# Patient Record
Sex: FEMALE | Race: OTHER | HISPANIC OR LATINO | Employment: FULL TIME | URBAN - METROPOLITAN AREA
[De-identification: names, ages, dates, MRNs, and addresses within clinical notes are randomized per-mention and may not be internally consistent; named-entity substitution may affect disease eponyms.]

---
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
## 2021-01-25 ENCOUNTER — OFFICE VISIT (OUTPATIENT)
Dept: FAMILY MEDICINE CLINIC | Facility: CLINIC | Age: 21
End: 2021-01-25

## 2021-01-25 VITALS
HEIGHT: 61 IN | BODY MASS INDEX: 27.47 KG/M2 | HEART RATE: 101 BPM | DIASTOLIC BLOOD PRESSURE: 70 MMHG | SYSTOLIC BLOOD PRESSURE: 118 MMHG | TEMPERATURE: 97 F | OXYGEN SATURATION: 97 % | RESPIRATION RATE: 18 BRPM | WEIGHT: 145.5 LBS

## 2021-01-25 DIAGNOSIS — Z72.0 TOBACCO USE: ICD-10-CM

## 2021-01-25 DIAGNOSIS — J45.20 MILD INTERMITTENT ASTHMA WITHOUT COMPLICATION: ICD-10-CM

## 2021-01-25 DIAGNOSIS — H52.10 MYOPIA, UNSPECIFIED LATERALITY: ICD-10-CM

## 2021-01-25 DIAGNOSIS — F32.A ANXIETY AND DEPRESSION: ICD-10-CM

## 2021-01-25 DIAGNOSIS — F41.9 ANXIETY AND DEPRESSION: ICD-10-CM

## 2021-01-25 DIAGNOSIS — L30.9 ECZEMA, UNSPECIFIED TYPE: ICD-10-CM

## 2021-01-25 DIAGNOSIS — E66.3 OVERWEIGHT: ICD-10-CM

## 2021-01-25 DIAGNOSIS — Z00.00 ANNUAL PHYSICAL EXAM: Primary | ICD-10-CM

## 2021-01-25 DIAGNOSIS — H52.00 HYPERMETROPIA, UNSPECIFIED LATERALITY: ICD-10-CM

## 2021-01-25 DIAGNOSIS — Z13.31 DEPRESSION SCREEN: ICD-10-CM

## 2021-01-25 PROBLEM — R06.02 SHORTNESS OF BREATH: Status: ACTIVE | Noted: 2021-01-25

## 2021-01-25 PROBLEM — R06.02 SHORTNESS OF BREATH: Status: RESOLVED | Noted: 2021-01-25 | Resolved: 2021-01-25

## 2021-01-25 PROBLEM — R63.5 WEIGHT GAIN: Status: RESOLVED | Noted: 2018-10-31 | Resolved: 2021-01-25

## 2021-01-25 PROCEDURE — 3725F SCREEN DEPRESSION PERFORMED: CPT | Performed by: PHYSICIAN ASSISTANT

## 2021-01-25 PROCEDURE — 3008F BODY MASS INDEX DOCD: CPT | Performed by: PHYSICIAN ASSISTANT

## 2021-01-25 PROCEDURE — 99395 PREV VISIT EST AGE 18-39: CPT | Performed by: PHYSICIAN ASSISTANT

## 2021-01-25 PROCEDURE — 4004F PT TOBACCO SCREEN RCVD TLK: CPT | Performed by: PHYSICIAN ASSISTANT

## 2021-01-25 RX ORDER — ALBUTEROL SULFATE 90 UG/1
1-2 AEROSOL, METERED RESPIRATORY (INHALATION) EVERY 6 HOURS PRN
Qty: 1 INHALER | Refills: 2 | Status: SHIPPED | OUTPATIENT
Start: 2021-01-25 | End: 2022-01-25 | Stop reason: SDUPTHER

## 2021-01-25 NOTE — PROGRESS NOTES
ADULT ANNUAL [address] HECTOR    NAME: Verna Esteban  AGE: 22 y o  SEX: female  : 2000     DATE: 2021     Assessment and Plan:     Problem List Items Addressed This Visit        Respiratory    Mild intermittent asthma without complication     - Stable  Continue albuterol inhaler as needed  Will refill  Relevant Medications    albuterol (PROVENTIL HFA,VENTOLIN HFA) 90 mcg/act inhaler       Musculoskeletal and Integument    Eczema     - Will trial hydrocortisone 2 5% cream, to be applied twice daily as needed to the affected areas  - Advised patient to continue applying OTC lotion such as aveeno or eucerin twice daily and also immediately after showers  - Advised to take fast showers with warm water (not hot)  Advised to pat skin dry rather than rub  Advised to use [place], [employer], or BASIS soap products or anything without scents  Relevant Medications    hydrocortisone 2 5 % cream       Other    Anxiety and depression     - Offered patient referral to psychiatry but patient declines at this time  Advised to call the office if she changes her mind  Hypermetropia    Relevant Orders    Ambulatory referral to Optometry      Other Visit Diagnoses     Annual physical exam    -  Primary    Depression screen        Myopia, unspecified laterality        Relevant Orders    Ambulatory referral to Optometry    Overweight        Tobacco use            Depression Screening Follow-up Plan: Patient's depression screening was positive with a PHQ-2 score of 2  Their PHQ-9 score was 10  Patient assessed for underlying major depression  They have no active suicidal ideations  Brief counseling provided and recommend additional follow-up/re-evaluation next office visit  Immunizations and preventive care screenings were discussed with patient today   Appropriate education was printed on patient's after visit summary  Counseling:  Alcohol/drug use: discussed moderation in alcohol intake, the recommendations for healthy alcohol use, and avoidance of illicit drug use  Dental Health: discussed importance of regular tooth brushing, flossing, and dental visits  Injury prevention: discussed safety/seat belts, safety helmets, smoke detectors, carbon dioxide detectors, and smoking near bedding or upholstery  Sexual health: discussed sexually transmitted diseases, partner selection, use of condoms, avoidance of unintended pregnancy, and contraceptive alternatives  · Exercise: the importance of regular exercise/physical activity was discussed  Recommend exercise 3-5 times per week for at least 30 minutes  BMI Counseling: Body mass index is 27 49 kg/m²  The BMI is above normal  Nutrition recommendations include decreasing portion sizes, encouraging healthy choices of fruits and vegetables, consuming healthier snacks and moderation in carbohydrate intake  Exercise recommendations include moderate physical activity 150 minutes/week  No pharmacotherapy was ordered  Tobacco Cessation Counseling: Tobacco cessation counseling was provided  The patient is sincerely urged to quit consumption of tobacco  She is not ready to quit tobacco  Medication options and side effects of medication discussed  Patient refused medication  Return in about 1 year (around 1/25/2022), or if symptoms worsen or fail to improve, for Annual physical      Chief Complaint:     Chief Complaint   Patient presents with    Well Check     well ck 23 y/o, as per pt she has dry skin and would like to be referred to a eye speacilistl    Medication Refill     as per pt needs inhaler refilled      History of Present Illness:     Adult Annual Physical   Patient here for a comprehensive physical exam  Patient notes she has always suffered from dry skin which is worse in the winter and in the summer    Patient notes it is located throughout her body, but is worse on her arms and legs  Patient notes it is often itchy  Patient denies any pain or burning sensation  Patient notes she has tried some type of cream, but did not help  Patient does not know the name of the cream   Patient notes she has been using baby lotion for moisturizer daily  Diet and Physical Activity  · Diet/Nutrition: Doesn't eat much because she notes she doesnt have much of an appetite  · Exercise: no formal exercise  Depression Screening  PHQ-9 Depression Screening    PHQ-9:   Frequency of the following problems over the past two weeks:      Little interest or pleasure in doing things: 0 - not at all  Feeling down, depressed, or hopeless: 2 - more than half the days  Trouble falling or staying asleep, or sleeping too much: 3 - nearly every day  Feeling tired or having little energy: 0 - not at all  Poor appetite or overeating: 3 - nearly every day  Feeling bad about yourself - or that you are a failure or have let yourself or your family down: 2 - more than half the days  Trouble concentrating on things, such as reading the newspaper or watching television: 0 - not at all  Moving or speaking so slowly that other people could have noticed  Or the opposite - being so fidgety or restless that you have been moving around a lot more than usual: 0 - not at all  Thoughts that you would be better off dead, or of hurting yourself in some way: 0 - not at all  PHQ-2 Score: 2  PHQ-9 Score: 10       General Health  · Sleep: sleeps poorly  · Hearing: normal - bilateral   · Vision: vision problems: has issues with both near and far vision  Patient was previously wearing glasses that she received in [place] but they broke when she came to the [facility]    Patient would like to establish care with an eye doctor  · Dental: no dental visits for >1 year  /GYN Health  · Last menstrual period: 1/18/2021     Review of Systems:     Review of Systems   Constitutional: Positive for fatigue  Negative for chills and fever  HENT: Negative for congestion, ear pain, rhinorrhea and sore throat  Eyes: Positive for visual disturbance  Negative for pain  Respiratory: Negative for cough, chest tightness and shortness of breath  Cardiovascular: Negative for chest pain and palpitations  Gastrointestinal: Negative for abdominal pain, constipation, diarrhea, nausea and vomiting  Genitourinary: Negative for difficulty urinating and dysuria  Musculoskeletal: Negative for arthralgias  Skin: Positive for rash  Neurological: Negative for dizziness and headaches  Psychiatric/Behavioral: Positive for sleep disturbance  Negative for suicidal ideas  The patient is not nervous/anxious         Past Medical History:     Past Medical History:   Diagnosis Date    Asthma     Endometriosis       Past Surgical History:     Past Surgical History:   Procedure Laterality Date    NO PAST SURGERIES        Social History:     Social History     Socioeconomic History    Marital status: Single     Spouse name: None    Number of children: 0    Years of education: None    Highest education level: None   Occupational History    None   Social Needs    Financial resource strain: None    Food insecurity     Worry: None     Inability: None    Transportation needs     Medical: None     Non-medical: None   Tobacco Use    Smoking status: Current Some Day Smoker    Smokeless tobacco: Never Used    Tobacco comment: hookah soically    Substance and Sexual Activity    Alcohol use: No    Drug use: No    Sexual activity: Never   Lifestyle    Physical activity     Days per week: None     Minutes per session: None    Stress: None   Relationships    Social connections     Talks on phone: None     Gets together: None     Attends Restoration service: None     Active member of club or organization: None     Attends meetings of clubs or organizations: None     Relationship status: None    Intimate partner violence     Fear of current or ex partner: None     Emotionally abused: None     Physically abused: None     Forced sexual activity: None   Other Topics Concern    None   Social History Narrative    None      Family History:     Family History   Problem Relation Age of Onset    Anxiety disorder Mother     Depression Mother     Asthma Mother     Asthma Brother       Current Medications:     Current Outpatient Medications   Medication Sig Dispense Refill    albuterol (PROVENTIL HFA,VENTOLIN HFA) 90 mcg/act inhaler Inhale 1-2 puffs every 6 (six) hours as needed for wheezing or shortness of breath 1 Inhaler 2    hydrocortisone 2 5 % cream Apply topically 2 (two) times a day as needed for rash 30 g 1     No current facility-administered medications for this visit  Allergies:     No Known Allergies   Physical Exam:     /70 (BP Location: Right arm, Patient Position: Sitting, Cuff Size: Standard)   Pulse 101   Temp (!) 97 °F (36 1 °C) (Temporal)   Resp 18   Ht 5' 1" (1 549 m)   Wt 66 kg (145 lb 8 oz)   LMP 01/18/2021   SpO2 97%   BMI 27 49 kg/m²     Physical Exam  Vitals signs and nursing note reviewed  Constitutional:       Appearance: She is well-developed  HENT:      Head: Normocephalic and atraumatic  Right Ear: Tympanic membrane and external ear normal       Left Ear: Tympanic membrane and external ear normal       Nose: Nose normal       Mouth/Throat:      Pharynx: Uvula midline  Eyes:      Extraocular Movements: Extraocular movements intact  Conjunctiva/sclera: Conjunctivae normal       Pupils: Pupils are equal, round, and reactive to light  Neck:      Musculoskeletal: Normal range of motion and neck supple  Cardiovascular:      Rate and Rhythm: Normal rate and regular rhythm  Heart sounds: Normal heart sounds  No murmur  Pulmonary:      Effort: Pulmonary effort is normal       Breath sounds: Normal breath sounds  No wheezing     Abdominal:      General: Bowel sounds are normal  Palpations: Abdomen is soft  Tenderness: There is no abdominal tenderness  Musculoskeletal: Normal range of motion  General: No swelling  Skin:     General: Skin is warm and dry  Neurological:      Mental Status: She is alert and oriented to person, place, and time  Psychiatric:         Mood and Affect: Mood normal          Speech: Speech normal          Behavior: Behavior normal           - Utilized [employer] phones for translation        MARSHAL Bullard Sa

## 2021-01-25 NOTE — ASSESSMENT & PLAN NOTE
- Will trial hydrocortisone 2 5% cream, to be applied twice daily as needed to the affected areas  - Advised patient to continue applying OTC lotion such as aveeno or eucerin twice daily and also immediately after showers  - Advised to take fast showers with warm water (not hot)  Advised to pat skin dry rather than rub  Advised to use [facility], [facility], or BASIS soap products or anything without scents

## 2021-01-25 NOTE — PATIENT INSTRUCTIONS
Eczema   LO QUE NECESITA SABER:   El eczema o dermatitis atópica, es un sarpullido locke en la piel que causa comezón  Esta es mckayla condición de larga duración que podría provocar brotes por el ronit de strauss pavan  INSTRUCCIONES SOBRE EL DAWIT HOSPITALARIA:   Regrese a la michael de emergencias si:  · Usted tiene fiebre o nota cely rojizas subiendo por strauss Maki Rockers o pierna  · El sarpullido se ve más inflamado, locke o caliente  Comuníquese con strauss médico si:  · La mayor parte de strauss piel está [place], inflamada, adolorida y [place] con escamas  · Usted desarrolla costras husain, sangrientas y dolorosas  · Strauss piel se ampolla y expulsa pus becker o amarillo  · Tiene alguna pregunta acerca de strauss condición o cuidado  Medicamentos:  · Los medicamentos , marium los inmunosupresores, ayudan a aliviar la comezón, el enrojecimiento, el dolor y la inflamación  Se pueden administrar en forma de cremas o pastillas  Es probable que usted también reciba antihistamínicos para reducir la comezón o antibióticos si tiene mckayla infección en la piel  · [place] lisa medicamentos marium se le haya indicado  Consulte con strauss médico si usted aleja que strauss medicamento no le está ayudando o si presenta efectos secundarios  Infórmele si es alérgico a algún medicamento  Mantenga mckayla lista actualizada de los [place], las vitaminas y los productos herbales que meir  Incluya los siguientes datos de los medicamentos: cantidad, frecuencia y motivo de administración  Traiga con usted la lista o los envases de las píldoras a lisa citas de seguimiento  Lleve la lista de los medicamentos con usted en anurag de mckayla emergencia  Controle el eczema:  · No se rasque  Dése palmaditas o presione la piel para aliviar la comezón  Los síntomas empeorarán si usted se rasca  Mantenga lisa uñas cortas para que no se desgarre la piel si se rasca  · Mantenga strauss piel húmeda   Aplique mckayla loción, crema o pomada en strauss piel después de un baño o mckayla ducha cuando la piel todavía está húmeda  Pregunte a romano médico que usar y con qué frecuencia  · [place] mckayla ducha o báñese con agua tibia jeffrey 10 minutos o menos  Use jabón suave  Pregúntele a romano médico cuál jabón es adecuado para usted  · Use ropa de algodón  Use ropa holgada de algodón o mezclas de algodón  Evite la ropa de samy  · Use un humidificador para añadir humedad en el aire de romano hogar  · Evite cambios de temperatura , especialmente actividades que le producen sudoración excesiva porque esto le puede ocasionar comezón  Retire las [employer] de romano cama si usted se acalora mientras duerme  · Evite los alérgenos, polvos e irritantes de la piel  Las mascotas no deben ser permitidas dentro de romano casa  No use perfume, suavizante de ropa o maquillaje que le produzca mckayla sensación de ardor o comezón  Acuda a lisa consultas de control con romano médico según le indicaron  Anote lisa preguntas para que se acuerde de hacerlas jeffrey lisa visitas  © Copyright [employer] Information is for End User's use only and may not be sold, redistributed or otherwise used for commercial purposes  All illustrations and images included in CareNotes® are the copyrighted property of A D A M , [employer]  or [address] [place] [address] es sólo para uso en educación  Romano intención no es darle un consejo médico sobre enfermedades o tratamientos  Colsulte con romano Marco Antonio Hu farmacéutico antes de seguir cualquier régimen médico para saber si es seguro y efectivo para usted

## 2021-01-25 NOTE — ASSESSMENT & PLAN NOTE
- Offered patient referral to psychiatry but patient declines at this time  Advised to call the office if she changes her mind

## 2021-12-05 ENCOUNTER — HOSPITAL ENCOUNTER (EMERGENCY)
Facility: HOSPITAL | Age: 21
Discharge: HOME | End: 2021-12-05
Attending: EMERGENCY MEDICINE | Admitting: EMERGENCY MEDICINE
Payer: COMMERCIAL

## 2021-12-05 VITALS
DIASTOLIC BLOOD PRESSURE: 68 MMHG | WEIGHT: 154.32 LBS | SYSTOLIC BLOOD PRESSURE: 150 MMHG | BODY MASS INDEX: 29.16 KG/M2 | TEMPERATURE: 100 F | HEART RATE: 108 BPM | OXYGEN SATURATION: 98 % | RESPIRATION RATE: 16 BRPM

## 2021-12-05 DIAGNOSIS — J11.1 INFLUENZA: Primary | ICD-10-CM

## 2021-12-05 LAB
ALBUMIN SERPL BCP-MCNC: 4.2 G/DL (ref 3–5.2)
ALP SERPL-CCNC: 76 U/L (ref 43–122)
ALT SERPL W P-5'-P-CCNC: 17 U/L
ANION GAP SERPL CALCULATED.3IONS-SCNC: 5 MMOL/L (ref 5–14)
AST SERPL W P-5'-P-CCNC: 28 U/L (ref 14–36)
BILIRUB SERPL-MCNC: 0.72 MG/DL
BUN SERPL-MCNC: 7 MG/DL (ref 5–25)
CALCIUM SERPL-MCNC: 8.9 MG/DL (ref 8.4–10.2)
CHLORIDE SERPL-SCNC: 101 MMOL/L (ref 97–108)
CO2 SERPL-SCNC: 30 MMOL/L (ref 22–30)
CREAT SERPL-MCNC: 0.7 MG/DL (ref 0.6–1.2)
ERYTHROCYTE [DISTWIDTH] IN BLOOD BY AUTOMATED COUNT: 13.1 %
FLUAV RNA RESP QL NAA+PROBE: POSITIVE
FLUBV RNA RESP QL NAA+PROBE: NEGATIVE
GFR SERPL CREATININE-BSD FRML MDRD: 125 ML/MIN/1.73SQ M
GLUCOSE SERPL-MCNC: 88 MG/DL (ref 70–99)
HCT VFR BLD AUTO: 42.2 % (ref 36–46)
HGB BLD-MCNC: 14.5 G/DL (ref 12–16)
LIPASE SERPL-CCNC: 74 U/L (ref 23–300)
LYMPHOCYTES # BLD AUTO: 0.57 THOUSAND/UL (ref 0.5–4)
LYMPHOCYTES # BLD AUTO: 11 % (ref 25–45)
MCH RBC QN AUTO: 31.7 PG (ref 26–34)
MCHC RBC AUTO-ENTMCNC: 34.2 G/DL (ref 31–36)
MCV RBC AUTO: 93 FL (ref 80–100)
MONOCYTES # BLD AUTO: 0.47 THOUSAND/UL (ref 0.2–0.9)
MONOCYTES NFR BLD AUTO: 9 % (ref 1–10)
NEUTS BAND NFR BLD MANUAL: 5 % (ref 0–8)
NEUTS SEG # BLD: 4.16 THOUSAND/UL (ref 1.8–7.8)
NEUTS SEG NFR BLD AUTO: 75 %
PLATELET # BLD AUTO: 169 THOUSANDS/UL (ref 150–450)
PLATELET BLD QL SMEAR: ADEQUATE
PMV BLD AUTO: 9.5 FL (ref 8.9–12.7)
POTASSIUM SERPL-SCNC: 4 MMOL/L (ref 3.6–5)
PROT SERPL-MCNC: 8 G/DL (ref 5.9–8.4)
RBC # BLD AUTO: 4.56 MILLION/UL (ref 4–5.2)
RBC MORPH BLD: NORMAL
RSV RNA RESP QL NAA+PROBE: NEGATIVE
S PYO DNA THROAT QL NAA+PROBE: NORMAL
SARS-COV-2 RNA RESP QL NAA+PROBE: NEGATIVE
SODIUM SERPL-SCNC: 136 MMOL/L (ref 137–147)
TOTAL CELLS COUNTED SPEC: 100
WBC # BLD AUTO: 5.2 THOUSAND/UL (ref 4.5–11)

## 2021-12-05 PROCEDURE — 85027 COMPLETE CBC AUTOMATED: CPT

## 2021-12-05 PROCEDURE — 80053 COMPREHEN METABOLIC PANEL: CPT

## 2021-12-05 PROCEDURE — 36415 COLL VENOUS BLD VENIPUNCTURE: CPT

## 2021-12-05 PROCEDURE — 99284 EMERGENCY DEPT VISIT MOD MDM: CPT

## 2021-12-05 PROCEDURE — 96361 HYDRATE IV INFUSION ADD-ON: CPT

## 2021-12-05 PROCEDURE — 83690 ASSAY OF LIPASE: CPT

## 2021-12-05 PROCEDURE — 85007 BL SMEAR W/DIFF WBC COUNT: CPT

## 2021-12-05 PROCEDURE — 87651 STREP A DNA AMP PROBE: CPT

## 2021-12-05 PROCEDURE — 0241U HB NFCT DS VIR RESP RNA 4 TRGT: CPT

## 2021-12-05 PROCEDURE — 96374 THER/PROPH/DIAG INJ IV PUSH: CPT

## 2021-12-05 RX ORDER — ONDANSETRON 2 MG/ML
4 INJECTION INTRAMUSCULAR; INTRAVENOUS ONCE
Status: COMPLETED | OUTPATIENT
Start: 2021-12-05 | End: 2021-12-05

## 2021-12-05 RX ORDER — IBUPROFEN 400 MG/1
400 TABLET ORAL ONCE
Status: COMPLETED | OUTPATIENT
Start: 2021-12-05 | End: 2021-12-05

## 2021-12-05 RX ADMIN — ONDANSETRON 4 MG: 2 INJECTION INTRAMUSCULAR; INTRAVENOUS at 13:11

## 2021-12-05 RX ADMIN — IBUPROFEN 400 MG: 400 TABLET ORAL at 13:11

## 2021-12-05 RX ADMIN — SODIUM CHLORIDE 1000 ML: 0.9 INJECTION, SOLUTION INTRAVENOUS at 13:11

## 2022-01-06 ENCOUNTER — OFFICE VISIT (OUTPATIENT)
Dept: FAMILY MEDICINE CLINIC | Facility: CLINIC | Age: 22
End: 2022-01-06

## 2022-01-06 VITALS
RESPIRATION RATE: 16 BRPM | HEART RATE: 93 BPM | TEMPERATURE: 98 F | DIASTOLIC BLOOD PRESSURE: 80 MMHG | HEIGHT: 61 IN | OXYGEN SATURATION: 99 % | WEIGHT: 163.9 LBS | SYSTOLIC BLOOD PRESSURE: 110 MMHG | BODY MASS INDEX: 30.94 KG/M2

## 2022-01-06 DIAGNOSIS — H52.10 MYOPIA, UNSPECIFIED LATERALITY: ICD-10-CM

## 2022-01-06 DIAGNOSIS — H52.00 HYPERMETROPIA, UNSPECIFIED LATERALITY: ICD-10-CM

## 2022-01-06 DIAGNOSIS — R22.0 SWELLING, MASS, OR LUMP ON FACE: Primary | ICD-10-CM

## 2022-01-06 DIAGNOSIS — E66.09 CLASS 1 OBESITY DUE TO EXCESS CALORIES WITHOUT SERIOUS COMORBIDITY WITH BODY MASS INDEX (BMI) OF 30.0 TO 30.9 IN ADULT: ICD-10-CM

## 2022-01-06 PROCEDURE — 99213 OFFICE O/P EST LOW 20 MIN: CPT | Performed by: PHYSICIAN ASSISTANT

## 2022-01-06 NOTE — PROGRESS NOTES
Assessment/Plan:    Swelling, mass, or lump on face  - Most likely epidermoid cyst   Since lump is tender to palpation and bothersome for patient, she would like for it to be removed  Will refer to Plastic surgery for further evaluation and management  Diagnoses and all orders for this visit:    Swelling, mass, or lump on face  -     Ambulatory referral to Plastic Surgery; Future    Myopia, unspecified laterality  -     Ambulatory referral to Ophthalmology; Future    Hypermetropia, unspecified laterality  -     Ambulatory referral to Ophthalmology; Future    Class 1 obesity due to excess calories without serious comorbidity with body mass index (BMI) of 30 0 to 30 9 in adult          All of patients questions were answered  Patient understands and agrees with the above plan  Return if symptoms worsen or fail to improve  Nika Abraham PA-C  01/06/22  [facility] CHARLES Sia          Subjective:     Patient ID: Osmar Chahal  is a 25 y o  female with known PHM of anxiety who presents today in office for lump on head  - Patient is a 25 y o  female who presents today for lump on head  Patient notes she first noticed a lump located on the right mid forehead about 3 months ago  Patient notes it has been growing larger and is now bothersome  Patient denies any injury or trauma to the area  Patient denies any discharge  Also, patient notes she would like to establish with an eye doctor because she has trouble with her near and far vision  The following portions of the patient's history were reviewed and updated as appropriate: allergies, current medications, past family history, past medical history, past social history, past surgical history and problem list         Review of Systems   Constitutional: Negative for appetite change, chills and fever  HENT: Negative for congestion and sore throat  Eyes: Negative for pain  Respiratory: Negative for cough, chest tightness and shortness of breath  Cardiovascular: Negative for chest pain and palpitations  Gastrointestinal: Negative for abdominal pain, constipation, diarrhea, nausea and vomiting  Genitourinary: Negative for difficulty urinating and dysuria  Musculoskeletal: Negative for myalgias  Skin: Negative for rash  Lump of forehead   Neurological: Negative for dizziness and headaches  BMI Counseling: Body mass index is 30 97 kg/m²  The BMI is above normal  Nutrition recommendations include encouraging healthy choices of fruits and vegetables  Exercise recommendations include moderate physical activity 150 minutes/week  No pharmacotherapy was ordered  Rationale for BMI follow-up plan is due to patient being overweight or obese  Objective:   Vitals:    01/06/22 0903   BP: 110/80   BP Location: Right arm   Patient Position: Sitting   Cuff Size: Standard   Pulse: 93   Resp: 16   Temp: 98 °F (36 7 °C)   TempSrc: Temporal   SpO2: 99%   Weight: 74 3 kg (163 lb 14 4 oz)   Height: 5' 1" (1 549 m)         Physical Exam  Vitals and nursing note reviewed  Constitutional:       General: She is not in acute distress  Appearance: She is well-developed  HENT:      Head: Normocephalic and atraumatic  Right Ear: External ear normal       Left Ear: External ear normal       Nose: Nose normal    Eyes:      Conjunctiva/sclera: Conjunctivae normal    Cardiovascular:      Rate and Rhythm: Normal rate and regular rhythm  Pulses: Normal pulses  Heart sounds: Normal heart sounds  Pulmonary:      Effort: Pulmonary effort is normal  No respiratory distress  Breath sounds: Normal breath sounds  No wheezing  Musculoskeletal:      Cervical back: Normal range of motion and neck supple  Skin:     General: Skin is warm and dry  Neurological:      Mental Status: She is alert and oriented to person, place, and time     Psychiatric:         Behavior: Behavior normal            - Utilized [facility] services for translation

## 2022-01-06 NOTE — ASSESSMENT & PLAN NOTE
- Most likely epidermoid cyst   Since lump is tender to palpation and bothersome for patient, she would like for it to be removed  Will refer to Plastic surgery for further evaluation and management

## 2022-01-25 ENCOUNTER — OFFICE VISIT (OUTPATIENT)
Dept: FAMILY MEDICINE CLINIC | Facility: CLINIC | Age: 22
End: 2022-01-25

## 2022-01-25 VITALS
HEART RATE: 112 BPM | BODY MASS INDEX: 30.58 KG/M2 | RESPIRATION RATE: 18 BRPM | DIASTOLIC BLOOD PRESSURE: 74 MMHG | HEIGHT: 61 IN | TEMPERATURE: 98.2 F | OXYGEN SATURATION: 98 % | SYSTOLIC BLOOD PRESSURE: 110 MMHG | WEIGHT: 162 LBS

## 2022-01-25 DIAGNOSIS — J45.20 MILD INTERMITTENT ASTHMA WITHOUT COMPLICATION: ICD-10-CM

## 2022-01-25 DIAGNOSIS — Z00.00 ANNUAL PHYSICAL EXAM: Primary | ICD-10-CM

## 2022-01-25 DIAGNOSIS — L30.9 ECZEMA, UNSPECIFIED TYPE: ICD-10-CM

## 2022-01-25 PROCEDURE — 99395 PREV VISIT EST AGE 18-39: CPT | Performed by: PHYSICIAN ASSISTANT

## 2022-01-25 RX ORDER — ALBUTEROL SULFATE 2.5 MG/3ML
2.5 SOLUTION RESPIRATORY (INHALATION) EVERY 6 HOURS PRN
Qty: 60 ML | Refills: 1 | Status: SHIPPED | OUTPATIENT
Start: 2022-01-25

## 2022-01-25 RX ORDER — ALBUTEROL SULFATE 90 UG/1
1-2 AEROSOL, METERED RESPIRATORY (INHALATION) EVERY 6 HOURS PRN
Qty: 8 G | Refills: 2 | Status: SHIPPED | OUTPATIENT
Start: 2022-01-25

## 2022-01-25 RX ORDER — TRIAMCINOLONE ACETONIDE 1 MG/G
CREAM TOPICAL 2 TIMES DAILY
Qty: 80 G | Refills: 1 | Status: SHIPPED | OUTPATIENT
Start: 2022-01-25

## 2022-01-25 NOTE — PROGRESS NOTES
[address] [facility] PRACTICE HECTOR    NAME: Lonnie Esteban  AGE: 25 y o  SEX: female  : 2000     DATE: 2022     Assessment and Plan:     Problem List Items Addressed This Visit        Respiratory    Mild intermittent asthma without complication     - Stable  Continue albuterol inhaler as needed  Will refill    - Will refill nebulizer solution  Relevant Medications    albuterol (2 5 mg/3 mL) 0 083 % nebulizer solution    albuterol (PROVENTIL HFA,VENTOLIN HFA) 90 mcg/act inhaler       Musculoskeletal and Integument    Eczema     - Hydrocortisone 2 5% cream was not effective  Will prescribe triamcinolone 0 1% cream, apply twice daily to affected area  - Advised patient to continue applying OTC lotion such as aveeno or eucerin twice daily and also immediately after showers  - Advised to take fast showers with warm water (not hot)  Advised to pat skin dry rather than rub  Advised to use [place], [facility], or [facility] soap products or anything without scents  Relevant Medications    triamcinolone (KENALOG) 0 1 % cream      Other Visit Diagnoses     Annual physical exam    -  Primary          Immunizations and preventive care screenings were discussed with patient today  Appropriate education was printed on patient's after visit summary  Counseling:  Alcohol/drug use: discussed moderation in alcohol intake, the recommendations for healthy alcohol use, and avoidance of illicit drug use  Dental Health: discussed importance of regular tooth brushing, flossing, and dental visits  Injury prevention: discussed safety/seat belts, safety helmets, smoke detectors, carbon dioxide detectors, and smoking near bedding or upholstery  Sexual health: discussed sexually transmitted diseases, partner selection, use of condoms, avoidance of unintended pregnancy, and contraceptive alternatives    · Exercise: the importance of regular exercise/physical activity was discussed  Recommend exercise 3-5 times per week for at least 30 minutes  Return in about 1 year (around 1/25/2023) for Annual physical      Chief Complaint:     Chief Complaint   Patient presents with    Physical Exam      History of Present Illness:     Adult Annual Physical   Patient here for a comprehensive physical exam   Patient notes she has eczema on her arms and legs  Patient notes it is usually worse during the cold weather months  Patient did try hydrocortisone cream but has not help very much  Diet and Physical Activity  · Diet/Nutrition: average, picky eater  · Exercise: no formal exercise  General Health  · Sleep: sleeps poorly  · Hearing: normal - bilateral   · Vision: goes for regular eye exams and wears glasses  · Dental: regular dental visits  /GYN Health  · Last menstrual period: 1/1/2022; menses are irregular  · Last PAP: Per patient, earlier this month through Dr Erica Jones  Review of Systems:     Review of Systems   Constitutional: Negative for appetite change, chills and fever  HENT: Negative for congestion and sore throat  Eyes: Negative for pain  Respiratory: Negative for cough, chest tightness and shortness of breath  Cardiovascular: Negative for chest pain and palpitations  Gastrointestinal: Negative for abdominal pain, constipation, diarrhea, nausea and vomiting  Genitourinary: Negative for difficulty urinating and dysuria  Musculoskeletal: Negative for myalgias  Skin: Positive for rash  Neurological: Negative for dizziness and headaches        Past Medical History:     Past Medical History:   Diagnosis Date    Asthma     Endometriosis       Past Surgical History:     Past Surgical History:   Procedure Laterality Date    NO PAST SURGERIES        Social History:     Social History     Socioeconomic History    Marital status: Single     Spouse name: None    Number of children: 0    Years of education: None    Highest education level: None   Occupational History    None   Tobacco Use    Smoking status: Current Some Day Smoker    Smokeless tobacco: Never Used    Tobacco comment: hookah soically    Substance and Sexual Activity    Alcohol use: No    Drug use: No    Sexual activity: Never   Other Topics Concern    None   Social History Narrative    None     Social Determinants of Health     Financial Resource Strain: Low Risk     Difficulty of Paying Living Expenses: Not very hard   Food Insecurity: No Food Insecurity    Worried About Running Out of Food in the Last Year: Never true    Mariano of Food in the Last Year: Never true   Transportation Needs: No Transportation Needs    Lack of Transportation (Medical): No    Lack of Transportation (Non-Medical): No   Physical Activity: Not on file   Stress: Not on file   Social Connections: Not on file   Intimate Partner Violence: Not on file   Housing Stability: Not on file      Family History:     Family History   Problem Relation Age of Onset    Anxiety disorder Mother     Depression Mother     Asthma Mother     Asthma Brother       Current Medications:     Current Outpatient Medications   Medication Sig Dispense Refill    albuterol (2 5 mg/3 mL) 0 083 % nebulizer solution Take 3 mL (2 5 mg total) by nebulization every 6 (six) hours as needed for wheezing or shortness of breath 60 mL 1    albuterol (PROVENTIL HFA,VENTOLIN HFA) 90 mcg/act inhaler Inhale 1-2 puffs every 6 (six) hours as needed for wheezing or shortness of breath 8 g 2    triamcinolone (KENALOG) 0 1 % cream Apply topically 2 (two) times a day 80 g 1     No current facility-administered medications for this visit        Allergies:     No Known Allergies   Physical Exam:     /74 (BP Location: Left arm, Patient Position: Sitting, Cuff Size: Standard)   Pulse (!) 112   Temp 98 2 °F (36 8 °C) (Temporal)   Resp 18   Ht 5' 1" (1 549 m)   Wt 73 5 kg (162 lb)   LMP 01/01/2022   SpO2 98%   BMI 30 61 kg/m²     Physical Exam  Vitals and nursing note reviewed  Constitutional:       General: She is not in acute distress  Appearance: She is well-developed  HENT:      Head: Normocephalic and atraumatic  Right Ear: Tympanic membrane and external ear normal       Left Ear: Tympanic membrane and external ear normal       Nose: Nose normal       Mouth/Throat:      Pharynx: Uvula midline  Eyes:      Extraocular Movements: Extraocular movements intact  Conjunctiva/sclera: Conjunctivae normal       Pupils: Pupils are equal, round, and reactive to light  Cardiovascular:      Rate and Rhythm: Normal rate and regular rhythm  Pulses: Normal pulses  Heart sounds: Normal heart sounds  Pulmonary:      Effort: Pulmonary effort is normal  No respiratory distress  Breath sounds: Normal breath sounds  No wheezing  Abdominal:      General: Bowel sounds are normal       Palpations: Abdomen is soft  Tenderness: There is no abdominal tenderness  Musculoskeletal:         General: No swelling  Normal range of motion  Cervical back: Normal range of motion and neck supple  Skin:     General: Skin is warm and dry  Neurological:      Mental Status: She is alert and oriented to person, place, and time  Psychiatric:         Mood and Affect: Mood normal          Speech: Speech normal          Behavior: Behavior normal           - Utilized [employer] for translation      MARSHAL Wallace 71

## 2022-01-25 NOTE — PATIENT INSTRUCTIONS
[facility] Reconstructive Surgery · Phone  [address] [facility], [facility], [address] [facility]  [phone]    Visita de bienestar para los adultos   CUIDADO [place]:   [facility] visita de bienestar es cuando usted acude con un médico para que le kelsie exámenes de detección de problemas de [place]  Romano médico también le dará consejos sobre cómo mantenerse saludable  Anote lisa preguntas para que se acuerde de hacerlas  Pregunte a romano médico con qué frecuencia debería realizarse mckayla visita de bienestar  Lo que sucede en mckayla visita de bienestar: Romano médico le preguntará sobre romano juliano y romano historia familiar [address] [facility]  [place] incluye presión arterial octavio, enfermedades del corazón y cáncer  El médico le preguntará si tiene síntomas que le preocupen, si [facility] y [place] de ánimo  También se le preguntará acerca del uso de medicamentos, suplementos, alimentos y alcohol  Es posible que le kelsie cualquiera de lo siguiente:  · Romano peso será revisado  Es posible que [facility] midan romano altura para calcular romano índice de masa corporal LTAC, located within [facility])  El [facility] indica si tiene un peso saludable  · Se verificarán romano presión arterial y frecuencia cardíaca  También pueden revisar romano temperatura  · Exámenes de [facility] y [facility] podría realizarse  Se podrían realizar exámenes de lukasz para revisar los niveles de [facility]  Los niveles anormales de colesterol aumentan el riesgo de enfermedad del corazón y accidente cerebrovascular  Puede que también necesite mckayla prueba de lukasz u orina para revisar si tiene diabetes si usted está en mayor riesgo  Las pruebas de orina pueden hacerse para buscar signos de mckayla infección o mckayla enfermedad renal     · Un examen físico incluye la comprobación de lisa latidos del corazón y los pulmones con un estetoscopio  Romano médico también podría revisarle la piel para buscar daños causados por el sol  · Pruebas de detección podría recomendarse   Se realiza un examen de detección para detectar enfermedades que pueden no causar síntomas  Los exámenes de detección que necesite dependen de romano edad, género, antecedentes familiares y hábitos de pavan  Por ejemplo, podrían recomendarle la exploración selectiva colorrectal si tiene 48 años o más  Si es [place], necesita los siguientes exámenes de detección:  · El examen de Papanicolaou se utiliza para detectar cáncer de chris uterino  El examen del Papanicolaou por lo general se realiza entre 3 a 5 años dependiendo de romano edad  Puede necesitarlo más a menudo si usted ha tenido [employer] de la prueba de Papanicolaou en el pasado  Pregunte a romano médico con qué frecuencia debería realizarse un examen de Papanicolaou  · Jodi Manish es mckayla radiografía de lisa mamas para detectar cáncer de mama  Los expertos recomiendan [address] cada 2 años empezando a los 48 años de Anton  Es probable que usted necesite Jodi [place] a los 52 años o antes si tiene riesgo alto de cáncer de seno  Hable con romano médico sobre cuándo debe empezar con lisa mamografías y con cuánta frecuencia las necesita  Vacunas que podría necesitar:  · Debe recibir mckayla vacuna contra la influenza todos los Los arnoldo  La vacuna contra la influenza protege de la gripe  Varios tipos de virus causan la influenza  Debido a que los virus [place] con el [place], es necesaria la producción de nuevas vacunas cada año  · Debe recibir Myesha [place] vacuna de refuerzo contra el tétanos-difteria (Td) cada 10 años  Esta vacuna protege contra el tétanos y la difteria  El tétanos es mckayla infección severa que puede causar trismo y espasmos musculares dolorosos  La difteria es mckayla infección bacteriana grave que produce mckayla cubierta gruesa en la parte de atrás de la boca y garganta  · Debe recibir la vacuna contra el virus del papiloma humano (VPH) si usted es keyonna y [place] 19 y 32 años o es hombre y [place] 23 y 24 años y nunca la recibió  Esta vacuna protege contra la infección por VPH   El virus del papiloma Pepin o VPH es la infección más común que se transmite por contacto sexual  El virus del papiloma humano también podría provocar cáncer vaginal, del pene y del ano  · Debe recibir la vacuna antineumocócica si tiene más de 72 años  La vacuna antineumocócica es mckayla inyección que se administra para protegerlo contra mckayla enfermedad neumocócica  La enfermedad neumocócica es mckayla infección causada por la bacteria neumocócica  La infección puede causar neumonía, meningitis o mckayla infección del oído  · Debe recibir la vacuna contra la culebrilla Si tiene 61 años o más, incluso si ha tenido culebrilla antes  La vacuna contra la culebrilla (herpes zóster) es mckayla inyección usada para proteger contra el virus zóster que causa la varicela  [place] es el mismo virus que causa la varicela  La culebrilla es un sarpullido doloroso que se desarrolla en personas que tuvieron varicela o schulz estado expuestas al virus  Cómo comer saludable: Mi [place] es un modelo para planear comidas sanas  Muestra los tipos y cantidades de alimentos que deberían ir en un plato  Monica Tristin y verduras representan alrededor de la mitad de romano plato y los granos y proteínas representan la otra mitad  Se incluye mckayla porción de productos lácteos al lado del plato  La cantidad de calorías y [address] de las porciones que usted necesita dependen de romano edad, [place], peso y altura  Los ejemplos de alimentos saludables son:  · Consuma mckayla variedad de verduras marium las de color staci oscuro, locke y [place]  Usted también puede incluir verduras enlatadas bajas en sodio (sal) y verduras congeladas sin mantequilla ni salsas JPCSASYG  · Consuma mckayla variedad de fruta frescas , las frutas enlatadas en 100% de jugo, fruta [place] y dina secos  · Incluya granos enteros   Por lo menos la mitad de los granos que usted consume deben ser granos de viviane integral  Por ejemplo, panes de grano entero, pasta integral, arroz integral y cereales de grano entero marium la derrick     · Consuma mckayla variedad de alimentos con proteínas marium mariscos (pescado y crustáceos), carne magra y carne de ave sin piel (pavo y gisela)  Ejemplos de brenda magras incluyen pierna, paleta o lomo de puerco y josefa, solomillo o, lomo de res y carne [place] de res extra [place]  Otros alimentos ricos en proteínas son los huevos y sustitutos de [place], frijoles, chícharos, productos de soya, nueces y semillas  · Elija productos lácteos bajos en grasa [employer] o del 1% o yogur, queso y requesón bajos en grasa  · Limite las grasas poco saludables, marium la [place], la margarina dura y la [place]  Ejercicio: Realice mckayla actividad física por lo menos 30 minutos al día, la mayoría de los días de la [place]  Algunos de los ejercicios incluyen caminar, montar en bicicleta, bailar y nadar  Usted también puede realizar más actividad física usando las escaleras en vez de los ascensores o estacionarse más lejos cuando [employer] a las tiendas  Incluya ejercicios para fortalecer los músculos 2 días a la semana  El ejercicio regular proporciona muchos beneficios para la juliano  Elinda Dexter a controlar romano peso y [employer] riesgo de diabetes tipo 2, presión arterial octavio, enfermedad del corazón y accidente cerebrovascular  El ejercicio [employer] puede ayudar a mejorar romano estado de ánimo  Pregunte a romano médico acerca del mejor plan de ejercicio para usted  Pautas generales de juliano y seguridad:  · No fume  La nicotina y otras sustancias químicas que contienen los cigarrillos y cigarros pueden dañar los pulmones  Pida información a romano médico si usted actualmente fuma y necesita ayuda para dejar de fumar  Los cigarrillos electrónicos o el tabaco sin humo igualmente contienen nicotina  Consulte con romano médico antes de [employer]  · Limite el consumo de alcohol  Un trago equivale a 12 onzas de cerveza, 5 onzas de vino o 1 onza y ½ de licor  · Baje de peso, si es necesario   El sobrepeso aumenta el riesgo de ciertas condiciones de [place]  Estos incluyen enfermedad del corazón, presión arterial octavio, diabetes tipo 2 y ciertos tipos de cáncer  · Proteja romano piel  No tome el sol ni use amarilis de bronceado  Use un protector solar con un FPS mayor a 13  Aplíquese el bloqueador por lo menos 15 minutos antes de que vaya a estar al [employer]  Vuelva a aplicarse la crema solar cada 2 horas  Use ropa protectora, sombrero y lentes para el sol cuando se encuentre afuera  · Conduzca con seguridad  Use siempre romano cinturón de seguridad  Asegúrese que todos en el brittany usan el cinturón de seguridad  Un cinturón de seguridad puede salvar romano pavan en anurag de un accidente automovilístico  No use el celular cuando esté manejando  [place] puede hacer que se distraiga y causar un accidente  Es mejor que pare y se orille si necesita hacer mckayla Reesa Deeds un texto  · Practique el sexo seguro  Use condones de látex si es sexualmente [place] y tiene más de [place] and [place]  Romano médico puede recomendar exámenes de detección de infecciones de transmisión sexual (ITS)  · Use un yunier, un chaleco salvavidas y unos implementos de protección  Siempre use un yunier al [employer] en bicicleta o motocicleta, esquiar o jugar deportes que podrían causar mckayla lesión en la berny  Use implementos de protección cuando practique deportes  Use un chaleco salvavidas cuando esté en un bote o practicando actividades acuáticas  © Copyright [employer] 2021 Information is for End User's use only and may not be sold, redistributed or otherwise used for commercial purposes  All illustrations and images included in CareNotes® are the copyrighted property of A D A Cognea [address] es sólo para uso en educación  Romano intención no es darle un consejo médico sobre enfermedades o tratamientos   Colsulte con romano Beatriz Mall farmacéutico antes de seguir cualquier régimen médico para saber si es seguro y [place] para usted  El eczema   LO QUE NECESITA SABER:   El eczema es un sarpullido locke en la piel que produce Eastman Flakes  Es más probable que usted lo desarrolle si strauss padre o madre o algún miembro de strauss rylan sufren de eczema, asma o fiebre del heno  El eczema es mckayla afección a Dollene Sanborn  Puede tener brotes de vez en cuando jeffrey el ronit de strauss pavan  INSTRUCCIONES SOBRE EL DAWIT HOSPITALARIA:   Regrese a la michael de emergencias si:  · Usted tiene fiebre o nota cely rojizas subiendo por strauss Bishop Sickle o pierna  · Strauss sarpullido se ve más inflamado, locke o caliente  Llame a strauss médico si:  · La mayor parte de strauss piel está Saranya Shakila, inflamada, adolorida y [place] con escamas  · Usted desarrolla costras husain, sangrientas y dolorosas  · Strauss piel se ampolla y expulsa pus becker o amarillo  · Usted tiene preguntas o inquietudes acerca de strauss condición o cuidado  Medicamentos:  · Los medicamentos pueden aliviar la picazón, el enrojecimiento, el dolor y la hinchazón  Se pueden administrar en forma de cremas o pastillas  Puede que también deba dagoberto antibióticos si contrae mckayla infección en la piel  · [place] lisa medicamentos marium se le haya indicado  Consulte con strauss médico si usted aleja que strauss medicamento no le está ayudando o si presenta efectos secundarios  Infórmele si es alérgico a cualquier medicamento  Mantenga mckayla lista actualizada de los [employer], las vitaminas y los productos herbales que meir  Incluya los siguientes datos de los medicamentos: cantidad, frecuencia y motivo de administración  Traiga con usted la lista o los envases de las píldoras a lisa citas de seguimiento  Lleve la lista de los medicamentos con usted en anurag de mckayla emergencia  Cuidado de strauss piel:  · No se rasque  Lily palmaditas o presione la piel para aliviar la picazón  Los síntomas empeorarán si usted se rasca  Mantenga lisa uñas cortas para que no se desgarre la piel si se rasca  · Mantenga strauss piel húmeda   Frote la loción, la crema o el ungüento en la piel al menos 2 veces al día  Pregunte a romano médico que usar y con qué frecuencia  · [place] mckayla ducha o báñese con agua tibia jeffrey 10 minutos o menos  Use jabón suave  Pregúntele a romano médico cuál jabón es adecuado para usted  · Use ropa de algodón  Use ropa holgada de algodón o mezclas de algodón  Evite la ropa de samy  · Use un humidificador para añadir humedad en el aire de romano hogar  · Evite cambios de temperatura , especialmente las actividades que lo hacen sudar mucho  El sudor puede provocar Verlie Chasity  Retire las [employer] de romano cama si usted se acalora mientras duerme  · Evite los alérgenos, polvos e irritantes de la piel  No use perfume, suavizante de ropa o maquillaje que le produzca mckayla sensación de ardor o comezón  Acuda a la consulta de control con romano médico según las indicaciones: Anote carey preguntas para que se acuerde de hacerlas jeffrey carey visitas  © Copyright [employer] 2021 Information is for End User's use only and may not be sold, redistributed or otherwise used for commercial purposes  All illustrations and images included in CareNotes® are the copyrighted property of A D A fastDove , [facility]  or [address] [place] [address] es sólo para uso en educación  Romano intención no es darle un consejo médico sobre enfermedades o tratamientos  Colsulte con romano Gaylyn Harsh farmacéutico antes de seguir cualquier régimen médico para saber si es seguro y efectivo para usted  Insomnio   LO QUE NECESITA SABER:   El insomnio es mckayla afección que hace difícil conciliar el sueño o mantenerse dormido  La falta de sueño puede conllevar a problemas de atención o de la memoria jeffrey el día  Usted también podría estar de malhumor, deprimido, torpe o tener eulalia de [place]  Reeda Livings EL [facility]PITAL[facility]IA:   Comuníquese con roamno médico si:  · Carey síntomas no mejoran o Janeane Ast  · Dairl Vargas a usar drogas o alcohol para conciliar el sueño      · Usted tiene preguntas o inquietudes acerca de romano condición o cuidado  Medicamentos:  · Los medicamentos podrían ayudarle a dormir con más regularidad o ayudarlo a sentirse menos ansioso  · [place] lisa medicamentos marium se le haya indicado  Consulte con romano médico si usted aleja que romano medicamento no le está ayudando o si presenta efectos secundarios  Infórmele si es alérgico a cualquier medicamento  Mantenga mckayla lista actualizada de los [place], las vitaminas y los productos herbales que meir  Incluya los siguientes datos de los medicamentos: cantidad, frecuencia y motivo de administración  Traiga con usted la lista o los envases de las píldoras a lisa citas de seguimiento  Lleve la lista de los medicamentos con usted en anurag de mckayla emergencia  Qué puede hacer para mejorar el sueño:  · Establezca un horario para dormir  Trate de acostarse y levantarse a la misma hora todos los días  Mantenga un registro de los patrones de sueño y cualquier problema para dormir que tenga  Lleve el registro a las citas de seguimiento con lisa médicos  · No tome siestas  Las siestas podrían dificultarle que se quede dormido a la hora de acostarse por la noche  · Mantenga romano habitación fresca, sin ruidos y [place]  [facility] un ruido o lazarus becker marium el del ventilador, para que lo relaje  No utilice romano cama para ninguna actividad que lo mantenga despierto  No demarco ni william ejercicio, ni coma ni iris televisión en romano habitación  · Levántese de la cama si no se queda dormido dentro de 20 minutos  Sammuel Richar a otra habitación y william algo que lo relaje hasta que le de sueño  · Limite el consumo de cafeína, alcohol y de alimentos muy temprano en el día  Solo tome café en la mañana  No tome alcohol dentro de las 6 horas antes de [place]  No coma alimentos pesados shine antes de acostarse  · Realice actividad física con regularidad  El ejercicio diario podría ayudarlo a dormir mejor   No se ejercite dentro de las 4 horas antes de acostarse  Acuda a lisa consultas de control con romano médico según le indicaron  Romano médico podría referirlo a terapia cognitiva conductual  Un terapeuta de la conducta podría ayudarlo a encontrar maneras de [place], de disminuir el estrés y de mejorar el sueño  Anote lisa preguntas para que se acuerde de hacerlas jeffrey lisa visitas  © Copyright [employer] 2021 Information is for End User's use only and may not be sold, redistributed or otherwise used for commercial purposes  All illustrations and images included in CareNotes® are the copyrighted property of [employer]  or [address] [place] [address] es sólo para uso en educación  Romano intención no es darle un consejo médico sobre enfermedades o tratamientos  Colsulte con romano Janine Poncho farmacéutico antes de seguir cualquier régimen médico para saber si es seguro y efectivo para usted

## 2022-01-29 NOTE — ASSESSMENT & PLAN NOTE
- Hydrocortisone 2 5% cream was not effective  Will prescribe triamcinolone 0 1% cream, apply twice daily to affected area  - Advised patient to continue applying OTC lotion such as aveeno or eucerin twice daily and also immediately after showers  - Advised to take fast showers with warm water (not hot)  Advised to pat skin dry rather than rub  Advised to use [place], [place], or BASIS soap products or anything without scents

## 2022-02-17 LAB — EXTERNAL HIV SCREEN: NORMAL

## 2022-02-21 ENCOUNTER — CONSULT (OUTPATIENT)
Dept: PLASTIC SURGERY | Facility: CLINIC | Age: 22
End: 2022-02-21
Payer: COMMERCIAL

## 2022-02-21 VITALS — BODY MASS INDEX: 30.36 KG/M2 | HEIGHT: 61 IN | WEIGHT: 160.8 LBS | TEMPERATURE: 98.2 F

## 2022-02-21 DIAGNOSIS — L72.9 SUBCUTANEOUS CYST: Primary | ICD-10-CM

## 2022-02-21 PROCEDURE — 99203 OFFICE O/P NEW LOW 30 MIN: CPT | Performed by: STUDENT IN AN ORGANIZED HEALTH CARE EDUCATION/TRAINING PROGRAM

## 2022-02-21 NOTE — PROGRESS NOTES
Plastic Surgery Consult    Reason for visit: forehead cyst      HPI:  Patient is a pleasant 24 y/o Colombian-speaking only female who presents with forehead subcutaneous cyst of 6-7 month duration  History was obtained through  phone  Patient states that the lesion occurred spontaneously without history of trauma or insect bite and has slowly increased in size and is causing her discomfort  She has no history of prior lesion  The lesion has never ruptured or expressed purulence  ROS: 12 pt ROS negative, except otherwise noted in HPI      PMH: asthma  FamHx: none  SurgHx: endometriosis surgery  SocHx: hookah, social ETOH  Meds: none  Allergies: NKDA    PE:  Vitals:    02/21/22 0957   Temp: 98 2 °F (36 8 °C)       General: NC/AT, breathing comfortably on RA  Neuro: CN II-XII grossly intact, symmetric reflexes  HEENT: PERRLA, EOMI, external ears normal, no lesions or deformities, neck supple, trachea midline  Respiratory: CTAB, normal respiratory effort  Cardio: RRR, normal S1, S2, no murmur, rubs, gallops  GI: soft, non-tender, non-distended  Extremities/MSK: normal alignment, mobility, gait, no edema    Forehead:  Right forehead with 1x1 cm subcutaneous cystic lesion that is mobile and soft  Tender with palpation  No drainage or signs of infection    A/P: 24 y/o Colombian-speaking only female who presents with right forehead subcutaneous cyst   -Discussed risks, benefits, procedure, and complications which include infection, bleeding, scarring, recurrence, contour deformity and asymmetry, particularly if the lesion is located intramuscularly or submuscular  -All questions answered, concerns addressed  -Consented, will perform excision under local    Edin Harris MD   [address] [place] [address] and Reconstructive Surgery   [address]   [facility], [address] [place] [address]   Office: [phone]

## 2022-02-24 ENCOUNTER — ANESTHESIA (OUTPATIENT)
Dept: ANESTHESIOLOGY | Facility: HOSPITAL | Age: 22
End: 2022-02-24

## 2022-02-24 ENCOUNTER — ANESTHESIA EVENT (OUTPATIENT)
Dept: ANESTHESIOLOGY | Facility: HOSPITAL | Age: 22
End: 2022-02-24

## 2022-02-24 RX ORDER — MULTIVITAMIN
1 TABLET ORAL DAILY
COMMUNITY

## 2022-02-24 NOTE — PRE-PROCEDURE INSTRUCTIONS
Pre-Surgery Instructions:   Medication Instructions    albuterol (2 5 mg/3 mL) 0 083 % nebulizer solution Instructed patient per Anesthesia Guidelines   albuterol (PROVENTIL HFA,VENTOLIN HFA) 90 mcg/act inhaler Instructed patient per Anesthesia Guidelines   Multiple Vitamin (multivitamin) tablet Instructed patient per Anesthesia Guidelines   triamcinolone (KENALOG) 0 1 % cream Instructed patient per Anesthesia Guidelines  Pt instructed to use inhaler if needed the morning of surgery  [facility] preop instructions reviewed with pt  Pt will get antibacterial soap

## 2022-02-25 ENCOUNTER — HOSPITAL ENCOUNTER (OUTPATIENT)
Facility: HOSPITAL | Age: 22
Setting detail: OUTPATIENT SURGERY
Discharge: HOME | End: 2022-02-25
Attending: STUDENT IN AN ORGANIZED HEALTH CARE EDUCATION/TRAINING PROGRAM | Admitting: STUDENT IN AN ORGANIZED HEALTH CARE EDUCATION/TRAINING PROGRAM
Payer: COMMERCIAL

## 2022-02-25 VITALS
RESPIRATION RATE: 18 BRPM | OXYGEN SATURATION: 99 % | WEIGHT: 160 LBS | DIASTOLIC BLOOD PRESSURE: 77 MMHG | TEMPERATURE: 98.1 F | HEIGHT: 62 IN | BODY MASS INDEX: 29.44 KG/M2 | SYSTOLIC BLOOD PRESSURE: 120 MMHG | HEART RATE: 61 BPM

## 2022-02-25 DIAGNOSIS — L72.9 SUBCUTANEOUS CYST: ICD-10-CM

## 2022-02-25 PROCEDURE — 88305 TISSUE EXAM BY PATHOLOGIST: CPT | Performed by: PATHOLOGY

## 2022-02-25 PROCEDURE — 11441 EXC FACE-MM B9+MARG 0.6-1 CM: CPT | Performed by: STUDENT IN AN ORGANIZED HEALTH CARE EDUCATION/TRAINING PROGRAM

## 2022-02-25 PROCEDURE — 11441 EXC FACE-MM B9+MARG 0.6-1 CM: CPT | Performed by: PHYSICIAN ASSISTANT

## 2022-02-25 PROCEDURE — 99024 POSTOP FOLLOW-UP VISIT: CPT | Performed by: STUDENT IN AN ORGANIZED HEALTH CARE EDUCATION/TRAINING PROGRAM

## 2022-02-25 RX ORDER — GINSENG 100 MG
CAPSULE ORAL AS NEEDED
Status: DISCONTINUED | OUTPATIENT
Start: 2022-02-25 | End: 2022-02-25 | Stop reason: HOSPADM

## 2022-02-25 RX ORDER — GABAPENTIN 300 MG/1
300 CAPSULE ORAL ONCE
Status: DISCONTINUED | OUTPATIENT
Start: 2022-02-25 | End: 2022-02-25 | Stop reason: HOSPADM

## 2022-02-25 RX ORDER — ACETAMINOPHEN 325 MG/1
975 TABLET ORAL ONCE
Status: COMPLETED | OUTPATIENT
Start: 2022-02-25 | End: 2022-02-25

## 2022-02-25 RX ORDER — LIDOCAINE HYDROCHLORIDE AND EPINEPHRINE 10; 10 MG/ML; UG/ML
INJECTION, SOLUTION INFILTRATION; PERINEURAL AS NEEDED
Status: DISCONTINUED | OUTPATIENT
Start: 2022-02-25 | End: 2022-02-25 | Stop reason: HOSPADM

## 2022-02-25 RX ADMIN — ACETAMINOPHEN 975 MG: 325 TABLET ORAL at 11:08

## 2022-02-25 NOTE — H&P
Plastic Surgery Attending    H&P reviewed, no new changes  Pau Jones MD   [facility]astic and Reconstructive Surgery   [address], [address] [facility], [address] [place] [address]   Office: [phone]      Plastic Surgery Consult     Reason for visit: forehead cyst        HPI:  Patient is a pleasant 24 y/o Northern Irish-speaking only female who presents with forehead subcutaneous cyst of 6-7 month duration  History was obtained through  phone  Patient states that the lesion occurred spontaneously without history of trauma or insect bite and has slowly increased in size and is causing her discomfort  She has no history of prior lesion   The lesion has never ruptured or expressed purulence      ROS: 12 pt ROS negative, except otherwise noted in HPI      PMH: asthma  FamHx: none  SurgHx: endometriosis surgery  SocHx: hookah, social ETOH  Meds: none  Allergies: NKDA         PE:  Vitals:     02/21/22 0957   Temp: 98 2 °F (36 8 °C)         General: NC/AT, breathing comfortably on RA  Neuro: CN II-XII grossly intact, symmetric reflexes  HEENT: PERRLA, EOMI, external ears normal, no lesions or deformities, neck supple, trachea midline  Respiratory: CTAB, normal respiratory effort  Cardio: RRR, normal S1, S2, no murmur, rubs, gallops  GI: soft, non-tender, non-distended  Extremities/MSK: normal alignment, mobility, gait, no edema     Forehead:  Right forehead with 1x1 cm subcutaneous cystic lesion that is mobile and soft  Tender with palpation  No drainage or signs of infection     A/P: 24 y/o Northern Irish-speaking only female who presents with right forehead subcutaneous cyst   -Discussed risks, benefits, procedure, and complications which include infection, bleeding, scarring, recurrence, contour deformity and asymmetry, particularly if the lesion is located intramuscularly or submuscular  -All questions answered, concerns addressed  -Consented, will perform excision under local     Pau Jones MD   [facility] Plastic and Reconstructive Surgery   [address], [address]   Office: [phone]

## 2022-02-25 NOTE — DISCHARGE SUMMARY
Discharge Summary - Plastic Surgery   Henri Villarreal 25 y o  female MRN: [id number]  Unit/Bed#: OR POOL Encounter: [id number]    Admission Date:  2/25/22    Discharge Date: 2/25/22    Admitting Diagnosis: Subcutaneous cyst [L72 9]    Discharge Diagnosis: same    Medical Problems             Resolved Problems  Date Reviewed: 1/28/2022    None                Attending: Seble [place]more Physician(s): same    Procedures Performed: Excision of forehead subcutaneous cyst with closure    Pathology: sent    Hospital Course: Patient underwent above noted procedure without complication and was discharged with RTC in 5 days for suture removal     Condition at Discharge: good     Discharge instructions/Information to patient and family:   See after visit summary for information provided to patient and family  Provisions for Follow-Up Care:  See after visit summary for information related to follow-up care and any pertinent home health orders  Disposition: Home          Planned Readmission: No    Discharge Statement   I spent 10 minutes discharging the patient  This time was spent on the day of discharge  I had direct contact with the patient on the day of discharge  Additional documentation is required if more than 30 minutes were spent on discharge  Discharge Medications:  See after visit summary for reconciled discharge medications provided to patient and family

## 2022-02-25 NOTE — OP NOTE
OPERATIVE REPORT  PATIENT NAME: Henri Villarreal    :  2000  MRN: [id number]  Pt Location: [facility] OR ROOM 15    SURGERY DATE: 2022    Surgeon(s) and Role:     * Kyung Loco MD - Primary     * Ruma Anaya PA-C - Assisting    Preop Diagnosis:  Subcutaneous cyst [L72 9]    Post-Op Diagnosis Codes:     * Subcutaneous cyst [L72 9]    Procedure(s) (LRB):  1  Excision of forehead subcutaneous cyst (0 6x0 7cm) with closure (1 5 cm)    Specimen(s):  ID Type Source Tests Collected by Time Destination   1 : forehead Tissue Cyst TISSUE EXAM Kyung Loco MD 2022 12:52 PM        Estimated Blood Loss:   Minimal    Drains:  * No LDAs found *    Anesthesia Type:   Local    Operative Indications:  Subcutaneous cyst [L72 9]    Operative Findings:  0 7x0 6 cm sebaceous cyst of forehead    Complications:   None    Procedure and Technique:  Patient was brought to the operating room, transferred the operating table in supine fashion  After injecting 5 cc of 1% lidocaine with epi to locally anesthetize the area  A 1 5 cm transverse incision was made overlying the lesion  The lesion was encountered and was consistent with sebaceous cyst, measuring 0 7x0 6 cm  This was excised enbloc and sent for path  The wound was irrigated and hemostasis achieved with electrocautery  Due to the lesion being so superficial under the skin, closure was performed with 5-0 nylon in interrupted fashion 9total 1 5 cm) followed by bacitracin overlying the incision  This concluded the procedure  Patient tolerated the procedure well without complications  At the end of the case, all sponge, needle, and instrument counts were correct  Patient was awakened from anesthesia and taken to the PACU in stable condition      I was present for the entire procedure, A qualified resident physician was not available and A physician assistant was required during the procedure for retraction, tissue handling, dissection and suturing      Patient Disposition:  PACU       SIGNATURE: Fred Robles MD  DATE: February 25, 2022  TIME: 1:41 PM

## 2022-02-25 NOTE — DISCHARGE INSTRUCTIONS
[facility]  Dr Janusz Guerra [address], [address]  Phone: [phone]     Postoperative Instructions for Outpatient Surgery     These instructions are being provided by your doctor to give you basic guidelines during your post-op recovery  Please let our office know if your contact information has changed  Please call the office today for an appointment in 5 days for your first postoperative care visit  Please call my office at any time if you have drainage from incisions, increased redness or swelling, or a fever greater than 100 5  Dressings: Bacitracin to incision twice daily  Activity Restrictions: No strenuous activities  Bathing: You may shower in 24 hours  Mild soap and water  Pat incision dry  Please do not rub incision  Medications:    Resume pre-op medications     You may take tylenol, aleve, or ibuprofen for pain control

## 2022-03-07 ENCOUNTER — OFFICE VISIT (OUTPATIENT)
Dept: PLASTIC SURGERY | Facility: CLINIC | Age: 22
End: 2022-03-07

## 2022-03-07 DIAGNOSIS — L72.9 SUBCUTANEOUS CYST: Primary | ICD-10-CM

## 2022-03-07 NOTE — PROGRESS NOTES
Patient was seen today for suture removal, excision of forehead subcutaneous cyst on 2/25/22  Sutures were identified and removed without difficulty  Incision appeared dry and intact  We will follow up with patient as needed  She was encouraged to give us a call with any questions or concerns

## 2022-03-29 ENCOUNTER — APPOINTMENT (EMERGENCY)
Dept: CT IMAGING | Facility: HOSPITAL | Age: 22
End: 2022-03-29
Payer: COMMERCIAL

## 2022-03-29 ENCOUNTER — HOSPITAL ENCOUNTER (EMERGENCY)
Facility: HOSPITAL | Age: 22
Discharge: HOME | End: 2022-03-29
Attending: EMERGENCY MEDICINE
Payer: COMMERCIAL

## 2022-03-29 VITALS
TEMPERATURE: 97.6 F | WEIGHT: 163.1 LBS | RESPIRATION RATE: 16 BRPM | HEART RATE: 74 BPM | BODY MASS INDEX: 29.83 KG/M2 | SYSTOLIC BLOOD PRESSURE: 127 MMHG | OXYGEN SATURATION: 100 % | DIASTOLIC BLOOD PRESSURE: 84 MMHG

## 2022-03-29 DIAGNOSIS — R10.9 ABDOMINAL PAIN, UNSPECIFIED ABDOMINAL LOCATION: Primary | ICD-10-CM

## 2022-03-29 DIAGNOSIS — K62.89 RECTAL PAIN: ICD-10-CM

## 2022-03-29 LAB
ALBUMIN SERPL BCP-MCNC: 4.3 G/DL (ref 3–5.2)
ALP SERPL-CCNC: 68 U/L (ref 43–122)
ALT SERPL W P-5'-P-CCNC: 23 U/L
ANION GAP SERPL CALCULATED.3IONS-SCNC: 8 MMOL/L (ref 5–14)
AST SERPL W P-5'-P-CCNC: 27 U/L (ref 14–36)
BACTERIA UR QL AUTO: ABNORMAL /HPF
BASOPHILS # BLD AUTO: 0.1 THOUSANDS/ΜL (ref 0–0.1)
BASOPHILS NFR BLD AUTO: 1 % (ref 0–1)
BILIRUB SERPL-MCNC: 0.68 MG/DL
BILIRUB UR QL STRIP: NEGATIVE
BUN SERPL-MCNC: 10 MG/DL (ref 5–25)
CALCIUM SERPL-MCNC: 8.9 MG/DL (ref 8.4–10.2)
CAOX CRY URNS QL MICRO: ABNORMAL /HPF
CHLORIDE SERPL-SCNC: 103 MMOL/L (ref 97–108)
CLARITY UR: CLEAR
CO2 SERPL-SCNC: 27 MMOL/L (ref 22–30)
COLOR UR: ABNORMAL
CREAT SERPL-MCNC: 0.66 MG/DL (ref 0.6–1.2)
EOSINOPHIL # BLD AUTO: 0.3 THOUSAND/ΜL (ref 0–0.4)
EOSINOPHIL NFR BLD AUTO: 5 % (ref 0–6)
ERYTHROCYTE [DISTWIDTH] IN BLOOD BY AUTOMATED COUNT: 13.1 %
EXT PREG TEST URINE: NEGATIVE
EXT. CONTROL ED NAV: NORMAL
GFR SERPL CREATININE-BSD FRML MDRD: 126 ML/MIN/1.73SQ M
GLUCOSE SERPL-MCNC: 98 MG/DL (ref 70–99)
GLUCOSE UR STRIP-MCNC: NEGATIVE MG/DL
HCT VFR BLD AUTO: 41.1 % (ref 36–46)
HGB BLD-MCNC: 14.2 G/DL (ref 12–16)
HGB UR QL STRIP.AUTO: NEGATIVE
KETONES UR STRIP-MCNC: NEGATIVE MG/DL
LEUKOCYTE ESTERASE UR QL STRIP: NEGATIVE
LIPASE SERPL-CCNC: 134 U/L (ref 23–300)
LYMPHOCYTES # BLD AUTO: 1.7 THOUSANDS/ΜL (ref 0.5–4)
LYMPHOCYTES NFR BLD AUTO: 29 % (ref 25–45)
MCH RBC QN AUTO: 31.7 PG (ref 26–34)
MCHC RBC AUTO-ENTMCNC: 34.7 G/DL (ref 31–36)
MCV RBC AUTO: 92 FL (ref 80–100)
MONOCYTES # BLD AUTO: 0.5 THOUSAND/ΜL (ref 0.2–0.9)
MONOCYTES NFR BLD AUTO: 9 % (ref 1–10)
MUCOUS THREADS UR QL AUTO: ABNORMAL
NEUTROPHILS # BLD AUTO: 3.3 THOUSANDS/ΜL (ref 1.8–7.8)
NEUTS SEG NFR BLD AUTO: 57 % (ref 45–65)
NITRITE UR QL STRIP: NEGATIVE
NON-SQ EPI CELLS URNS QL MICRO: ABNORMAL /HPF
PH UR STRIP.AUTO: 6 [PH]
PLATELET # BLD AUTO: 207 THOUSANDS/UL (ref 150–450)
PMV BLD AUTO: 10 FL (ref 8.9–12.7)
POTASSIUM SERPL-SCNC: 3.7 MMOL/L (ref 3.6–5)
PROT SERPL-MCNC: 7.5 G/DL (ref 5.9–8.4)
PROT UR STRIP-MCNC: ABNORMAL MG/DL
RBC # BLD AUTO: 4.49 MILLION/UL (ref 4–5.2)
RBC #/AREA URNS AUTO: ABNORMAL /HPF
SODIUM SERPL-SCNC: 138 MMOL/L (ref 137–147)
SP GR UR STRIP.AUTO: 1.02 (ref 1–1.04)
UROBILINOGEN UA: NEGATIVE MG/DL
WBC # BLD AUTO: 5.8 THOUSAND/UL (ref 4.5–11)
WBC #/AREA URNS AUTO: ABNORMAL /HPF

## 2022-03-29 PROCEDURE — 81001 URINALYSIS AUTO W/SCOPE: CPT | Performed by: PHYSICIAN ASSISTANT

## 2022-03-29 PROCEDURE — 99284 EMERGENCY DEPT VISIT MOD MDM: CPT | Performed by: PHYSICIAN ASSISTANT

## 2022-03-29 PROCEDURE — 99285 EMERGENCY DEPT VISIT HI MDM: CPT

## 2022-03-29 PROCEDURE — 85025 COMPLETE CBC W/AUTO DIFF WBC: CPT | Performed by: PHYSICIAN ASSISTANT

## 2022-03-29 PROCEDURE — 96361 HYDRATE IV INFUSION ADD-ON: CPT

## 2022-03-29 PROCEDURE — 96374 THER/PROPH/DIAG INJ IV PUSH: CPT

## 2022-03-29 PROCEDURE — 74177 CT ABD & PELVIS W/CONTRAST: CPT

## 2022-03-29 PROCEDURE — 81025 URINE PREGNANCY TEST: CPT | Performed by: PHYSICIAN ASSISTANT

## 2022-03-29 PROCEDURE — 83690 ASSAY OF LIPASE: CPT | Performed by: PHYSICIAN ASSISTANT

## 2022-03-29 PROCEDURE — 36415 COLL VENOUS BLD VENIPUNCTURE: CPT | Performed by: PHYSICIAN ASSISTANT

## 2022-03-29 PROCEDURE — 81003 URINALYSIS AUTO W/O SCOPE: CPT | Performed by: PHYSICIAN ASSISTANT

## 2022-03-29 PROCEDURE — 80053 COMPREHEN METABOLIC PANEL: CPT | Performed by: PHYSICIAN ASSISTANT

## 2022-03-29 RX ORDER — ACETAMINOPHEN 325 MG/1
650 TABLET ORAL ONCE
Status: COMPLETED | OUTPATIENT
Start: 2022-03-29 | End: 2022-03-29

## 2022-03-29 RX ORDER — KETOROLAC TROMETHAMINE 30 MG/ML
30 INJECTION, SOLUTION INTRAMUSCULAR; INTRAVENOUS ONCE
Status: COMPLETED | OUTPATIENT
Start: 2022-03-29 | End: 2022-03-29

## 2022-03-29 RX ORDER — SODIUM CHLORIDE 9 MG/ML
250 INJECTION, SOLUTION INTRAVENOUS CONTINUOUS
Status: DISCONTINUED | OUTPATIENT
Start: 2022-03-29 | End: 2022-03-29 | Stop reason: HOSPADM

## 2022-03-29 RX ADMIN — KETOROLAC TROMETHAMINE 30 MG: 30 INJECTION, SOLUTION INTRAMUSCULAR; INTRAVENOUS at 12:03

## 2022-03-29 RX ADMIN — SODIUM CHLORIDE 250 ML/HR: 0.9 INJECTION, SOLUTION INTRAVENOUS at 11:08

## 2022-03-29 RX ADMIN — ACETAMINOPHEN 325MG 650 MG: 325 TABLET ORAL at 11:12

## 2022-03-29 RX ADMIN — IOHEXOL 100 ML: 350 INJECTION, SOLUTION INTRAVENOUS at 13:26

## 2022-03-29 NOTE — ED PROVIDER NOTES
History  Chief Complaint   Patient presents with    Rectal Bleeding     pt c/o rectal bleeding x4 days along with constipation     Pt with rectal pain and seeing blood when going to bathroom  ,pt with some abd pain , no nausea , eating normal, no vaginal problems       Abdominal Pain  Pain location:  Suprapubic  Pain quality: aching    Pain radiates to:  Does not radiate  Pain severity:  Mild  Onset quality:  Gradual  Duration:  3 days  Timing:  Constant  Progression:  Unchanged (+rectal pain and blood with wiping )  Relieved by:  Nothing  Worsened by:  Nothing  Ineffective treatments:  None tried  Associated symptoms: constipation    Risk factors: no alcohol abuse        Prior to Admission Medications   Prescriptions Last Dose Informant Patient Reported? Taking?    Multiple Vitamin (multivitamin) tablet  Self Yes No   Sig: Take 1 tablet by mouth daily   albuterol (2 5 mg/3 mL) 0 083 % nebulizer solution   No No   Sig: Take 3 mL (2 5 mg total) by nebulization every 6 (six) hours as needed for wheezing or shortness of breath   albuterol (PROVENTIL HFA,VENTOLIN HFA) 90 mcg/act inhaler   No No   Sig: Inhale 1-2 puffs every 6 (six) hours as needed for wheezing or shortness of breath   triamcinolone (KENALOG) 0 1 % cream  Self No No   Sig: Apply topically 2 (two) times a day   Patient taking differently: Apply topically as needed        Facility-Administered Medications: None       Past Medical History:   Diagnosis Date    Asthma     Endometriosis     GERD (gastroesophageal reflux disease)        Past Surgical History:   Procedure Laterality Date    LAPAROSCOPY      for endometriosis    [place] EXC SKIN BENIG >4 CM FACE,FACIAL N/A 2/25/2022    Procedure: EXCISION OF FOREHEAD SUBCUTANEOUS LESION WITH COMPLEX CLOSURE;  Surgeon: Lexie Taylor MD;  Location: [facility];  Service: Plastics       Family History   Problem Relation Age of Onset    Anxiety disorder Mother     Depression Mother     Asthma Mother     Asthma Brother      I have reviewed and agree with the history as documented  E-Cigarette/Vaping    E-Cigarette Use Never User      E-Cigarette/Vaping Substances     Social History     Tobacco Use    Smoking status: Current Some Day Smoker    Smokeless tobacco: Never Used    Tobacco comment: hookah soically    Vaping Use    Vaping Use: Never used   Substance Use Topics    Alcohol use: Yes    Drug use: Not Currently       Review of Systems   Constitutional: Negative  HENT: Negative  Eyes: Negative  Respiratory: Negative  Cardiovascular: Negative  Gastrointestinal: Positive for abdominal pain, anal bleeding, constipation and rectal pain  Endocrine: Negative  Genitourinary: Negative  Musculoskeletal: Negative  Skin: Negative  Allergic/Immunologic: Negative  Neurological: Negative  Hematological: Negative  Psychiatric/Behavioral: Negative  All other systems reviewed and are negative  Physical Exam  Physical Exam  Vitals and nursing note reviewed  Exam conducted with a chaperone present (nurse aid sole )  Constitutional:       Appearance: She is normal weight  HENT:      Head: Normocephalic and atraumatic  Right Ear: Tympanic membrane, ear canal and external ear normal       Left Ear: Tympanic membrane, ear canal and external ear normal       Nose: Nose normal       Mouth/Throat:      Mouth: Mucous membranes are moist       Pharynx: Oropharynx is clear  Eyes:      Extraocular Movements: Extraocular movements intact  Conjunctiva/sclera: Conjunctivae normal       Pupils: Pupils are equal, round, and reactive to light  Cardiovascular:      Rate and Rhythm: Normal rate and regular rhythm  Pulses: Normal pulses  Heart sounds: Normal heart sounds  Pulmonary:      Effort: Pulmonary effort is normal       Breath sounds: Normal breath sounds  Abdominal:      General: Abdomen is flat  Bowel sounds are normal       Palpations: Abdomen is soft  Tenderness: There is no guarding or rebound  Comments: Suprapubic and rlq pain    Genitourinary:     Comments: Pt declines vaginal and rectal examination declines any visualation of either area   Musculoskeletal:         General: Normal range of motion  Cervical back: Normal range of motion and neck supple  Skin:     General: Skin is warm  Capillary Refill: Capillary refill takes less than 2 seconds  Neurological:      General: No focal deficit present  Mental Status: She is alert and oriented to person, place, and time     Psychiatric:         Mood and Affect: Mood normal          Behavior: Behavior normal          Vital Signs  ED Triage Vitals   Temperature Pulse Respirations Blood Pressure SpO2   03/29/22 1019 03/29/22 1019 03/29/22 1019 03/29/22 1019 03/29/22 1019   97 6 °F (36 4 °C) 70 20 127/81 100 %      Temp Source Heart Rate Source Patient Position - Orthostatic VS BP Location FiO2 (%)   03/29/22 1019 03/29/22 1019 03/29/22 1019 03/29/22 1019 --   Oral Monitor Sitting Left arm       Pain Score       03/29/22 1112       8           Vitals:    03/29/22 1019 03/29/22 1200 03/29/22 1417   BP: 127/81 130/78 127/84   Pulse: 70 70 74   Patient Position - Orthostatic VS: Sitting  Standing         Visual Acuity      ED Medications  Medications   acetaminophen (TYLENOL) tablet 650 mg (650 mg Oral Given 3/29/22 1112)   ketorolac (TORADOL) injection 30 mg (30 mg Intravenous Given 3/29/22 1203)   iohexol (OMNIPAQUE) 350 MG/ML injection (SINGLE-DOSE) 100 mL (100 mL Intravenous Given 3/29/22 1326)       Diagnostic Studies  Results Reviewed     Procedure Component Value Units Date/Time    Lipase [[id number]]  (Normal) Collected: 03/29/22 1108    Lab Status: Final result Specimen: Blood from Arm, Right Updated: 03/29/22 1142     Lipase 134 u/L     Comprehensive metabolic panel [[id number]] Collected: 03/29/22 1108    Lab Status: Final result Specimen: Blood from Arm, Right Updated: 03/29/22 1142 Sodium 138 mmol/L      Potassium 3 7 mmol/L      Chloride 103 mmol/L      CO2 27 mmol/L      ANION GAP 8 mmol/L      BUN 10 mg/dL      Creatinine 0 66 mg/dL      Glucose 98 mg/dL      Calcium 8 9 mg/dL      AST 27 U/L      ALT 23 U/L      Alkaline Phosphatase 68 U/L      Total Protein 7 5 g/dL      Albumin 4 3 g/dL      Total Bilirubin 0 68 mg/dL      eGFR 126 ml/min/1 73sq m     Narrative:      National Kidney Disease Foundation guidelines for Chronic Kidney Disease (CKD):     Stage 1 with normal or high GFR (GFR > 90 mL/min/1 73 square meters)    Stage 2 Mild CKD (GFR = 60-89 mL/min/1 73 square meters)    Stage 3A Moderate CKD (GFR = 45-59 mL/min/1 73 square meters)    Stage 3B Moderate CKD (GFR = 30-44 mL/min/1 73 square meters)    Stage 4 Severe CKD (GFR = 15-29 mL/min/1 73 square meters)    Stage 5 End Stage CKD (GFR <15 mL/min/1 73 square meters)  Note: GFR calculation is accurate only with a steady state creatinine    Urine Microscopic [[id number]]  (Abnormal) Collected: 03/29/22 1056    Lab Status: Final result Specimen: Urine, Clean Catch Updated: 03/29/22 1142     RBC, UA 0-1 /hpf      WBC, UA 0-1 /hpf      Epithelial Cells Moderate /hpf      Bacteria, UA Occasional /hpf      Ca Oxalate Anayeli, UA Occasional /hpf      MUCUS THREADS Innumerable    UA w Reflex to Microscopic w Reflex to Culture [[id number]]  (Abnormal) Collected: 03/29/22 1056    Lab Status: Final result Specimen: Urine, Clean Catch Updated: 03/29/22 1133     Color, UA Irasema     Clarity, UA Clear     Specific Gravity, UA 1 025     pH, UA 6 0     Leukocytes, UA Negative     Nitrite, UA Negative     Protein, UA 15 (Trace) mg/dl      Glucose, UA Negative mg/dl      Ketones, UA Negative mg/dl      Bilirubin, UA Negative     Blood, UA Negative     UROBILINOGEN UA Negative mg/dL     CBC and differential [[id number]]  (Normal) Collected: 03/29/22 1108    Lab Status: Final result Specimen: Blood from Arm, Right Updated: 03/29/22 1124     WBC 5 80 Thousand/uL      RBC 4 49 Million/uL      Hemoglobin 14 2 g/dL      Hematocrit 41 1 %      MCV 92 fL      MCH 31 7 pg      MCHC 34 7 g/dL      RDW 13 1 %      MPV 10 0 fL      Platelets 871 Thousands/uL      Neutrophils Relative 57 %      Lymphocytes Relative 29 %      Monocytes Relative 9 %      Eosinophils Relative 5 %      Basophils Relative 1 %      Neutrophils Absolute 3 30 Thousands/µL      Lymphocytes Absolute 1 70 Thousands/µL      Monocytes Absolute 0 50 Thousand/µL      Eosinophils Absolute 0 30 Thousand/µL      Basophils Absolute 0 10 Thousands/µL     POCT pregnancy, urine [[id number]]  (Normal) Resulted: 03/29/22 1100    Lab Status: Final result Updated: 03/29/22 1100     EXT PREG TEST UR (Ref: Negative) Negative     Control Valid                 CT abdomen pelvis with contrast   Final Result by Yamil Whitaker MD (03/29 1350)      No acute CT finding in the abdomen or pelvis  Workstation performed: [id number]                    Procedures  Procedures         ED Course  ED Course as of 03/29/22 1708   Tue Mar 29, 2022   1240 CT abdomen pelvis with contrast                               SBIRT 20yo+      Most Recent Value   SBIRT (23 yo +)    In order to provide better care to our patients, we are screening all of our patients for alcohol and drug use  Would it be okay to ask you these screening questions? No Filed at: 03/29/2022 1109                    MDM    Disposition  Final diagnoses:   Abdominal pain, unspecified abdominal location   Rectal pain     Time reflects when diagnosis was documented in both MDM as applicable and the Disposition within this note     Time User Action Codes Description Comment    3/29/2022  2:13 PM Reather Ali  Add [R10 9] Abdominal pain, unspecified abdominal location     3/29/2022  2:13 PM Reather Ali   Add [K62 89] Rectal pain       ED Disposition     ED Disposition Condition Date/Time Comment    Discharge Stable Tue Mar 29, 2022  2:13 PM Murali Anderson Carlito discharge to home/self care  Follow-up Information     Follow up With Specialties Details Why Rianna Coreas MD Family Medicine  recheck with your family doctor [address]  [phone]            Discharge Medication List as of 3/29/2022  2:14 PM      CONTINUE these medications which have NOT CHANGED    Details   albuterol (2 5 mg/3 mL) 0 083 % nebulizer solution Take 3 mL (2 5 mg total) by nebulization every 6 (six) hours as needed for wheezing or shortness of breath, Starting Tue 1/25/2022, Normal      albuterol (PROVENTIL HFA,VENTOLIN HFA) 90 mcg/act inhaler Inhale 1-2 puffs every 6 (six) hours as needed for wheezing or shortness of breath, Starting Tue 1/25/2022, Normal      Multiple Vitamin (multivitamin) tablet Take 1 tablet by mouth daily, Historical Med      triamcinolone (KENALOG) 0 1 % cream Apply topically 2 (two) times a day, Starting Tue 1/25/2022, Normal             No discharge procedures on file      PDMP Review       Value Time User    PDMP Reviewed  Yes 2/25/2022 12:45 PM Hailey Silvestre PA-C          ED Provider  Electronically Signed by           Anny Nicole PA-C  03/29/22 2391

## 2022-05-16 ENCOUNTER — HOSPITAL ENCOUNTER (EMERGENCY)
Facility: HOSPITAL | Age: 22
Discharge: HOME | End: 2022-05-16
Attending: EMERGENCY MEDICINE
Payer: COMMERCIAL

## 2022-05-16 VITALS
SYSTOLIC BLOOD PRESSURE: 129 MMHG | RESPIRATION RATE: 16 BRPM | WEIGHT: 160.3 LBS | HEART RATE: 70 BPM | OXYGEN SATURATION: 100 % | TEMPERATURE: 98.5 F | DIASTOLIC BLOOD PRESSURE: 83 MMHG | BODY MASS INDEX: 29.32 KG/M2

## 2022-05-16 DIAGNOSIS — R10.9 ABDOMINAL PAIN: Primary | ICD-10-CM

## 2022-05-16 DIAGNOSIS — B34.9 VIRAL ILLNESS: ICD-10-CM

## 2022-05-16 LAB
BILIRUB UR QL STRIP: NEGATIVE
CLARITY UR: CLEAR
COLOR UR: NORMAL
EXT PREG TEST URINE: NEGATIVE
EXT. CONTROL ED NAV: NORMAL
GLUCOSE UR STRIP-MCNC: NEGATIVE MG/DL
HGB UR QL STRIP.AUTO: NEGATIVE
KETONES UR STRIP-MCNC: NEGATIVE MG/DL
LEUKOCYTE ESTERASE UR QL STRIP: NEGATIVE
NITRITE UR QL STRIP: NEGATIVE
PH UR STRIP.AUTO: 7 [PH]
PROT UR STRIP-MCNC: NEGATIVE MG/DL
SP GR UR STRIP.AUTO: 1.01 (ref 1–1.04)
UROBILINOGEN UA: NEGATIVE MG/DL

## 2022-05-16 PROCEDURE — 99284 EMERGENCY DEPT VISIT MOD MDM: CPT | Performed by: EMERGENCY MEDICINE

## 2022-05-16 PROCEDURE — 87636 SARSCOV2 & INF A&B AMP PRB: CPT | Performed by: EMERGENCY MEDICINE

## 2022-05-16 PROCEDURE — 99284 EMERGENCY DEPT VISIT MOD MDM: CPT

## 2022-05-16 PROCEDURE — 81025 URINE PREGNANCY TEST: CPT | Performed by: EMERGENCY MEDICINE

## 2022-05-16 RX ORDER — ONDANSETRON 4 MG/1
4 TABLET, ORALLY DISINTEGRATING ORAL ONCE
Status: COMPLETED | OUTPATIENT
Start: 2022-05-16 | End: 2022-05-16

## 2022-05-16 RX ORDER — ONDANSETRON 4 MG/1
4 TABLET, FILM COATED ORAL EVERY 6 HOURS
Qty: 12 TABLET | Refills: 0 | Status: SHIPPED | OUTPATIENT
Start: 2022-05-16

## 2022-05-16 RX ADMIN — ONDANSETRON 4 MG: 4 TABLET, ORALLY DISINTEGRATING ORAL at 11:01

## 2022-05-16 NOTE — Clinical Note
Bala Moffett was seen and treated in our emergency department on 5/16/2022  Diagnosis:     Kylah    She may return on this date: 05/19/2022         If you have any questions or concerns, please don't hesitate to call        Celena Singleton DO    ______________________________           _______________          _______________  Hospital Representative                              Date                                Time

## 2022-05-16 NOTE — ED PROVIDER NOTES
History  Chief Complaint   Patient presents with    Abdominal Pain     Since Friday, with nausea       Sore Throat     Patient is a 72-year-old female, history of asthma, endometriosis and GERD  She has been having mild nausea without vomiting or diarrhea as well as sore throat for the last 3 days  Denies diarrhea  Denies previous abdominal surgery  No fevers or chills  Patient [place]ian speaking,  service use  She denies any recent sick contacts or antibiotic use  Nothing is making her symptoms significantly better or worse  Prior to Admission Medications   Prescriptions Last Dose Informant Patient Reported? Taking? Multiple Vitamin (multivitamin) tablet  Self Yes No   Sig: Take 1 tablet by mouth daily   albuterol (2 5 mg/3 mL) 0 083 % nebulizer solution   No No   Sig: Take 3 mL (2 5 mg total) by nebulization every 6 (six) hours as needed for wheezing or shortness of breath   albuterol (PROVENTIL HFA,VENTOLIN HFA) 90 mcg/act inhaler   No No   Sig: Inhale 1-2 puffs every 6 (six) hours as needed for wheezing or shortness of breath   triamcinolone (KENALOG) 0 1 % cream  Self No No   Sig: Apply topically 2 (two) times a day   Patient taking differently: Apply topically as needed        Facility-Administered Medications: None       Past Medical History:   Diagnosis Date    Asthma     Endometriosis     GERD (gastroesophageal reflux disease)        Past Surgical History:   Procedure Laterality Date    LAPAROSCOPY      for endometriosis    [place] EXC SKIN BENIG >4 CM FACE,FACIAL N/A 2/25/2022    Procedure: EXCISION OF FOREHEAD SUBCUTANEOUS LESION WITH COMPLEX CLOSURE;  Surgeon: Benito Arndt MD;  Location: [facility];  Service: Plastics       Family History   Problem Relation Age of Onset    Anxiety disorder Mother     Depression Mother     Asthma Mother     Asthma Brother      I have reviewed and agree with the history as documented      E-Cigarette/Vaping    E-Cigarette Use Never User E-Cigarette/Vaping Substances     Social History     Tobacco Use    Smoking status: Current Some Day Smoker    Smokeless tobacco: Never Used    Tobacco comment: [employer]    Vaping Use    Vaping Use: Never used   Substance Use Topics    Alcohol use: Yes     Comment: socially    Drug use: Never       Review of Systems   Constitutional: Negative  Negative for chills and fever  HENT: Negative  Negative for rhinorrhea, sore throat, trouble swallowing and voice change  Eyes: Negative  Negative for pain and visual disturbance  Respiratory: Negative  Negative for cough, shortness of breath and wheezing  Cardiovascular: Negative  Negative for chest pain and palpitations  Gastrointestinal: Positive for nausea  Negative for abdominal pain, diarrhea and vomiting  Genitourinary: Negative  Negative for dysuria and frequency  Musculoskeletal: Negative  Negative for neck pain and neck stiffness  Skin: Negative  Negative for rash  Neurological: Negative  Negative for dizziness, speech difficulty, weakness, light-headedness and numbness  Physical Exam  Physical Exam  Vitals and nursing note reviewed  Constitutional:       General: She is not in acute distress  Appearance: She is well-developed  HENT:      Head: Normocephalic and atraumatic  Eyes:      Conjunctiva/sclera: Conjunctivae normal       Pupils: Pupils are equal, round, and reactive to light  Neck:      Trachea: No tracheal deviation  Cardiovascular:      Rate and Rhythm: Normal rate and regular rhythm  Pulmonary:      Effort: Pulmonary effort is normal  No respiratory distress  Breath sounds: Normal breath sounds  No wheezing or rales  Abdominal:      General: Bowel sounds are normal  There is no distension  Palpations: Abdomen is soft  Tenderness: There is no abdominal tenderness  There is no guarding or rebound  Musculoskeletal:         General: No tenderness or deformity   Normal range of motion  Cervical back: Normal range of motion and neck supple  Skin:     General: Skin is warm and dry  Capillary Refill: Capillary refill takes less than 2 seconds  Findings: No rash  Neurological:      Mental Status: She is alert and oriented to person, place, and time  Psychiatric:         Behavior: Behavior normal          Vital Signs  ED Triage Vitals [05/16/22 0941]   Temperature Pulse Respirations Blood Pressure SpO2   98 5 °F (36 9 °C) 65 16 113/59 99 %      Temp Source Heart Rate Source Patient Position - Orthostatic VS BP Location FiO2 (%)   Oral Monitor Sitting Left arm --      Pain Score       --           Vitals:    05/16/22 0941 05/16/22 1151   BP: 113/59 129/83   Pulse: 65 70   Patient Position - Orthostatic VS: Sitting Sitting         Visual Acuity      ED Medications  Medications   ondansetron (ZOFRAN-ODT) dispersible tablet 4 mg (4 mg Oral Given 5/16/22 1101)       Diagnostic Studies  Results Reviewed     Procedure Component Value Units Date/Time    UA (URINE) with reflex to Scope [[id number]]  (Normal) Collected: 05/16/22 1039    Lab Status: Final result Specimen: Urine, Clean Catch Updated: 05/16/22 1109     Color, UA Straw     Clarity, UA Clear     Specific Gravity, UA 1 015     pH, UA 7 0     Leukocytes, UA Negative     Nitrite, UA Negative     Protein, UA Negative mg/dl      Glucose, UA Negative mg/dl      Ketones, UA Negative mg/dl      Bilirubin, UA Negative     Blood, UA Negative     UROBILINOGEN UA Negative mg/dL     COVID/FLU - 24 hour TAT [[id number]] Collected: 05/16/22 1039    Lab Status:  In process Specimen: Nares from Nasopharyngeal Swab Updated: 05/16/22 1045    POCT pregnancy, urine [[id number]]  (Normal) Resulted: 05/16/22 1039    Lab Status: Final result Updated: 05/16/22 1039     EXT PREG TEST UR (Ref: Negative) negative     Control valid                 No orders to display              Procedures  Procedures         ED Course SBIRT 22yo+    Flowsheet Row Most Recent Value   SBIRT (25 yo +)    In order to provide better care to our patients, we are screening all of our patients for alcohol and drug use  Would it be okay to ask you these screening questions? No Filed at: 05/16/2022 1103                    [facility]  Number of Diagnoses or Management Options  Abdominal pain  Viral illness  Diagnosis management comments: I have reviewed the patient's visit and any testing done in the emergency department  They have verbalized their understanding of any testing done today and have no further questions or concerns regarding their care in the emergency room  They will follow up with their primary care physician as well as with any specialist in their discharge instructions  Strict return precautions were discussed  Amount and/or Complexity of Data Reviewed  Clinical lab tests: ordered and reviewed        Disposition  Final diagnoses:   Abdominal pain   Viral illness     Time reflects when diagnosis was documented in both MDM as applicable and the Disposition within this note     Time User Action Codes Description Comment    5/16/2022 10:51 AM Theresa Blowers Add [R10 9] Abdominal pain     5/16/2022 10:52 AM Theresa Blowers Add [B34 9] Viral illness       ED Disposition     ED Disposition   Discharge    Condition   Stable    Date/Time   Mon May 16, 2022 [address] discharge to home/self care                 Follow-up Information     Follow up With Specialties Details Why Contact Info Additional Information    [facility]   [address]  [address] [facility]  Tin Muñoz AZRA  49  [id number] [phone]       [facility]   [address]  [place] [address] [facility] [address]  Sonya Mehta [address] [facility] [place], [address], [address] [place] [address], [place], [place], [address] [phone] Discharge Medication List as of 5/16/2022 11:11 AM      START taking these medications    Details   ondansetron (ZOFRAN) 4 mg tablet Take 1 tablet (4 mg total) by mouth every 6 (six) hours, Starting Mon 5/16/2022, Normal         CONTINUE these medications which have NOT CHANGED    Details   albuterol (2 5 mg/3 mL) 0 083 % nebulizer solution Take 3 mL (2 5 mg total) by nebulization every 6 (six) hours as needed for wheezing or shortness of breath, Starting Tue 1/25/2022, Normal      albuterol (PROVENTIL HFA,VENTOLIN HFA) 90 mcg/act inhaler Inhale 1-2 puffs every 6 (six) hours as needed for wheezing or shortness of breath, Starting Tue 1/25/2022, Normal      Multiple Vitamin (multivitamin) tablet Take 1 tablet by mouth daily, Historical Med      triamcinolone (KENALOG) 0 1 % cream Apply topically 2 (two) times a day, Starting Tue 1/25/2022, Normal             No discharge procedures on file      PDMP Review       Value Time User    PDMP Reviewed  Yes 2/25/2022 12:45 PM Rocky Rader PA-C          ED Provider  Electronically Signed by           Connie Lockhart DO  05/16/22 9226

## 2022-05-17 LAB
FLUAV RNA RESP QL NAA+PROBE: NEGATIVE
FLUBV RNA RESP QL NAA+PROBE: NEGATIVE
SARS-COV-2 RNA RESP QL NAA+PROBE: NEGATIVE

## 2023-02-28 NOTE — ED NOTES
Patient transported to CT via radha Villaseñor RN  03/29/22 9670 Quality 110: Preventive Care And Screening: Influenza Immunization: Influenza immunization was not ordered or administered, reason not given Quality 130: Documentation Of Current Medications In The Medical Record: Current Medications Documented Detail Level: Detailed

## 2023-06-13 ENCOUNTER — OFFICE VISIT (OUTPATIENT)
Dept: FAMILY MEDICINE CLINIC | Facility: CLINIC | Age: 23
End: 2023-06-13

## 2023-06-13 VITALS
TEMPERATURE: 96.1 F | HEART RATE: 88 BPM | RESPIRATION RATE: 16 BRPM | SYSTOLIC BLOOD PRESSURE: 112 MMHG | OXYGEN SATURATION: 96 % | DIASTOLIC BLOOD PRESSURE: 62 MMHG | HEIGHT: 62 IN | BODY MASS INDEX: 27.42 KG/M2 | WEIGHT: 149 LBS

## 2023-06-13 DIAGNOSIS — L30.9 ECZEMA, UNSPECIFIED TYPE: Primary | ICD-10-CM

## 2023-06-13 DIAGNOSIS — E66.3 OVERWEIGHT: ICD-10-CM

## 2023-06-13 DIAGNOSIS — J45.20 MILD INTERMITTENT ASTHMA WITHOUT COMPLICATION: ICD-10-CM

## 2023-06-13 DIAGNOSIS — H52.10 MYOPIA, UNSPECIFIED LATERALITY: ICD-10-CM

## 2023-06-13 PROCEDURE — 99213 OFFICE O/P EST LOW 20 MIN: CPT | Performed by: PHYSICIAN ASSISTANT

## 2023-06-13 RX ORDER — ALBUTEROL SULFATE 90 UG/1
1-2 AEROSOL, METERED RESPIRATORY (INHALATION) EVERY 6 HOURS PRN
Qty: 8 G | Refills: 2 | Status: SHIPPED | OUTPATIENT
Start: 2023-06-13

## 2023-06-13 RX ORDER — ALBUTEROL SULFATE 2.5 MG/3ML
2.5 SOLUTION RESPIRATORY (INHALATION) EVERY 6 HOURS PRN
Qty: 60 ML | Refills: 1 | Status: SHIPPED | OUTPATIENT
Start: 2023-06-13

## 2023-06-13 NOTE — PROGRESS NOTES
Assessment/Plan:    Eczema  - Will prescribe triamcinolone 0 1% ointment, apply twice daily to affected area for flare ups  - Continue hydrocortisone 1% for maintenance therapy  - Advised patient to continue applying OTC lotion such as aveeno or eucerin twice daily and also immediately after showers  - Advised to take fast showers with warm water (not hot)  Advised to pat skin dry rather than rub  Advised to use [employer], [facility], or [facility] soap products or anything without scents  Mild intermittent asthma without complication  - Stable  Continue albuterol inhaler as needed  Will refill    - Will refill nebulizer solution  Diagnoses and all orders for this visit:    Eczema, unspecified type  -     triamcinolone (KENALOG) 0 1 % ointment; Apply topically 2 (two) times a day    Mild intermittent asthma without complication  -     albuterol (PROVENTIL HFA,VENTOLIN HFA) 90 mcg/act inhaler; Inhale 1-2 puffs every 6 (six) hours as needed for wheezing or shortness of breath  -     albuterol (2 5 mg/3 mL) 0 083 % nebulizer solution; Take 3 mL (2 5 mg total) by nebulization every 6 (six) hours as needed for wheezing or shortness of breath    Myopia, unspecified laterality  -     Ambulatory Referral to Ophthalmology; Future    Overweight          All of patients questions were answered  Patient understands and agrees with the above plan  Return if symptoms worsen or fail to improve  Dayron Reed PA-C  06/13/23  [address] [facility]          Subjective:     Patient ID: Jazz Calderón  is a 26 y o  female with known PMH of asthma, eczema who presents today in office for rash  - Patient is a 26 y o  female who presents today for rash    Patient presents for evaluation of a rash involving the bilateral hands and elbows  Rash started 3 weeks ago  Lesions are red, and raised in texture  Rash has not changed over time  Rash is pruritic  Associated symptoms: none   Patient denies: abdominal pain, arthralgia, congestion, cough, decrease in appetite, decrease in energy level, fever, headache, myalgia, nausea, sore throat and vomiting  Patient has not had contacts with similar rash  Patient has not had new exposures (soaps, lotions, laundry detergents, foods, medications, plants, insects or animals)  The following portions of the patient's history were reviewed and updated as appropriate: allergies, current medications, past family history, past medical history, past social history, past surgical history and problem list         Review of Systems   Constitutional: Negative for appetite change, chills and fever  HENT: Negative for congestion and sore throat  Eyes: Negative for pain  Respiratory: Negative for cough, chest tightness and shortness of breath  Cardiovascular: Negative for chest pain and palpitations  Gastrointestinal: Negative for abdominal pain, constipation, diarrhea, nausea and vomiting  Genitourinary: Negative for difficulty urinating and dysuria  Musculoskeletal: Negative for myalgias  Skin: Positive for rash  Neurological: Negative for dizziness and headaches  BMI Counseling: Body mass index is 27 25 kg/m²  The BMI is above normal  Nutrition recommendations include decreasing portion sizes, encouraging healthy choices of fruits and vegetables, consuming healthier snacks, limiting drinks that contain sugar, moderation in carbohydrate intake, increasing intake of lean protein and reducing intake of cholesterol  Exercise recommendations include moderate physical activity 150 minutes/week  No pharmacotherapy was ordered  Rationale for BMI follow-up plan is due to patient being overweight or obese  Tobacco Cessation Counseling: Tobacco cessation counseling was provided  The patient is sincerely urged to quit consumption of tobacco  She is not ready to quit tobacco  Medication options and side effects of medication discussed  Patient refused medication             Objective: "  Vitals:    06/13/23 1536   BP: 112/62   BP Location: Left arm   Patient Position: Sitting   Cuff Size: Standard   Pulse: 88   Resp: 16   Temp: (!) 96 1 °F (35 6 °C)   TempSrc: Temporal   SpO2: 96%   Weight: 67 6 kg (149 lb)   Height: 5' 2\" (1 575 m)         Physical Exam  Vitals and nursing note reviewed  Constitutional:       General: She is not in acute distress  Appearance: She is well-developed  HENT:      Head: Normocephalic and atraumatic  Right Ear: External ear normal       Left Ear: External ear normal       Nose: Nose normal    Eyes:      Conjunctiva/sclera: Conjunctivae normal    Cardiovascular:      Rate and Rhythm: Normal rate and regular rhythm  Pulses: Normal pulses  Heart sounds: Normal heart sounds  Pulmonary:      Effort: Pulmonary effort is normal  No respiratory distress  Breath sounds: Normal breath sounds  No wheezing  Musculoskeletal:      Cervical back: Normal range of motion and neck supple  Skin:     General: Skin is warm and dry  Findings: Rash (Ezematous rash located on bilateral hands and elbows ) present  Neurological:      Mental Status: She is alert and oriented to person, place, and time     Psychiatric:         Behavior: Behavior normal            "

## 2023-06-15 ENCOUNTER — TELEPHONE (OUTPATIENT)
Dept: ADMINISTRATIVE | Facility: OTHER | Age: 23
End: 2023-06-15

## 2023-06-15 NOTE — TELEPHONE ENCOUNTER
----- Message from Prince Islas PA-C sent at 6/15/2023  8:41 AM EDT -----  Regarding: HIV Screening  06/15/23 8:42 AM    Hello, our patient Rasta Cast has had HIV completed/performed  Please assist in updating the patient chart by pulling the Care Everywhere (CE) document  The date of service is 2/17/22       Thank you,  MARSHAL Morrow

## 2023-06-15 NOTE — ASSESSMENT & PLAN NOTE
- Will prescribe triamcinolone 0 1% ointment, apply twice daily to affected area for flare ups  - Continue hydrocortisone 1% for maintenance therapy  - Advised patient to continue applying OTC lotion such as aveeno or eucerin twice daily and also immediately after showers  - Advised to take fast showers with warm water (not hot)  Advised to pat skin dry rather than rub  Advised to use [place], [facility], or [employer] soap products or anything without scents

## 2023-06-15 NOTE — TELEPHONE ENCOUNTER
Upon review of the In Basket request we were able to locate, review, and update the patient chart as requested for HIV  Any additional questions or concerns should be emailed to the Practice Liaisons via the appropriate education email address, please do not reply via In Basket      Thank you  Jean Long

## 2023-08-07 DIAGNOSIS — J45.20 MILD INTERMITTENT ASTHMA WITHOUT COMPLICATION: ICD-10-CM

## 2023-08-11 RX ORDER — ALBUTEROL SULFATE 90 UG/1
AEROSOL, METERED RESPIRATORY (INHALATION)
Qty: 6.7 G | Refills: 1 | Status: SHIPPED | OUTPATIENT
Start: 2023-08-11

## 2023-09-26 DIAGNOSIS — J45.20 MILD INTERMITTENT ASTHMA WITHOUT COMPLICATION: ICD-10-CM

## 2023-09-28 RX ORDER — ALBUTEROL SULFATE 90 UG/1
AEROSOL, METERED RESPIRATORY (INHALATION)
Qty: 6.7 G | Refills: 1 | Status: SHIPPED | OUTPATIENT
Start: 2023-09-28

## 2023-10-02 ENCOUNTER — HOSPITAL ENCOUNTER (EMERGENCY)
Facility: HOSPITAL | Age: 23
Discharge: HOME | End: 2023-10-03
Attending: EMERGENCY MEDICINE
Payer: COMMERCIAL

## 2023-10-02 ENCOUNTER — APPOINTMENT (EMERGENCY)
Dept: RADIOLOGY | Facility: HOSPITAL | Age: 23
End: 2023-10-02
Payer: COMMERCIAL

## 2023-10-02 DIAGNOSIS — J45.909 ASTHMA: ICD-10-CM

## 2023-10-02 DIAGNOSIS — R07.9 CHEST PAIN: Primary | ICD-10-CM

## 2023-10-02 LAB
EXT PREGNANCY TEST URINE: NEGATIVE
EXT. CONTROL: NORMAL

## 2023-10-02 PROCEDURE — 94640 AIRWAY INHALATION TREATMENT: CPT

## 2023-10-02 PROCEDURE — 99285 EMERGENCY DEPT VISIT HI MDM: CPT | Performed by: EMERGENCY MEDICINE

## 2023-10-02 PROCEDURE — 99285 EMERGENCY DEPT VISIT HI MDM: CPT

## 2023-10-02 PROCEDURE — 93005 ELECTROCARDIOGRAM TRACING: CPT

## 2023-10-02 PROCEDURE — 81025 URINE PREGNANCY TEST: CPT | Performed by: EMERGENCY MEDICINE

## 2023-10-02 PROCEDURE — 71046 X-RAY EXAM CHEST 2 VIEWS: CPT

## 2023-10-02 RX ORDER — IBUPROFEN 600 MG/1
600 TABLET ORAL ONCE
Status: COMPLETED | OUTPATIENT
Start: 2023-10-02 | End: 2023-10-02

## 2023-10-02 RX ORDER — IPRATROPIUM BROMIDE AND ALBUTEROL SULFATE 2.5; .5 MG/3ML; MG/3ML
3 SOLUTION RESPIRATORY (INHALATION) ONCE
Status: COMPLETED | OUTPATIENT
Start: 2023-10-02 | End: 2023-10-02

## 2023-10-02 RX ADMIN — IPRATROPIUM BROMIDE AND ALBUTEROL SULFATE 3 ML: 2.5; .5 SOLUTION RESPIRATORY (INHALATION) at 22:54

## 2023-10-02 RX ADMIN — IBUPROFEN 600 MG: 600 TABLET ORAL at 23:47

## 2023-10-02 RX ADMIN — IPRATROPIUM BROMIDE AND ALBUTEROL SULFATE 3 ML: 2.5; .5 SOLUTION RESPIRATORY (INHALATION) at 23:47

## 2023-10-03 VITALS
RESPIRATION RATE: 14 BRPM | BODY MASS INDEX: 24.87 KG/M2 | WEIGHT: 136 LBS | OXYGEN SATURATION: 96 % | HEART RATE: 82 BPM | DIASTOLIC BLOOD PRESSURE: 78 MMHG | SYSTOLIC BLOOD PRESSURE: 124 MMHG

## 2023-10-03 LAB
ATRIAL RATE: 88 BPM
P AXIS: 62 DEGREES
PR INTERVAL: 132 MS
QRS AXIS: 46 DEGREES
QRSD INTERVAL: 80 MS
QT INTERVAL: 340 MS
QTC INTERVAL: 411 MS
T WAVE AXIS: 40 DEGREES
VENTRICULAR RATE: 88 BPM

## 2023-10-03 PROCEDURE — 93010 ELECTROCARDIOGRAM REPORT: CPT | Performed by: INTERNAL MEDICINE

## 2023-10-03 NOTE — ED PROVIDER NOTES
History  Chief Complaint   Patient presents with   • Chest Pain     Pt having pressure chest pain for 1 hour. No medications pta. No sob. 64-year-old female presents with complaint of chest discomfort and shortness of breath. She has a history of asthma and states that she tried her inhaler with minimal improvement of symptoms. She has had some cold-like symptoms but denies any fever, nausea/vomiting. Chest Pain  Chest pain location: Generalized. Pain quality: tightness    Pain radiates to:  Does not radiate  Pain severity:  Mild  Onset quality:  Gradual  Duration:  1 hour  Timing:  Constant  Progression:  Unchanged  Relieved by:  Nothing  Worsened by:  Nothing tried  Ineffective treatments: Inhaler. Associated symptoms: cough and shortness of breath    Associated symptoms: no altered mental status, no fever, no nausea, no palpitations and not vomiting        Prior to Admission Medications   Prescriptions Last Dose Informant Patient Reported? Taking?    Multiple Vitamin (multivitamin) tablet  Self Yes No   Sig: Take 1 tablet by mouth daily   albuterol (2.5 mg/3 mL) 0.083 % nebulizer solution   No No   Sig: Take 3 mL (2.5 mg total) by nebulization every 6 (six) hours as needed for wheezing or shortness of breath   albuterol (PROVENTIL HFA,VENTOLIN HFA) 90 mcg/act inhaler   No No   Sig: INH 1 TO 2 PUFFS EVERY SIX HOURS AS NEEDED FOR WHEEZING OR SHORTNESS OF BREATH   ondansetron (ZOFRAN) 4 mg tablet   No No   Sig: Take 1 tablet (4 mg total) by mouth every 6 (six) hours   triamcinolone (KENALOG) 0.1 % ointment   No No   Sig: Apply topically 2 (two) times a day      Facility-Administered Medications: None       Past Medical History:   Diagnosis Date   • Asthma    • Endometriosis    • GERD (gastroesophageal reflux disease)        Past Surgical History:   Procedure Laterality Date   • LAPAROSCOPY      for endometriosis   • LA EXC B9 LESION MRGN XCP SK TG F/E/E/N/L/M > 4.0CM N/A 2/25/2022    Procedure: EXCISION OF FOREHEAD SUBCUTANEOUS LESION WITH COMPLEX CLOSURE;  Surgeon: Amanuel Taylor MD;  Location: [facility];  Service: Plastics       Family History   Problem Relation Age of Onset   • Anxiety disorder Mother    • Depression Mother    • Asthma Mother    • Asthma Brother      I have reviewed and agree with the history as documented. E-Cigarette/Vaping   • E-Cigarette Use Never User      E-Cigarette/Vaping Substances     Social History     Tobacco Use   • Smoking status: Some Days   • Smokeless tobacco: Never   • Tobacco comments:     hookah soically    Vaping Use   • Vaping Use: Never used   Substance Use Topics   • Alcohol use: Yes     Comment: socially   • Drug use: Never       Review of Systems   Constitutional: Negative for fever. HENT: Positive for congestion and postnasal drip. Respiratory: Positive for cough and shortness of breath. Cardiovascular: Positive for chest pain. Negative for palpitations. Gastrointestinal: Negative for nausea and vomiting. All other systems reviewed and are negative. Physical Exam  Physical Exam  Vitals and nursing note reviewed. Constitutional:       General: She is not in acute distress. Appearance: Normal appearance. She is well-developed. She is not ill-appearing or toxic-appearing. HENT:      Head: Normocephalic and atraumatic. Right Ear: External ear normal.      Left Ear: External ear normal.      Nose: Nose normal. No congestion. Mouth/Throat:      Mouth: Mucous membranes are moist.      Pharynx: Oropharynx is clear. Eyes:      Conjunctiva/sclera: Conjunctivae normal.      Pupils: Pupils are equal, round, and reactive to light. Cardiovascular:      Rate and Rhythm: Normal rate and regular rhythm. Heart sounds: Normal heart sounds. Pulmonary:      Effort: Pulmonary effort is normal. No respiratory distress. Breath sounds: Normal breath sounds. No wheezing.    Abdominal:      General: Bowel sounds are normal.      Palpations: Abdomen is soft. Tenderness: There is no abdominal tenderness. There is no guarding. Musculoskeletal:         General: No tenderness or deformity. Cervical back: Normal range of motion and neck supple. No rigidity. Skin:     General: Skin is warm and dry. Capillary Refill: Capillary refill takes less than 2 seconds. Findings: No rash. Neurological:      General: No focal deficit present. Mental Status: She is alert and oriented to person, place, and time. Psychiatric:         Mood and Affect: Mood normal.         Behavior: Behavior normal.         Vital Signs  ED Triage Vitals   Temp Pulse Respirations Blood Pressure SpO2   -- 10/02/23 2244 10/02/23 2244 10/02/23 2244 10/02/23 2244    97 18 142/79 100 %      Temp src Heart Rate Source Patient Position - Orthostatic VS BP Location FiO2 (%)   -- 10/02/23 2244 10/02/23 2244 10/02/23 2244 --    Monitor Sitting Left arm       Pain Score       10/02/23 2347       8           Vitals:    10/02/23 2244   BP: 142/79   Pulse: 97   Patient Position - Orthostatic VS: Sitting         Visual Acuity      ED Medications  Medications   ipratropium-albuterol (DUO-NEB) 0.5-2.5 mg/3 mL inhalation solution 3 mL (3 mL Nebulization Given 10/2/23 2254)   ipratropium-albuterol (DUO-NEB) 0.5-2.5 mg/3 mL inhalation solution 3 mL (3 mL Nebulization Given 10/2/23 2347)   ibuprofen (MOTRIN) tablet 600 mg (600 mg Oral Given 10/2/23 2347)       Diagnostic Studies  Results Reviewed     Procedure Component Value Units Date/Time    POCT pregnancy, urine [[id number]]  (Normal) Resulted: 10/02/23 2324    Lab Status: Final result Updated: 10/02/23 2325     EXT Preg Test, Ur Negative     Control Valid                 XR chest 2 views   ED Interpretation by Zoë Mendez DO (10/02 2336)   No acute findings                 Procedures  ECG 12 Lead Documentation Only    Date/Time: 10/2/2023 10:49 PM    Performed by: Zoë Mendez DO  Authorized by:  Zoë Mendez DO    ECG reviewed by me, the ED Provider: yes    Patient location:  ED  Rate:     ECG rate:  88    ECG rate assessment: normal    Rhythm:     Rhythm: sinus rhythm    Ectopy:     Ectopy: none    QRS:     QRS axis:  Normal  Conduction:     Conduction: normal    ST segments:     ST segments:  Normal  T waves:     T waves: non-specific               ED Course  ED Course as of 10/03/23 0005   Mon Oct 02, 2023   2337 Chest x-ray is unremarkable. Patient reports some improvement after the initial DuoNeb. We will give her a repeat treatment and reassess. SBIRT 20yo+    Flowsheet Row Most Recent Value   Initial Alcohol Screen: US AUDIT-C     1. How often do you have a drink containing alcohol? 0 Filed at: 10/02/2023 2244   2. How many drinks containing alcohol do you have on a typical day you are drinking? 0 Filed at: 10/02/2023 2244   3b. FEMALE Any Age, or MALE 65+: How often do you have 4 or more drinks on one occassion? 0 Filed at: 10/02/2023 2244   Audit-C Score 0 Filed at: 10/02/2023 2244   KARYNA: How many times in the past year have you. .. Used an illegal drug or used a prescription medication for non-medical reasons? Never Filed at: 10/02/2023 2244                    Medical Decision Making  60-year-old female presents with complaint of chest discomfort and shortness of breath. She has a history of asthma with somewhat similar symptoms. After receiving 2 neb treatments and ibuprofen, her symptoms resolved. EKG and chest x-ray unremarkable. Patient was provided with a spacer for use with her inhaler. She will be following up with her primary care provider and is aware of reasons to return to the ER. Doubt pneumonia, ACS, or other significant issues based on history, exam findings, and work-up results. Amount and/or Complexity of Data Reviewed  Labs: ordered. Radiology: ordered and independent interpretation performed.       Risk  Prescription drug management. Disposition  Final diagnoses:   Chest pain   Asthma     Time reflects when diagnosis was documented in both MDM as applicable and the Disposition within this note     Time User Action Codes Description Comment    10/3/2023 12:04 AM Daisy Holloway Add [R07.9] Chest pain     10/3/2023 12:04 AM Daisy Holloway Add [S47.595] Asthma       ED Disposition     ED Disposition   Discharge    Condition   Stable    Date/Time   Tue Oct 3, 2023 12:04 AM    Comment   Kylah Goff discharge to home/self care. Follow-up Information     Follow up With Specialties Details Why [address] , [address]  [phone]            Patient's Medications   Discharge Prescriptions    No medications on file       No discharge procedures on file.     PDMP Review       Value Time User    PDMP Reviewed  Yes 2/25/2022 12:45 PM Amandeep Aguayo PA-C          ED Provider  Electronically Signed by           Kate Suggs DO  10/03/23 0005

## 2023-11-02 ENCOUNTER — HOSPITAL ENCOUNTER (EMERGENCY)
Facility: HOSPITAL | Age: 23
Discharge: HOME | End: 2023-11-02
Attending: EMERGENCY MEDICINE
Payer: COMMERCIAL

## 2023-11-02 ENCOUNTER — APPOINTMENT (EMERGENCY)
Dept: RADIOLOGY | Facility: HOSPITAL | Age: 23
End: 2023-11-02
Payer: COMMERCIAL

## 2023-11-02 VITALS
DIASTOLIC BLOOD PRESSURE: 87 MMHG | SYSTOLIC BLOOD PRESSURE: 162 MMHG | HEART RATE: 86 BPM | OXYGEN SATURATION: 98 % | WEIGHT: 140.8 LBS | RESPIRATION RATE: 18 BRPM | TEMPERATURE: 98.6 F | BODY MASS INDEX: 25.75 KG/M2

## 2023-11-02 DIAGNOSIS — K59.00 CONSTIPATION: Primary | ICD-10-CM

## 2023-11-02 PROCEDURE — 99283 EMERGENCY DEPT VISIT LOW MDM: CPT

## 2023-11-02 PROCEDURE — 74022 RADEX COMPL AQT ABD SERIES: CPT

## 2023-11-02 PROCEDURE — 99284 EMERGENCY DEPT VISIT MOD MDM: CPT

## 2023-11-02 RX ORDER — POLYETHYLENE GLYCOL 3350 17 G/17G
17 POWDER, FOR SOLUTION ORAL DAILY
Qty: 170 G | Refills: 0 | Status: SHIPPED | OUTPATIENT
Start: 2023-11-02 | End: 2023-11-12

## 2023-11-02 RX ORDER — POLYETHYLENE GLYCOL 3350 17 G/17G
17 POWDER, FOR SOLUTION ORAL ONCE
Status: COMPLETED | OUTPATIENT
Start: 2023-11-02 | End: 2023-11-02

## 2023-11-02 RX ADMIN — POLYETHYLENE GLYCOL 3350 17 G: 17 POWDER, FOR SOLUTION ORAL at 19:54

## 2023-11-02 NOTE — Clinical Note
Isrrael Mcwilliams was seen and treated in our emergency department on 11/2/2023. No restrictions            Diagnosis:     Antonietta Gutierrez  may return to work on return date, is off the rest of the shift today. She may return on this date: 11/03/2023         If you have any questions or concerns, please don't hesitate to call.       Nely Agee PA-C    ______________________________           _______________          _______________  Hospital Representative                              Date                                Time

## 2023-11-03 ENCOUNTER — HOSPITAL ENCOUNTER (EMERGENCY)
Facility: HOSPITAL | Age: 23
Discharge: HOME | End: 2023-11-03
Attending: EMERGENCY MEDICINE
Payer: COMMERCIAL

## 2023-11-03 ENCOUNTER — APPOINTMENT (EMERGENCY)
Dept: CT IMAGING | Facility: HOSPITAL | Age: 23
End: 2023-11-03
Payer: COMMERCIAL

## 2023-11-03 VITALS
WEIGHT: 140.1 LBS | HEART RATE: 75 BPM | TEMPERATURE: 97.8 F | DIASTOLIC BLOOD PRESSURE: 80 MMHG | SYSTOLIC BLOOD PRESSURE: 126 MMHG | RESPIRATION RATE: 18 BRPM | OXYGEN SATURATION: 100 % | BODY MASS INDEX: 25.62 KG/M2

## 2023-11-03 DIAGNOSIS — R10.9 ABDOMINAL PAIN, UNSPECIFIED ABDOMINAL LOCATION: ICD-10-CM

## 2023-11-03 DIAGNOSIS — K59.00 CONSTIPATION, UNSPECIFIED CONSTIPATION TYPE: Primary | ICD-10-CM

## 2023-11-03 LAB
ALBUMIN SERPL BCP-MCNC: 4.6 G/DL (ref 3.5–5)
ALP SERPL-CCNC: 65 U/L (ref 34–104)
ALT SERPL W P-5'-P-CCNC: 14 U/L (ref 7–52)
ANION GAP SERPL CALCULATED.3IONS-SCNC: 7 MMOL/L
AST SERPL W P-5'-P-CCNC: 19 U/L (ref 13–39)
BASOPHILS # BLD AUTO: 0.05 THOUSANDS/ÂΜL (ref 0–0.1)
BASOPHILS NFR BLD AUTO: 1 % (ref 0–1)
BILIRUB SERPL-MCNC: 0.89 MG/DL (ref 0.2–1)
BUN SERPL-MCNC: 8 MG/DL (ref 5–25)
CALCIUM SERPL-MCNC: 9.5 MG/DL (ref 8.4–10.2)
CHLORIDE SERPL-SCNC: 105 MMOL/L (ref 96–108)
CO2 SERPL-SCNC: 25 MMOL/L (ref 21–32)
CREAT SERPL-MCNC: 0.64 MG/DL (ref 0.6–1.3)
EOSINOPHIL # BLD AUTO: 0.28 THOUSAND/ÂΜL (ref 0–0.61)
EOSINOPHIL NFR BLD AUTO: 4 % (ref 0–6)
ERYTHROCYTE [DISTWIDTH] IN BLOOD BY AUTOMATED COUNT: 12.3 % (ref 11.6–15.1)
EXT PREGNANCY TEST URINE: NEGATIVE
EXT. CONTROL: NORMAL
GFR SERPL CREATININE-BSD FRML MDRD: 127 ML/MIN/1.73SQ M
GLUCOSE SERPL-MCNC: 85 MG/DL (ref 65–140)
HCT VFR BLD AUTO: 44.3 % (ref 34.8–46.1)
HGB BLD-MCNC: 14.6 G/DL (ref 11.5–15.4)
IMM GRANULOCYTES # BLD AUTO: 0.01 THOUSAND/UL (ref 0–0.2)
IMM GRANULOCYTES NFR BLD AUTO: 0 % (ref 0–2)
LIPASE SERPL-CCNC: 35 U/L (ref 11–82)
LYMPHOCYTES # BLD AUTO: 1.94 THOUSANDS/ÂΜL (ref 0.6–4.47)
LYMPHOCYTES NFR BLD AUTO: 27 % (ref 14–44)
MCH RBC QN AUTO: 30.6 PG (ref 26.8–34.3)
MCHC RBC AUTO-ENTMCNC: 33 G/DL (ref 31.4–37.4)
MCV RBC AUTO: 93 FL (ref 82–98)
MONOCYTES # BLD AUTO: 0.62 THOUSAND/ÂΜL (ref 0.17–1.22)
MONOCYTES NFR BLD AUTO: 9 % (ref 4–12)
NEUTROPHILS # BLD AUTO: 4.19 THOUSANDS/ÂΜL (ref 1.85–7.62)
NEUTS SEG NFR BLD AUTO: 59 % (ref 43–75)
NRBC BLD AUTO-RTO: 0 /100 WBCS
PLATELET # BLD AUTO: 246 THOUSANDS/UL (ref 149–390)
PMV BLD AUTO: 11.5 FL (ref 8.9–12.7)
POTASSIUM SERPL-SCNC: 4 MMOL/L (ref 3.5–5.3)
PROT SERPL-MCNC: 7.3 G/DL (ref 6.4–8.4)
RBC # BLD AUTO: 4.77 MILLION/UL (ref 3.81–5.12)
SODIUM SERPL-SCNC: 137 MMOL/L (ref 135–147)
WBC # BLD AUTO: 7.09 THOUSAND/UL (ref 4.31–10.16)

## 2023-11-03 PROCEDURE — 81025 URINE PREGNANCY TEST: CPT | Performed by: EMERGENCY MEDICINE

## 2023-11-03 PROCEDURE — 83690 ASSAY OF LIPASE: CPT | Performed by: EMERGENCY MEDICINE

## 2023-11-03 PROCEDURE — 96360 HYDRATION IV INFUSION INIT: CPT

## 2023-11-03 PROCEDURE — 85025 COMPLETE CBC W/AUTO DIFF WBC: CPT | Performed by: EMERGENCY MEDICINE

## 2023-11-03 PROCEDURE — 80053 COMPREHEN METABOLIC PANEL: CPT | Performed by: EMERGENCY MEDICINE

## 2023-11-03 PROCEDURE — G1004 CDSM NDSC: HCPCS

## 2023-11-03 PROCEDURE — 36415 COLL VENOUS BLD VENIPUNCTURE: CPT | Performed by: EMERGENCY MEDICINE

## 2023-11-03 PROCEDURE — 74177 CT ABD & PELVIS W/CONTRAST: CPT

## 2023-11-03 PROCEDURE — 99285 EMERGENCY DEPT VISIT HI MDM: CPT | Performed by: EMERGENCY MEDICINE

## 2023-11-03 RX ORDER — POLYETHYLENE GLYCOL 3350 17 G/17G
17 POWDER, FOR SOLUTION ORAL 2 TIMES DAILY PRN
Qty: 14 EACH | Refills: 0 | Status: SHIPPED | OUTPATIENT
Start: 2023-11-03

## 2023-11-03 RX ADMIN — SODIUM CHLORIDE 1000 ML: 0.9 INJECTION, SOLUTION INTRAVENOUS at 16:04

## 2023-11-03 RX ADMIN — IOHEXOL 80 ML: 350 INJECTION, SOLUTION INTRAVENOUS at 18:01

## 2023-11-03 NOTE — ED PROVIDER NOTES
History  Chief Complaint   Patient presents with    Constipation     Patient seen yesterday for same; taking miralax with no relief     The patient reports no bowel movements for the past 2 weeks. She has  been getting crampy abdominal pain with this, and rectal pain particularly when she sits on the toilet. She was seen for the same symptoms yesterday and treated with miralax, but she has not had any response to the medication. She notes 2 prior surgeries for endometriosis. She denies urinary difficulty including pain, increased frequency, and urgency. No fevers/chills. Constipation      Prior to Admission Medications   Prescriptions Last Dose Informant Patient Reported? Taking?    Multiple Vitamin (multivitamin) tablet  Self Yes No   Sig: Take 1 tablet by mouth daily   albuterol (2.5 mg/3 mL) 0.083 % nebulizer solution   No No   Sig: Take 3 mL (2.5 mg total) by nebulization every 6 (six) hours as needed for wheezing or shortness of breath   albuterol (PROVENTIL HFA,VENTOLIN HFA) 90 mcg/act inhaler   No No   Sig: INH 1 TO 2 PUFFS EVERY SIX HOURS AS NEEDED FOR WHEEZING OR SHORTNESS OF BREATH   ondansetron (ZOFRAN) 4 mg tablet   No No   Sig: Take 1 tablet (4 mg total) by mouth every 6 (six) hours   polyethylene glycol (MIRALAX) 17 g packet   No No   Sig: Take 17 g by mouth daily for 10 days   triamcinolone (KENALOG) 0.1 % ointment   No No   Sig: Apply topically 2 (two) times a day      Facility-Administered Medications: None       Past Medical History:   Diagnosis Date    Asthma     Endometriosis     GERD (gastroesophageal reflux disease)        Past Surgical History:   Procedure Laterality Date    LAPAROSCOPY      for endometriosis    TN EXC B9 LESION MRGN XCP SK TG F/E/E/N/L/M > 4.0CM N/A 2/25/2022    Procedure: EXCISION OF FOREHEAD SUBCUTANEOUS LESION WITH COMPLEX CLOSURE;  Surgeon: Adonis Singleton MD;  Location: [facility];  Service: Plastics       Family History   Problem Relation Age of Onset Anxiety disorder Mother     Depression Mother     Asthma Mother     Asthma Brother      I have reviewed and agree with the history as documented. E-Cigarette/Vaping    E-Cigarette Use Never User      E-Cigarette/Vaping Substances     Social History     Tobacco Use    Smoking status: Some Days    Smokeless tobacco: Never    Tobacco comments:     hookah soically    Vaping Use    Vaping Use: Never used   Substance Use Topics    Alcohol use: Yes     Comment: socially    Drug use: Never       Review of Systems   Gastrointestinal:  Positive for constipation. All other systems reviewed and are negative. Physical Exam  Physical Exam  Vitals and nursing note reviewed. Constitutional:       General: She is not in acute distress. Appearance: She is well-developed. HENT:      Head: Normocephalic and atraumatic. Eyes:      Conjunctiva/sclera: Conjunctivae normal.   Cardiovascular:      Rate and Rhythm: Normal rate and regular rhythm. Heart sounds: No murmur heard. Pulmonary:      Effort: Pulmonary effort is normal. No respiratory distress. Breath sounds: Normal breath sounds. Abdominal:      Palpations: Abdomen is soft. Tenderness: There is abdominal tenderness (Mild generalized without focal area of tenderness. ). There is no guarding or rebound. Hernia: No hernia is present. Musculoskeletal:         General: No swelling. Cervical back: Neck supple. Skin:     General: Skin is warm and dry. Capillary Refill: Capillary refill takes less than 2 seconds. Neurological:      Mental Status: She is alert.    Psychiatric:         Mood and Affect: Mood normal.         Vital Signs  ED Triage Vitals [11/03/23 1548]   Temperature Pulse Respirations Blood Pressure SpO2   97.8 °F (36.6 °C) 75 18 126/80 100 %      Temp src Heart Rate Source Patient Position - Orthostatic VS BP Location FiO2 (%)   -- -- -- -- --      Pain Score       --           Vitals:    11/03/23 1548   BP: 126/80 Pulse: 75         Visual Acuity      ED Medications  Medications   sodium chloride 0.9 % bolus 1,000 mL (0 mL Intravenous Stopped 11/3/23 1734)   iohexol (OMNIPAQUE) 350 MG/ML injection (MULTI-DOSE) 80 mL (80 mL Intravenous Given 11/3/23 1801)       Diagnostic Studies  Results Reviewed       Procedure Component Value Units Date/Time    POCT pregnancy, urine [[id number]]  (Normal) Resulted: 11/03/23 1737    Lab Status: Final result Updated: 11/03/23 1737     EXT Preg Test, Ur Negative     Control Valid    Comprehensive metabolic panel [[id number]] Collected: 11/03/23 1602    Lab Status: Final result Specimen: Blood from Arm, Left Updated: 11/03/23 1643     Sodium 137 mmol/L      Potassium 4.0 mmol/L      Chloride 105 mmol/L      CO2 25 mmol/L      ANION GAP 7 mmol/L      BUN 8 mg/dL      Creatinine 0.64 mg/dL      Glucose 85 mg/dL      Calcium 9.5 mg/dL      AST 19 U/L      ALT 14 U/L      Alkaline Phosphatase 65 U/L      Total Protein 7.3 g/dL      Albumin 4.6 g/dL      Total Bilirubin 0.89 mg/dL      eGFR 127 ml/min/1.73sq m     Narrative:      [place] guidelines for Chronic Kidney Disease (CKD):     Stage 1 with normal or high GFR (GFR > 90 mL/min/1.73 square meters)    Stage 2 Mild CKD (GFR = 60-89 mL/min/1.73 square meters)    Stage 3A Moderate CKD (GFR = 45-59 mL/min/1.73 square meters)    Stage 3B Moderate CKD (GFR = 30-44 mL/min/1.73 square meters)    Stage 4 Severe CKD (GFR = 15-29 mL/min/1.73 square meters)    Stage 5 End Stage CKD (GFR <15 mL/min/1.73 square meters)  Note: GFR calculation is accurate only with a steady state creatinine    Lipase [[id number]]  (Normal) Collected: 11/03/23 1602    Lab Status: Final result Specimen: Blood from Arm, Left Updated: 11/03/23 1643     Lipase 35 u/L     CBC and differential [[id number]] Collected: 11/03/23 1602    Lab Status: Final result Specimen: Blood from Arm, Left Updated: 11/03/23 1618     WBC 7.09 Thousand/uL      RBC 4.77 Million/uL Hemoglobin 14.6 g/dL      Hematocrit 44.3 %      MCV 93 fL      MCH 30.6 pg      MCHC 33.0 g/dL      RDW 12.3 %      MPV 11.5 fL      Platelets 453 Thousands/uL      nRBC 0 /100 WBCs      Neutrophils Relative 59 %      Immat GRANS % 0 %      Lymphocytes Relative 27 %      Monocytes Relative 9 %      Eosinophils Relative 4 %      Basophils Relative 1 %      Neutrophils Absolute 4.19 Thousands/µL      Immature Grans Absolute 0.01 Thousand/uL      Lymphocytes Absolute 1.94 Thousands/µL      Monocytes Absolute 0.62 Thousand/µL      Eosinophils Absolute 0.28 Thousand/µL      Basophils Absolute 0.05 Thousands/µL                    CT abdomen pelvis with contrast   Final Result by Simone Faith MD (11/03 1838)      No acute CT findings. No evidence of bowel obstruction. Workstation performed: [id number]                    Procedures  Procedures         ED Course  ED Course as of 11/03/23 2014 Fri Nov 03, 2023 2014 On reassessment prior to discharge, repeat abdominal exam nonacute. I discussed CT findings, management of constipation as patient still feels like she has a bowel movement that she cannot pass. Patient prefers to try home enema. I discussed follow-up and indications to return to the emergency department. SBIRT 22yo+      Flowsheet Row Most Recent Value   Initial Alcohol Screen: US AUDIT-C     1. How often do you have a drink containing alcohol? 0 Filed at: 11/03/2023 1548   2. How many drinks containing alcohol do you have on a typical day you are drinking? 0 Filed at: 11/03/2023 1548   3a. Male UNDER 65: How often do you have five or more drinks on one occasion? 0 Filed at: 11/03/2023 1548   3b. FEMALE Any Age, or MALE 65+: How often do you have 4 or more drinks on one occassion? 0 Filed at: 11/03/2023 1548   Audit-C Score 0 Filed at: 11/03/2023 1548   KARYNA: How many times in the past year have you. ..     Used an illegal drug or used a prescription medication for non-medical reasons? Never Filed at: 11/03/2023 1548                      Medical Decision Making  Ddx includes constipation, ileus, SBO, adhesions. Will obtain labs and CT at this point due to failure to respond to conservative treatment. Amount and/or Complexity of Data Reviewed  Labs: ordered. Radiology: ordered. Risk  OTC drugs. Prescription drug management. Disposition  Final diagnoses:   Constipation, unspecified constipation type   Abdominal pain, unspecified abdominal location     Time reflects when diagnosis was documented in both MDM as applicable and the Disposition within this note       Time User Action Codes Description Comment    11/3/2023  6:49 PM Pau Caro Add [K59.00] Constipation, unspecified constipation type     11/3/2023  6:49 PM Pau Caro Add [R10.9] Abdominal pain, unspecified abdominal location           ED Disposition       ED Disposition   Discharge    Condition   Stable    Date/Time   Fri Nov 3, 2023 [facility] discharge to home/self care. Follow-up Information       Follow up With Specialties Details Why [address] , [address] [place] [address]  [phone]              Discharge Medication List as of 11/3/2023  6:50 PM        START taking these medications    Details   bisacodyl (FLEET) 10 MG/30ML ENEM Insert 30 mL (10 mg total) into the rectum once for 1 dose, Starting Fri 11/3/2023, Normal      !! polyethylene glycol (MIRALAX) 17 g packet Take 17 g by mouth 2 (two) times a day as needed (constipation), Starting Fri 11/3/2023, Normal       !! - Potential duplicate medications found. Please discuss with provider.         CONTINUE these medications which have NOT CHANGED    Details   albuterol (2.5 mg/3 mL) 0.083 % nebulizer solution Take 3 mL (2.5 mg total) by nebulization every 6 (six) hours as needed for wheezing or shortness of breath, Starting Tue 6/13/2023, Normal      albuterol (PROVENTIL HFA,VENTOLIN HFA) 90 mcg/act inhaler INH 1 TO 2 PUFFS EVERY SIX HOURS AS NEEDED FOR WHEEZING OR SHORTNESS OF BREATH, Normal      Multiple Vitamin (multivitamin) tablet Take 1 tablet by mouth daily, Historical Med      ondansetron (ZOFRAN) 4 mg tablet Take 1 tablet (4 mg total) by mouth every 6 (six) hours, Starting Mon 5/16/2022, Normal      !! polyethylene glycol (MIRALAX) 17 g packet Take 17 g by mouth daily for 10 days, Starting Thu 11/2/2023, Until Sun 11/12/2023, Normal      triamcinolone (KENALOG) 0.1 % ointment Apply topically 2 (two) times a day, Starting Tue 6/13/2023, Normal       !! - Potential duplicate medications found. Please discuss with provider. No discharge procedures on file.     PDMP Review         Value Time User    PDMP Reviewed  Yes 2/25/2022 12:45 PM Amandeep Aguayo PA-C            ED Provider  Electronically Signed by             Miriam Rodgers MD  11/03/23 2014

## 2023-11-03 NOTE — Clinical Note
Kodi Harrell was seen and treated in our emergency department on 11/3/2023. Diagnosis:     Kylah  . She may return on this date:     Please excuse from work on 11/3/2023     If you have any questions or concerns, please don't hesitate to call.       Estefania Guzman MD    ______________________________           _______________          _______________  Hospital Representative                              Date                                Time

## 2023-11-05 NOTE — ED PROVIDER NOTES
History  Chief Complaint   Patient presents with    Constipation     Pt presents to the ED with c/o constipation for the last 2 weeks with belly pain. Pt states that she used Miralax but has had no relief. Is a 50-year-old female, French-speaking only which  was used for entirety evaluation, presenting today with concerns of constipation for the last "2 weeks". Patient states she has not had any bowel movement since that, but is still passing gas. States that she has tried 1 dose of MiraLAX at home and it was not successful. Patient denies any other symptoms including blood in stool, nausea, vomiting. States she does have a mild amount of abdominal pain. Denies any chest pain, shortness of breath, dysuria, frequency, flank pain, or any other symptoms at this time. Prior to Admission Medications   Prescriptions Last Dose Informant Patient Reported? Taking?    Multiple Vitamin (multivitamin) tablet  Self Yes No   Sig: Take 1 tablet by mouth daily   albuterol (2.5 mg/3 mL) 0.083 % nebulizer solution   No No   Sig: Take 3 mL (2.5 mg total) by nebulization every 6 (six) hours as needed for wheezing or shortness of breath   albuterol (PROVENTIL HFA,VENTOLIN HFA) 90 mcg/act inhaler   No No   Sig: INH 1 TO 2 PUFFS EVERY SIX HOURS AS NEEDED FOR WHEEZING OR SHORTNESS OF BREATH   ondansetron (ZOFRAN) 4 mg tablet   No No   Sig: Take 1 tablet (4 mg total) by mouth every 6 (six) hours   triamcinolone (KENALOG) 0.1 % ointment   No No   Sig: Apply topically 2 (two) times a day      Facility-Administered Medications: None       Past Medical History:   Diagnosis Date    Asthma     Endometriosis     GERD (gastroesophageal reflux disease)        Past Surgical History:   Procedure Laterality Date    LAPAROSCOPY      for endometriosis    IN EXC B9 LESION MRGN XCP SK TG F/E/E/N/L/M > 4.0CM N/A 2/25/2022    Procedure: EXCISION OF FOREHEAD SUBCUTANEOUS LESION WITH COMPLEX CLOSURE;  Surgeon: Haydee Chauhan MD; Location: [facility];  Service: Plastics       Family History   Problem Relation Age of Onset    Anxiety disorder Mother     Depression Mother     Asthma Mother     Asthma Brother      I have reviewed and agree with the history as documented. E-Cigarette/Vaping    E-Cigarette Use Never User      E-Cigarette/Vaping Substances     Social History     Tobacco Use    Smoking status: Some Days    Smokeless tobacco: Never    Tobacco comments:     hookah soically    Vaping Use    Vaping Use: Never used   Substance Use Topics    Alcohol use: Yes     Comment: socially    Drug use: Never       Review of Systems   Constitutional:  Negative for chills and fever. HENT:  Negative for ear pain and sore throat. Eyes:  Negative for pain and visual disturbance. Respiratory:  Negative for cough and shortness of breath. Cardiovascular:  Negative for chest pain and palpitations. Gastrointestinal:  Positive for abdominal pain and constipation. Negative for anal bleeding, blood in stool, diarrhea, nausea, rectal pain and vomiting. Genitourinary:  Negative for dysuria and hematuria. Musculoskeletal:  Negative for arthralgias and back pain. Skin:  Negative for color change and rash. Neurological:  Negative for dizziness, seizures, syncope, weakness, light-headedness and headaches. All other systems reviewed and are negative. Physical Exam  Physical Exam  Vitals and nursing note reviewed. Constitutional:       General: She is not in acute distress. Appearance: She is well-developed. HENT:      Head: Normocephalic and atraumatic. Eyes:      Conjunctiva/sclera: Conjunctivae normal.   Cardiovascular:      Rate and Rhythm: Normal rate and regular rhythm. Heart sounds: No murmur heard. Pulmonary:      Effort: Pulmonary effort is normal. No respiratory distress. Breath sounds: Normal breath sounds. No stridor. No wheezing, rhonchi or rales. Chest:      Chest wall: No tenderness.    Abdominal: General: There is no distension. Palpations: Abdomen is soft. There is no mass. Tenderness: There is no abdominal tenderness. There is no right CVA tenderness, left CVA tenderness, guarding or rebound. Hernia: No hernia is present. Musculoskeletal:         General: No swelling. Cervical back: Neck supple. Skin:     General: Skin is warm and dry. Capillary Refill: Capillary refill takes less than 2 seconds. Neurological:      Mental Status: She is alert. Psychiatric:         Mood and Affect: Mood normal.         Vital Signs  ED Triage Vitals [11/02/23 1939]   Temperature Pulse Respirations Blood Pressure SpO2   98.6 °F (37 °C) 86 18 162/87 98 %      Temp Source Heart Rate Source Patient Position - Orthostatic VS BP Location FiO2 (%)   Oral Monitor Sitting Left arm --      Pain Score       --           Vitals:    11/02/23 1939   BP: 162/87   Pulse: 86   Patient Position - Orthostatic VS: Sitting         Visual Acuity      ED Medications  Medications   polyethylene glycol (MIRALAX) packet 17 g (17 g Oral Given 11/2/23 1954)       Diagnostic Studies  Results Reviewed       None                   XR abdomen obstruction series   ED Interpretation by Pam Washburn PA-C (11/02 1956)   Large amount of stool. No obstruction identified      Final Result by Eusebio Carmen MD (11/03 9331)      Mild constipation suggested         Workstation performed: [id number]                    Procedures  Procedures         ED Course                                             Medical Decision Making  59-year-old female presents today with "2 weeks of constipation". Abdominal exam benign, no tenderness to palpation. X-ray did not reveal any obstruction, but did reveal mild constipation and stool volume. While patient take MiraLAX daily and to follow-up with family doctor for further evaluation and treatment. Strict return precautions discussed with patient.     Patient at time of discharge well-appearing in no acute distress, all questions answered. Patient agreeable to plan. Patient's vitals, lab/imaging results, diagnosis, and treatment plan were discussed with the patient. All new/changed medications were discussed with patient, specifically, route of administration, how often and when to take, and where they can be picked up. Strict return precautions as well as close follow up with PCP was discussed with the patient and the patient was agreeable to my recommendations. Patient verbally acknowledged understanding of the above communications. All labs reviewed and utilized in the medical decision making process (if labs were ordered). Portions of the record may have been created with voice recognition software. Occasional wrong word or "sound a like" substitutions may have occurred due to the inherent limitations of voice recognition software. Read the chart carefully and recognize, using context, where substitutions have occurred. Amount and/or Complexity of Data Reviewed  Radiology: ordered and independent interpretation performed. Disposition  Final diagnoses:   Constipation     Time reflects when diagnosis was documented in both MDM as applicable and the Disposition within this note       Time User Action Codes Description Comment    11/2/2023  7:57 PM [facility] Add [K59.00] Constipation           ED Disposition       ED Disposition   Discharge    Condition   Stable    Date/Time   Thu Nov 2, 2023  7:57 PM    Comment   Yanick Goff discharge to home/self care.                    Follow-up Information       Follow up With Specialties Details Why Contact Kayla Magaña Officer Family Medicine Schedule an appointment as soon as possible for a visit  As needed [address]  [address] [place] [address]  [phone]              Discharge Medication List as of 11/2/2023  7:58 PM        START taking these medications    Details   polyethylene glycol ([address] [place] [address]) 17 g packet Take 17 g by mouth daily for 10 days, Starting Thu 11/2/2023, Until Sun 11/12/2023, Normal           CONTINUE these medications which have NOT CHANGED    Details   albuterol (2.5 mg/3 mL) 0.083 % nebulizer solution Take 3 mL (2.5 mg total) by nebulization every 6 (six) hours as needed for wheezing or shortness of breath, Starting Tue 6/13/2023, Normal      albuterol (PROVENTIL HFA,VENTOLIN HFA) 90 mcg/act inhaler INH 1 TO 2 PUFFS EVERY SIX HOURS AS NEEDED FOR WHEEZING OR SHORTNESS OF BREATH, Normal      Multiple Vitamin (multivitamin) tablet Take 1 tablet by mouth daily, Historical Med      ondansetron (ZOFRAN) 4 mg tablet Take 1 tablet (4 mg total) by mouth every 6 (six) hours, Starting Mon 5/16/2022, Normal      triamcinolone (KENALOG) 0.1 % ointment Apply topically 2 (two) times a day, Starting Tue 6/13/2023, Normal             No discharge procedures on file.     PDMP Review         Value Time User    PDMP Reviewed  Yes 2/25/2022 12:45 PM Amandeep Lafleur PA-C            ED Provider  Electronically Signed by             Sendy Varghese PA-C  11/04/23 2041

## 2023-11-06 DIAGNOSIS — J45.20 MILD INTERMITTENT ASTHMA WITHOUT COMPLICATION: ICD-10-CM

## 2023-11-07 ENCOUNTER — TELEPHONE (OUTPATIENT)
Dept: FAMILY MEDICINE CLINIC | Facility: CLINIC | Age: 23
End: 2023-11-07

## 2023-11-07 RX ORDER — ALBUTEROL SULFATE 90 UG/1
AEROSOL, METERED RESPIRATORY (INHALATION)
Qty: 6.7 G | Refills: 1 | Status: SHIPPED | OUTPATIENT
Start: 2023-11-07

## 2023-11-21 ENCOUNTER — HOSPITAL ENCOUNTER (EMERGENCY)
Facility: HOSPITAL | Age: 23
Discharge: HOME | End: 2023-11-21
Attending: EMERGENCY MEDICINE
Payer: COMMERCIAL

## 2023-11-21 ENCOUNTER — TELEPHONE (OUTPATIENT)
Dept: FAMILY MEDICINE CLINIC | Facility: CLINIC | Age: 23
End: 2023-11-21

## 2023-11-21 VITALS
SYSTOLIC BLOOD PRESSURE: 134 MMHG | OXYGEN SATURATION: 100 % | HEART RATE: 92 BPM | DIASTOLIC BLOOD PRESSURE: 71 MMHG | WEIGHT: 139 LBS | BODY MASS INDEX: 25.42 KG/M2 | RESPIRATION RATE: 16 BRPM | TEMPERATURE: 97.6 F

## 2023-11-21 DIAGNOSIS — B34.9 VIRAL SYNDROME: Primary | ICD-10-CM

## 2023-11-21 LAB
EXT PREGNANCY TEST URINE: NEGATIVE
EXT. CONTROL: NORMAL

## 2023-11-21 PROCEDURE — 99283 EMERGENCY DEPT VISIT LOW MDM: CPT

## 2023-11-21 PROCEDURE — 99284 EMERGENCY DEPT VISIT MOD MDM: CPT

## 2023-11-21 PROCEDURE — 81025 URINE PREGNANCY TEST: CPT

## 2023-11-21 PROCEDURE — 87636 SARSCOV2 & INF A&B AMP PRB: CPT

## 2023-11-21 RX ORDER — ONDANSETRON 4 MG/1
4 TABLET, ORALLY DISINTEGRATING ORAL EVERY 6 HOURS PRN
Qty: 8 TABLET | Refills: 0 | Status: SHIPPED | OUTPATIENT
Start: 2023-11-21

## 2023-11-21 RX ORDER — ONDANSETRON 4 MG/1
4 TABLET, ORALLY DISINTEGRATING ORAL ONCE
Status: COMPLETED | OUTPATIENT
Start: 2023-11-21 | End: 2023-11-21

## 2023-11-21 RX ORDER — SENNOSIDES 8.6 MG
650 CAPSULE ORAL EVERY 8 HOURS PRN
Qty: 30 TABLET | Refills: 0 | Status: SHIPPED | OUTPATIENT
Start: 2023-11-21

## 2023-11-21 RX ADMIN — ONDANSETRON 4 MG: 4 TABLET, ORALLY DISINTEGRATING ORAL at 14:14

## 2023-11-21 NOTE — Clinical Note
Joneen Phoenix was seen and treated in our emergency department on 11/21/2023. pending negative covid-19    Diagnosis:     Kylah  . She may return on this date: 11/24/2023         If you have any questions or concerns, please don't hesitate to call.       Ángela Allison PA-C    ______________________________           _______________          _______________  Hospital Representative                              Date                                Time

## 2023-11-21 NOTE — DISCHARGE INSTRUCTIONS
We will call with any positive results from your COVID-19/influenza test in 1-2 business days. All results will be available on your MyChart. Follow-up with your primary care provider. Stay hydrated. Return to ED for any worsening symptoms as discussed.

## 2023-11-21 NOTE — ED PROVIDER NOTES
HPI: Patient is a 26 y.o. female with past medical history of asthma, GERD, endometriosis who presents with 1-2 days of cough, sore throat, diarrhea, vomiting, and rhinorrhea  which the patient describes at moderate, vomiting resolved. Nonbloody, diarrhea, denies mucus. No current abdominal pain. The patient has had contact with people with similar symptoms. The patient has not taken any medication. History provided by patient   used. No Known Allergies    Past Medical History:   Diagnosis Date    Asthma     Endometriosis     GERD (gastroesophageal reflux disease)       Past Surgical History:   Procedure Laterality Date    LAPAROSCOPY      for endometriosis    SD EXC B9 LESION MRGN XCP SK TG F/E/E/N/L/M > 4.0CM N/A 2/25/2022    Procedure: EXCISION OF FOREHEAD SUBCUTANEOUS LESION WITH COMPLEX CLOSURE;  Surgeon: Suha Olvera MD;  Location: [facility];  Service: Plastics     Social History     Tobacco Use    Smoking status: Some Days    Smokeless tobacco: Never    Tobacco comments:     hookah soically    Vaping Use    Vaping Use: Never used   Substance Use Topics    Alcohol use: Yes     Comment: socially    Drug use: Never       Nursing notes reviewed  Physical Exam:  ED Triage Vitals [11/21/23 1342]   Temperature Pulse Respirations Blood Pressure SpO2   97.6 °F (36.4 °C) 92 16 134/71 100 %      Temp Source Heart Rate Source Patient Position - Orthostatic VS BP Location FiO2 (%)   Tympanic Monitor Sitting Right arm --      Pain Score       No Pain           ROS: Positive for  cough, sore throat, diarrhea, vomiting, and rhinorrhea , the remainder of a 10 organ system ROS was otherwise unremarkable.   General: awake, alert, no acute distress  Head: normocephalic, atraumatic  Eyes: no scleral icterus, no discharge   Ears: external ears normal, hearing grossly intact  Nose: external exam grossly normal, negative nasal discharge, congestion   Mouth:  Erythematous oropharynx without swelling or exudate. Uvula midline. No tonsillar swelling, exudates or abscesses. Neck: symmetric, No JVD noted, trachea midline, no anterior cervical lymphadenopathy and a full range of motion of neck without pain  Pulmonary: Patient in no respiratory distress, speaking in full sentences, managing oral secretions without difficulty, no accessory muscle use, retractions, or belly breathing noted, no adventitious lung sounds auscultated bilaterally. Cardiovascular: appears well perfused, RRR, no murmurs   Abdomen: no distention noted, no tenderness   Musculoskeletal: no deformities noted, tone normal  Neuro: grossly non-focal  Psych: mood and affect appropriate  Skin: warm, dry, no rash     ED Course as of 11/22/23 2200   Tue Nov 21, 2023   1403 And vomiting for 2 days. Once yesterday twice today. Last 2 AM.  Has been able to tolerate apple juice since then. 3 episodes of diarrhea today, watery, abdominal cramping prior to episodes of diarrhea that resolves with the diarrhea. No pain currently. She is also reporting cough, runny nose and sore throat. Coworker was recently sick with similar symptoms. Differential diagnosis includes but is not limited to gastroenteritis, pneumonia, viral URI, strep pharyngitis. The patient is stable and has a history and physical exam consistent with a viral illness. No respiratory distress. Afebrile. No focal lung findings, low clinical suspicion for PNA. Low clinical suspicion for RPA/PTA given exam findings. centor score of 0, do not feel strep testing is necessary at this time. No overt indications for antibiotics. Benign, reassuring abdominal exam. Tolerating PO. Negative urine hcg. COVID19/Influenza testing has been performed. I considered the patient's other medical conditions as applicable/noted above in my medical decision making. The patient improved upon discharge. The plan is for supportive care at home.     The patient (and any family present) verbalized understanding of the discharge instructions and warnings that would necessitate return to the Emergency Department. All questions were answered prior to discharge. Medications   ondansetron (ZOFRAN-ODT) dispersible tablet 4 mg (4 mg Oral Given 11/21/23 1414)     Final diagnoses:   Viral syndrome     Time reflects when diagnosis was documented in both MDM as applicable and the Disposition within this note       Time User Action Codes Description Comment    11/21/2023  2:03 PM Aleah Thompson Add [B34.9] Viral syndrome           ED Disposition       ED Disposition   Discharge    Condition   Stable    Date/Time   Tue Nov 21, 2023  2:27 PM    Comment   Loren Goff discharge to home/self care.                    Follow-up Information       Follow up With Specialties Details Why Contact Info    Minerva Davenport PA-C Family Medicine Schedule an appointment as soon as possible for a visit  For follow up regarding your symptoms [address]  [address]  [address]            Discharge Medication List as of 11/21/2023  2:27 PM        START taking these medications    Details   acetaminophen (TYLENOL) 650 mg CR tablet Take 1 tablet (650 mg total) by mouth every 8 (eight) hours as needed for mild pain, Starting Tue 11/21/2023, Normal      menthol-cetylpyridinium (CEPACOL) 3 MG lozenge Take 1 lozenge (3 mg total) by mouth as needed for sore throat, Starting Tue 11/21/2023, Normal      ondansetron (ZOFRAN-ODT) 4 mg disintegrating tablet Take 1 tablet (4 mg total) by mouth every 6 (six) hours as needed for vomiting for up to 8 doses, Starting Tue 11/21/2023, Normal           CONTINUE these medications which have NOT CHANGED    Details   albuterol (2.5 mg/3 mL) 0.083 % nebulizer solution Take 3 mL (2.5 mg total) by nebulization every 6 (six) hours as needed for wheezing or shortness of breath, Starting Tue 6/13/2023, Normal      albuterol (PROVENTIL HFA,VENTOLIN HFA) 90 mcg/act inhaler INHALE 1 TO TWO PUFFS EVERY SIX HOURS AS NEEDED FOR WHEEZING OR SHORTNESS OF BREATH, Normal      bisacodyl (FLEET) 10 MG/30ML ENEM Insert 30 mL (10 mg total) into the rectum once for 1 dose, Starting Fri 11/3/2023, Normal      Multiple Vitamin (multivitamin) tablet Take 1 tablet by mouth daily, Historical Med      ondansetron (ZOFRAN) 4 mg tablet Take 1 tablet (4 mg total) by mouth every 6 (six) hours, Starting Mon 5/16/2022, Normal      polyethylene glycol (MIRALAX) 17 g packet Take 17 g by mouth 2 (two) times a day as needed (constipation), Starting Fri 11/3/2023, Normal      triamcinolone (KENALOG) 0.1 % ointment Apply topically 2 (two) times a day, Starting Tue 6/13/2023, Normal           No discharge procedures on file.     Electronically Signed by       Bailey Bowden PA-C  11/22/23 4939

## 2023-11-21 NOTE — TELEPHONE ENCOUNTER
11/21/23    PT LEFT MESSAGE REQUESTING A CALL BACK TO [address] APPT      SPOKE TO PT     INFORMED PT THE REASON OF MY CALL    PT [address] 12/12/23

## 2023-12-12 ENCOUNTER — OFFICE VISIT (OUTPATIENT)
Dept: FAMILY MEDICINE CLINIC | Facility: CLINIC | Age: 23
End: 2023-12-12

## 2023-12-12 VITALS
RESPIRATION RATE: 18 BRPM | DIASTOLIC BLOOD PRESSURE: 76 MMHG | HEIGHT: 62 IN | SYSTOLIC BLOOD PRESSURE: 118 MMHG | HEART RATE: 73 BPM | TEMPERATURE: 97.8 F | BODY MASS INDEX: 25.4 KG/M2 | WEIGHT: 138 LBS | OXYGEN SATURATION: 96 %

## 2023-12-12 DIAGNOSIS — Z00.00 ANNUAL PHYSICAL EXAM: Primary | ICD-10-CM

## 2023-12-12 DIAGNOSIS — K59.00 CONSTIPATION, UNSPECIFIED CONSTIPATION TYPE: ICD-10-CM

## 2023-12-12 DIAGNOSIS — J45.20 MILD INTERMITTENT ASTHMA WITHOUT COMPLICATION: ICD-10-CM

## 2023-12-12 DIAGNOSIS — L30.9 ECZEMA, UNSPECIFIED TYPE: ICD-10-CM

## 2023-12-12 PROCEDURE — 99213 OFFICE O/P EST LOW 20 MIN: CPT | Performed by: PHYSICIAN ASSISTANT

## 2023-12-12 PROCEDURE — 99395 PREV VISIT EST AGE 18-39: CPT | Performed by: PHYSICIAN ASSISTANT

## 2023-12-12 RX ORDER — ALBUTEROL SULFATE 2.5 MG/3ML
2.5 SOLUTION RESPIRATORY (INHALATION) EVERY 6 HOURS PRN
Qty: 90 ML | Refills: 2 | Status: SHIPPED | OUTPATIENT
Start: 2023-12-12

## 2023-12-12 RX ORDER — ALBUTEROL SULFATE 90 UG/1
2 AEROSOL, METERED RESPIRATORY (INHALATION) EVERY 6 HOURS PRN
Qty: 18 G | Refills: 1 | Status: SHIPPED | OUTPATIENT
Start: 2023-12-12

## 2023-12-12 RX ORDER — TRIAMCINOLONE ACETONIDE 1 MG/G
OINTMENT TOPICAL 2 TIMES DAILY
Qty: 80 G | Refills: 1 | Status: SHIPPED | OUTPATIENT
Start: 2023-12-12

## 2023-12-12 RX ORDER — SENNOSIDES 8.6 MG
8.6 TABLET ORAL 2 TIMES DAILY PRN
Qty: 60 TABLET | Refills: 1 | Status: SHIPPED | OUTPATIENT
Start: 2023-12-12

## 2023-12-12 NOTE — PROGRESS NOTES
[facility]    NAME: Irving Goff  AGE: 26 y.o. SEX: female  : 2000     DATE: 2023     Assessment and Plan:     Problem List Items Addressed This Visit        Respiratory    Mild intermittent asthma without complication     - Stable. Continue albuterol inhaler as needed. Will refill.   - Will refill nebulizer solution. Relevant Medications    albuterol (PROVENTIL HFA,VENTOLIN HFA) 90 mcg/act inhaler    albuterol (2.5 mg/3 mL) 0.083 % nebulizer solution       Musculoskeletal and Integument    Eczema     - Much better controlled. Continue triamcinolone 0.1% ointment twice daily as needed for flares. - Continue using moisturizer. Relevant Medications    triamcinolone (KENALOG) 0.1 % ointment       Other    Constipation     - Patient has tried MiraLAX with little relief. - Will prescribe senna 8.6 mg, twice daily as needed. - Discussed pathophysiology of constipation with patient. - Increase fluid intake, primarily water. Increase daily dietary fiber (dark leafy green vegetables, apples with skin, whole grain breads). - May use OTC fiber source to supplement diet (Metamucil, Citrucel, FiberCon, Benefiber). - Increase daily activity which can help stimulate bowel movements. Relevant Medications    senna (SENOKOT) 8.6 mg    bisacodyl (FLEET) 10 MG/30ML ENEM   Other Visit Diagnoses     Annual physical exam    -  Primary          Immunizations and preventive care screenings were discussed with patient today. Appropriate education was printed on patient's after visit summary. Counseling:  Alcohol/drug use: discussed moderation in alcohol intake, the recommendations for healthy alcohol use, and avoidance of illicit drug use. Dental Health: discussed importance of regular tooth brushing, flossing, and dental visits.   Injury prevention: discussed safety/seat belts, safety helmets, smoke detectors, carbon dioxide detectors, and smoking near bedding or upholstery. Sexual health: discussed sexually transmitted diseases, partner selection, use of condoms, avoidance of unintended pregnancy, and contraceptive alternatives. Exercise: the importance of regular exercise/physical activity was discussed. Recommend exercise 3-5 times per week for at least 30 minutes. Return in about 1 year (around 12/12/2024) for Annual physical.     Chief Complaint:     Chief Complaint   Patient presents with   • Follow-up      History of Present Illness:     Adult Annual Physical   Patient here for a comprehensive physical exam.  Patient notes a rash of her hands is now very well-controlled. She would like a refill of the triamcinolone cream.  Patient notes she has been experiencing constipation. She did go to the ED and was prescribed MiraLAX and enema. Patient notes that MiraLAX did not help. Diet and Physical Activity  Diet/Nutrition: well balanced diet. Very picky eater. Exercise:  active at work . Active at work    General Health  Sleep: sleeps well. Hearing: normal - bilateral.  Vision: goes for regular eye exams and as not able to  glasses yet because they are expensive . Dental: regular dental visits. /GYN Health  Last menstrual period: 10/23/23  Last PAP: Per patient, follows with Dr. Vandana Colmenares and last pap was last year and results were normal.      Review of Systems:     Review of Systems   Constitutional:  Negative for appetite change and fever. HENT:  Negative for congestion and sore throat. Eyes:  Negative for pain. Respiratory:  Negative for cough, chest tightness and shortness of breath. Cardiovascular:  Negative for chest pain and palpitations. Gastrointestinal:  Negative for abdominal pain, constipation, diarrhea, nausea and vomiting. Genitourinary:  Negative for difficulty urinating and dysuria. Musculoskeletal:  Negative for arthralgias. Neurological:  Negative for dizziness and headaches. Psychiatric/Behavioral:  The patient is not nervous/anxious. Past Medical History:     Past Medical History:   Diagnosis Date   • Asthma    • Endometriosis    • GERD (gastroesophageal reflux disease)       Past Surgical History:     Past Surgical History:   Procedure Laterality Date   • LAPAROSCOPY      for endometriosis   • [place] EXC B9 LESION MRGN XCP SK TG F/E/E/N/L/M > 4.0CM N/A 2/25/2022    Procedure: EXCISION OF FOREHEAD SUBCUTANEOUS LESION WITH COMPLEX CLOSURE;  Surgeon: Haydee Chauhan MD;  Location: [facility];  Service: Plastics      Social History:     Social History     Socioeconomic History   • Marital status: Single     Spouse name: None   • Number of children: 0   • Years of education: None   • Highest education level: None   Occupational History   • None   Tobacco Use   • Smoking status: Some Days   • Smokeless tobacco: Never   • Tobacco comments:     hookah soically    Vaping Use   • Vaping Use: Never used   Substance and Sexual Activity   • Alcohol use: Yes     Comment: socially   • Drug use: Never   • Sexual activity: None   Other Topics Concern   • None   Social History Narrative   • None     Social Determinants of Health     Financial Resource Strain: Low Risk  (6/13/2023)    Overall Financial Resource Strain (CARDIA)    • Difficulty of Paying Living Expenses: Not hard at all   Food Insecurity: No Food Insecurity (6/13/2023)    Hunger Vital Sign    • Worried About Running Out of Food in the Last Year: Never true    • Ran Out of Food in the Last Year: Never true   Transportation Needs: No Transportation Needs (6/13/2023)    PRAPARE - Transportation    • Lack of Transportation (Medical): No    • Lack of Transportation (Non-Medical):  No   Physical Activity: Not on file   Stress: Not on file   Social Connections: Not on file   Intimate Partner Violence: Not on file   Housing Stability: Not on file       Social Determinants of Health Tobacco Use: High Risk (12/12/2023)    Patient History    • Smoking Tobacco Use: Some Days    • Smokeless Tobacco Use: Never    • Passive Exposure: Not on file   Alcohol Use: Not on file   Financial Resource Strain: Low Risk  (6/13/2023)    Overall Financial Resource Strain (CARDIA)    • Difficulty of Paying Living Expenses: Not hard at all   Food Insecurity: No Food Insecurity (6/13/2023)    Hunger Vital Sign    • Worried About Running Out of Food in the Last Year: Never true    • Ran Out of Food in the Last Year: Never true   Transportation Needs: No Transportation Needs (6/13/2023)    PRAPARE - Transportation    • Lack of Transportation (Medical): No    • Lack of Transportation (Non-Medical):  No   Physical Activity: Not on file   Stress: Not on file   Social Connections: Not on file   Intimate Partner Violence: Not on file   Depression: Not at risk (1/25/2021)    PHQ-2    • PHQ-2 Score: 2   Housing Stability: Not on file   Utilities: Not on file        Family History:     Family History   Problem Relation Age of Onset   • Anxiety disorder Mother    • Depression Mother    • Asthma Mother    • Asthma Brother       Current Medications:     Current Outpatient Medications   Medication Sig Dispense Refill   • albuterol (2.5 mg/3 mL) 0.083 % nebulizer solution Take 3 mL (2.5 mg total) by nebulization every 6 (six) hours as needed for wheezing or shortness of breath 90 mL 2   • albuterol (PROVENTIL HFA,VENTOLIN HFA) 90 mcg/act inhaler Inhale 2 puffs every 6 (six) hours as needed for wheezing 18 g 1   • bisacodyl (FLEET) 10 MG/30ML ENEM Insert 30 mL (10 mg total) into the rectum once for 1 dose 30 mL 2   • senna (SENOKOT) 8.6 mg Take 1 tablet (8.6 mg total) by mouth 2 (two) times a day as needed for constipation 60 tablet 1   • triamcinolone (KENALOG) 0.1 % ointment Apply topically 2 (two) times a day 80 g 1   • acetaminophen (TYLENOL) 650 mg CR tablet Take 1 tablet (650 mg total) by mouth every 8 (eight) hours as needed for mild pain 30 tablet 0   • menthol-cetylpyridinium (CEPACOL) 3 MG lozenge Take 1 lozenge (3 mg total) by mouth as needed for sore throat 30 lozenge 0   • Multiple Vitamin (multivitamin) tablet Take 1 tablet by mouth daily     • ondansetron (ZOFRAN) 4 mg tablet Take 1 tablet (4 mg total) by mouth every 6 (six) hours 12 tablet 0   • ondansetron (ZOFRAN-ODT) 4 mg disintegrating tablet Take 1 tablet (4 mg total) by mouth every 6 (six) hours as needed for vomiting for up to 8 doses 8 tablet 0   • polyethylene glycol (MIRALAX) 17 g packet Take 17 g by mouth daily for 10 days 170 g 0   • polyethylene glycol (MIRALAX) 17 g packet Take 17 g by mouth 2 (two) times a day as needed (constipation) 14 each 0     No current facility-administered medications for this visit. Allergies:     No Known Allergies   Physical Exam:     /76 (BP Location: Right arm, Patient Position: Sitting, Cuff Size: Standard)   Pulse 73   Temp 97.8 °F (36.6 °C) (Temporal)   Resp 18   Ht 5' 2" (1.575 m)   Wt 62.6 kg (138 lb)   LMP 10/23/2023 (Approximate)   SpO2 96%   BMI 25.24 kg/m²     Physical Exam  Vitals and nursing note reviewed. Constitutional:       General: She is not in acute distress. Appearance: She is well-developed. HENT:      Head: Normocephalic and atraumatic. Right Ear: External ear normal.      Left Ear: External ear normal.      Nose: Nose normal.   Eyes:      Conjunctiva/sclera: Conjunctivae normal.   Cardiovascular:      Rate and Rhythm: Normal rate and regular rhythm. Pulses: Normal pulses. Heart sounds: Normal heart sounds. Pulmonary:      Effort: Pulmonary effort is normal. No respiratory distress. Breath sounds: Normal breath sounds. No wheezing. Abdominal:      General: Bowel sounds are normal.      Palpations: Abdomen is soft. Tenderness: There is no abdominal tenderness. Musculoskeletal:         General: Normal range of motion.       Cervical back: Normal range of motion and neck supple. Skin:     General: Skin is warm and dry. Neurological:      Mental Status: She is alert and oriented to person, place, and time. Psychiatric:         Behavior: Behavior normal.         - Utilized [employer] services for translation.       Burke Mcknight PA-C   [address] [facility]

## 2023-12-12 NOTE — ASSESSMENT & PLAN NOTE
- Much better controlled. Continue triamcinolone 0.1% ointment twice daily as needed for flares. - Continue using moisturizer.

## 2023-12-12 NOTE — ASSESSMENT & PLAN NOTE
- Patient has tried MiraLAX with little relief. - Will prescribe senna 8.6 mg, twice daily as needed. - Discussed pathophysiology of constipation with patient. - Increase fluid intake, primarily water. Increase daily dietary fiber (dark leafy green vegetables, apples with skin, whole grain breads). - May use OTC fiber source to supplement diet (Metamucil, Citrucel, FiberCon, Benefiber). - Increase daily activity which can help stimulate bowel movements.

## 2023-12-13 ENCOUNTER — HOSPITAL ENCOUNTER (EMERGENCY)
Facility: HOSPITAL | Age: 23
Discharge: HOME | End: 2023-12-13
Attending: EMERGENCY MEDICINE
Payer: COMMERCIAL

## 2023-12-13 ENCOUNTER — TELEPHONE (OUTPATIENT)
Dept: ADMINISTRATIVE | Facility: OTHER | Age: 23
End: 2023-12-13

## 2023-12-13 VITALS
SYSTOLIC BLOOD PRESSURE: 115 MMHG | HEART RATE: 76 BPM | TEMPERATURE: 97.3 F | DIASTOLIC BLOOD PRESSURE: 74 MMHG | OXYGEN SATURATION: 100 % | RESPIRATION RATE: 20 BRPM | BODY MASS INDEX: 25.52 KG/M2 | WEIGHT: 139.55 LBS

## 2023-12-13 DIAGNOSIS — B34.9 VIRAL ILLNESS: Primary | ICD-10-CM

## 2023-12-13 LAB
BACTERIA UR QL AUTO: ABNORMAL /HPF
BILIRUB UR QL STRIP: NEGATIVE
CLARITY UR: CLEAR
COLOR UR: ABNORMAL
EXT PREGNANCY TEST URINE: NEGATIVE
EXT. CONTROL: NORMAL
FLUAV RNA RESP QL NAA+PROBE: NEGATIVE
FLUBV RNA RESP QL NAA+PROBE: NEGATIVE
GLUCOSE UR STRIP-MCNC: NEGATIVE MG/DL
HGB UR QL STRIP.AUTO: 250
KETONES UR STRIP-MCNC: ABNORMAL MG/DL
LEUKOCYTE ESTERASE UR QL STRIP: NEGATIVE
NITRITE UR QL STRIP: NEGATIVE
NON-SQ EPI CELLS URNS QL MICRO: ABNORMAL /HPF
PH UR STRIP.AUTO: 7 [PH]
PROT UR STRIP-MCNC: ABNORMAL MG/DL
RBC #/AREA URNS AUTO: ABNORMAL /HPF
RSV RNA RESP QL NAA+PROBE: NEGATIVE
S PYO DNA THROAT QL NAA+PROBE: NOT DETECTED
SARS-COV-2 RNA RESP QL NAA+PROBE: NEGATIVE
SP GR UR STRIP.AUTO: 1.01 (ref 1–1.04)
UROBILINOGEN UA: 8 MG/DL
WBC #/AREA URNS AUTO: ABNORMAL /HPF

## 2023-12-13 PROCEDURE — 81025 URINE PREGNANCY TEST: CPT | Performed by: PHYSICIAN ASSISTANT

## 2023-12-13 PROCEDURE — 87651 STREP A DNA AMP PROBE: CPT | Performed by: PHYSICIAN ASSISTANT

## 2023-12-13 PROCEDURE — 0241U HB NFCT DS VIR RESP RNA 4 TRGT: CPT | Performed by: PHYSICIAN ASSISTANT

## 2023-12-13 PROCEDURE — 99284 EMERGENCY DEPT VISIT MOD MDM: CPT | Performed by: PHYSICIAN ASSISTANT

## 2023-12-13 PROCEDURE — 99284 EMERGENCY DEPT VISIT MOD MDM: CPT

## 2023-12-13 PROCEDURE — 81001 URINALYSIS AUTO W/SCOPE: CPT | Performed by: PHYSICIAN ASSISTANT

## 2023-12-13 PROCEDURE — 81003 URINALYSIS AUTO W/O SCOPE: CPT | Performed by: PHYSICIAN ASSISTANT

## 2023-12-13 RX ORDER — DICYCLOMINE HCL 20 MG
20 TABLET ORAL EVERY 6 HOURS
Qty: 12 TABLET | Refills: 0 | Status: SHIPPED | OUTPATIENT
Start: 2023-12-13 | End: 2023-12-16

## 2023-12-13 RX ORDER — BENZONATATE 200 MG/1
200 CAPSULE ORAL 3 TIMES DAILY PRN
Qty: 15 CAPSULE | Refills: 0 | Status: SHIPPED | OUTPATIENT
Start: 2023-12-13 | End: 2023-12-18

## 2023-12-13 NOTE — TELEPHONE ENCOUNTER
----- Message from Effie Hernandez PA-C sent at 12/12/2023  2:28 PM EST -----  Regarding: PAP  12/12/23 2:28 PM    Hello, our patient Kylah Goff has had Pap Smear (HPV) aka Cervical Cancer Screening completed/performed. Please assist in updating the patient chart by making an External outreach to Dr. Bhakta facility located in [place], [place]. The date of service is 2022.    Dr. Zurdo Bhakta   [address] [id number] [phone]      Thank you,  Effie Hernandez PA-C   CHARLES YOUNG

## 2023-12-13 NOTE — Clinical Note
Shyann Fazal was seen and treated in our emergency department on 12/13/2023. No restrictions            Diagnosis:     Scott Burleson  may return to work on return date. She may return on this date: 12/14/2023         If you have any questions or concerns, please don't hesitate to call.       Lashon Bridges PA-C    ______________________________           _______________          _______________  Hospital Representative                              Date                                Time

## 2023-12-13 NOTE — LETTER
Procedure Request Form: Cervical Cancer Screening      Date Requested: 12/15/23  Patient: Kylah Goff  Patient : 2000   Referring Provider: Effie Hernandez PA-C        Date of Procedure ______________________________       The above patient has informed us that they have completed their   most recent Cervical Cancer Screening at your facility. Please complete   this form and attach all corresponding procedure reports/results.    Comments __________________________________________________________  ____________________________________________________________________  ____________________________________________________________________  ____________________________________________________________________    Facility Completing Procedure _________________________________________    Form Completed By (print name) _______________________________________      Signature __________________________________________________________      These reports are needed for  compliance.    Please fax this completed form and a copy of the procedure report to our office located at [address] [place] [address] as soon as possible to Fax [phone] randell Contreras: Phone [phone]    We thank you for your assistance in treating our mutual patient.

## 2023-12-13 NOTE — ED PROVIDER NOTES
History  Chief Complaint   Patient presents with    Abdominal Pain     Reports abd pain, diarrhea, sore throat, cough, pain in chest with cough. Reports mid abd pain      19yo female who presents to ER for evaluation of multiple complaints. Onset 3 days ago. States she has a cough, sore throat, upper abdominal discomfort, and diarrhea. Denies fever. Denies n/v. States she is currently with her normal menstrual cycle. Denies sick contacts. Denies wheezing. States her asthma seems controlled. History provided by:  Patient  Abdominal Pain  Associated symptoms: cough, diarrhea and sore throat    Associated symptoms: no chills, no dysuria, no fever, no shortness of breath, no vaginal discharge and no vomiting        Prior to Admission Medications   Prescriptions Last Dose Informant Patient Reported? Taking?    Multiple Vitamin (multivitamin) tablet  Self Yes No   Sig: Take 1 tablet by mouth daily   acetaminophen (TYLENOL) 650 mg CR tablet   No No   Sig: Take 1 tablet (650 mg total) by mouth every 8 (eight) hours as needed for mild pain   albuterol (2.5 mg/3 mL) 0.083 % nebulizer solution   No No   Sig: Take 3 mL (2.5 mg total) by nebulization every 6 (six) hours as needed for wheezing or shortness of breath   albuterol (PROVENTIL HFA,VENTOLIN HFA) 90 mcg/act inhaler   No No   Sig: Inhale 2 puffs every 6 (six) hours as needed for wheezing   bisacodyl (FLEET) 10 MG/30ML ENEM   No No   Sig: Insert 30 mL (10 mg total) into the rectum once for 1 dose   menthol-cetylpyridinium (CEPACOL) 3 MG lozenge   No No   Sig: Take 1 lozenge (3 mg total) by mouth as needed for sore throat   ondansetron (ZOFRAN) 4 mg tablet   No No   Sig: Take 1 tablet (4 mg total) by mouth every 6 (six) hours   ondansetron (ZOFRAN-ODT) 4 mg disintegrating tablet   No No   Sig: Take 1 tablet (4 mg total) by mouth every 6 (six) hours as needed for vomiting for up to 8 doses   polyethylene glycol (MIRALAX) 17 g packet   No No   Sig: Take 17 g by mouth daily for 10 days   polyethylene glycol (MIRALAX) 17 g packet   No No   Sig: Take 17 g by mouth 2 (two) times a day as needed (constipation)   senna (SENOKOT) 8.6 mg   No No   Sig: Take 1 tablet (8.6 mg total) by mouth 2 (two) times a day as needed for constipation   triamcinolone (KENALOG) 0.1 % ointment   No No   Sig: Apply topically 2 (two) times a day      Facility-Administered Medications: None       Past Medical History:   Diagnosis Date    Asthma     Endometriosis     GERD (gastroesophageal reflux disease)        Past Surgical History:   Procedure Laterality Date    LAPAROSCOPY      for endometriosis    [place] EXC B9 LESION MRGN XCP SK TG F/E/E/N/L/M > 4.0CM N/A 2/25/2022    Procedure: EXCISION OF FOREHEAD SUBCUTANEOUS LESION WITH COMPLEX CLOSURE;  Surgeon: Lamont Harden MD;  Location: [facility];  Service: Plastics       Family History   Problem Relation Age of Onset    Anxiety disorder Mother     Depression Mother     Asthma Mother     Asthma Brother      I have reviewed and agree with the history as documented. E-Cigarette/Vaping    E-Cigarette Use Never User      E-Cigarette/Vaping Substances     Social History     Tobacco Use    Smoking status: Some Days    Smokeless tobacco: Never    Tobacco comments:     hookah soically    Vaping Use    Vaping status: Never Used   Substance Use Topics    Alcohol use: Yes     Comment: socially    Drug use: Never       Review of Systems   Constitutional:  Negative for chills and fever. HENT:  Positive for sore throat. Negative for ear pain. Respiratory:  Positive for cough. Negative for shortness of breath. Gastrointestinal:  Positive for abdominal pain and diarrhea. Negative for vomiting. Genitourinary:  Negative for dysuria and vaginal discharge. Skin:  Negative for rash. Physical Exam  Physical Exam  Vitals and nursing note reviewed. Constitutional:       Appearance: Normal appearance. She is well-developed. HENT:      Head: Atraumatic. Right Ear: Tympanic membrane and external ear normal.      Left Ear: Tympanic membrane and external ear normal.      Nose: Nose normal. No rhinorrhea. Mouth/Throat:      Mouth: Mucous membranes are moist.      Pharynx: Uvula midline. No oropharyngeal exudate or posterior oropharyngeal erythema. Tonsils: No tonsillar exudate. Eyes:      General: No scleral icterus. Conjunctiva/sclera: Conjunctivae normal.   Cardiovascular:      Rate and Rhythm: Normal rate and regular rhythm. Heart sounds: Normal heart sounds. Pulmonary:      Effort: Pulmonary effort is normal.      Breath sounds: Normal breath sounds. Chest:      Chest wall: No tenderness. Abdominal:      General: Bowel sounds are normal. There is no distension. Palpations: Abdomen is soft. Tenderness: There is abdominal tenderness (mild) in the epigastric area. There is no right CVA tenderness, left CVA tenderness or guarding. Negative signs include Noriega's sign and McBurney's sign. Musculoskeletal:         General: Normal range of motion. Cervical back: Neck supple. Lymphadenopathy:      Cervical: No cervical adenopathy. Skin:     General: Skin is warm and dry. Findings: No rash. Neurological:      Mental Status: She is alert and oriented to person, place, and time.    Psychiatric:         Mood and Affect: Mood normal.         Vital Signs  ED Triage Vitals [12/13/23 1125]   Temperature Pulse Respirations Blood Pressure SpO2   (!) 97.3 °F (36.3 °C) 76 20 115/74 100 %      Temp Source Heart Rate Source Patient Position - Orthostatic VS BP Location FiO2 (%)   Tympanic Monitor Sitting Left arm --      Pain Score       8           Vitals:    12/13/23 1125   BP: 115/74   Pulse: 76   Patient Position - Orthostatic VS: Sitting         Visual Acuity      ED Medications  Medications - No data to display    Diagnostic Studies  Results Reviewed       Procedure Component Value Units Date/Time    Urine Microscopic [[id number]]  (Abnormal) Collected: 12/13/23 1150    Lab Status: Final result Specimen: Urine, Clean Catch Updated: 12/13/23 1251     RBC, UA 4-10 /hpf      WBC, UA 1-2 /hpf      Epithelial Cells Occasional /hpf      Bacteria, UA Occasional /hpf     FLU/RSV/COVID - if FLU/RSV clinically relevant [[id number]]  (Normal) Collected: 12/13/23 1150    Lab Status: Final result Specimen: Nares from Nasopharyngeal Swab Updated: 12/13/23 1236     SARS-CoV-2 Negative     INFLUENZA A PCR Negative     INFLUENZA B PCR Negative     RSV PCR Negative    Narrative:      FOR PEDIATRIC PATIENTS - copy/paste COVID Guidelines URL to browser: [email or web address] ashx    SARS-CoV-2 assay is a Nucleic Acid Amplification assay intended for the  qualitative detection of nucleic acid from SARS-CoV-2 in nasopharyngeal  swabs. Results are for the presumptive identification of SARS-CoV-2 RNA. Positive results are indicative of infection with SARS-CoV-2, the virus  causing COVID-19, but do not rule out bacterial infection or co-infection  with other viruses. Laboratories within the [facility] and its  territories are required to report all positive results to the appropriate  public health authorities. Negative results do not preclude SARS-CoV-2  infection and should not be used as the sole basis for treatment or other  patient management decisions. Negative results must be combined with  clinical observations, patient history, and epidemiological information. This test has not been FDA cleared or approved. This test has been authorized by FDA under an Emergency Use Authorization  (EUA). This test is only authorized for the duration of time the  declaration that circumstances exist justifying the authorization of the  emergency use of an in vitro diagnostic tests for detection of SARS-CoV-2  virus and/or diagnosis of COVID-19 infection under section 564(b)(1) of  the Act, 21 U. S.C. 360bbb-3(b)(1), unless the authorization is terminated  or revoked sooner. The test has been validated but independent review by FDA  and CLIA is pending. Test performed using [employer]: This RT-PCR assay targets N2,  a region unique to SARS-CoV-2. A conserved region in the E-gene was chosen  for pan-Sarbecovirus detection which includes SARS-CoV-2. According to CMS-2020-01-R, this platform meets the definition of high-throughput technology. Strep A PCR [[id number]]  (Normal) Collected: 12/13/23 1150    Lab Status: Final result Specimen: Throat Updated: 12/13/23 1225     STREP A PCR Not Detected    UA w Reflex to Microscopic w Reflex to Culture [[id number]]  (Abnormal) Collected: 12/13/23 1150    Lab Status: Final result Specimen: Urine, Clean Catch Updated: 12/13/23 1201     Color, UA Irasema     Clarity, UA Clear     Specific Gravity, UA 1.010     pH, UA 7.0     Leukocytes, UA Negative     Nitrite, UA Negative     Protein, UA 30 (1+) mg/dl      Glucose, UA Negative mg/dl      Ketones, UA 5 (Trace) mg/dl      Bilirubin, UA Negative     Occult Blood, .0     UROBILINOGEN UA 8.0 mg/dL     POCT pregnancy, urine [[id number]]  (Normal) Resulted: 12/13/23 1150    Lab Status: Final result Updated: 12/13/23 1150     EXT Preg Test, Ur Negative     Control Valid                   No orders to display              Procedures  Procedures         ED Course                               SBIRT 20yo+      Flowsheet Row Most Recent Value   Initial Alcohol Screen: US AUDIT-C     1. How often do you have a drink containing alcohol? 0 Filed at: 12/13/2023 1128   2. How many drinks containing alcohol do you have on a typical day you are drinking? 0 Filed at: 12/13/2023 1128   3b. FEMALE Any Age, or MALE 65+: How often do you have 4 or more drinks on one occassion? 0 Filed at: 12/13/2023 1128   Audit-C Score 0 Filed at: 12/13/2023 1128   KARYNA: How many times in the past year have you. ..     Used an illegal drug or used a prescription medication for non-medical reasons? Never Filed at: 12/13/2023 1128                      Medical Decision Making  Amount and/or Complexity of Data Reviewed  Labs: ordered. Disposition  Final diagnoses:   Viral illness     Time reflects when diagnosis was documented in both MDM as applicable and the Disposition within this note       Time User Action Codes Description Comment    12/13/2023 11:53 AM Ritter Player Add [B34.9] Viral illness           ED Disposition       ED Disposition   Discharge    Condition   Stable    Date/Time   Wed Dec 13, 2023 1152    [facility] discharge to home/self care.                    Follow-up Information       Follow up With Specialties Details Why Contact Info Additional Information    [facility] In 3 days  [address]  [address]  [address] [facility]  [phone]       [facility] Emergency Department Emergency Medicine  If symptoms worsen [address] [id number] [address] [facility] Emergency Department            Discharge Medication List as of 12/13/2023 11:55 AM        START taking these medications    Details   benzonatate (TESSALON) 200 MG capsule Take 1 capsule (200 mg total) by mouth 3 (three) times a day as needed for cough for up to 5 days, Starting Wed 12/13/2023, Until Mon 12/18/2023 at 2359, Normal      dicyclomine (BENTYL) 20 mg tablet Take 1 tablet (20 mg total) by mouth every 6 (six) hours for 3 days PRN Diarrhea, Starting Wed 12/13/2023, Until Sat 12/16/2023, Normal           CONTINUE these medications which have NOT CHANGED    Details   acetaminophen (TYLENOL) 650 mg CR tablet Take 1 tablet (650 mg total) by mouth every 8 (eight) hours as needed for mild pain, Starting Tue 11/21/2023, Normal      albuterol (2.5 mg/3 mL) 0.083 % nebulizer solution Take 3 mL (2.5 mg total) by nebulization every 6 (six) hours as needed for wheezing or shortness of breath, Starting Tue 12/12/2023, Normal      albuterol (PROVENTIL HFA,VENTOLIN HFA) 90 mcg/act inhaler Inhale 2 puffs every 6 (six) hours as needed for wheezing, Starting Tue 12/12/2023, Normal      bisacodyl (FLEET) 10 MG/30ML ENEM Insert 30 mL (10 mg total) into the rectum once for 1 dose, Starting Tue 12/12/2023, Normal      menthol-cetylpyridinium (CEPACOL) 3 MG lozenge Take 1 lozenge (3 mg total) by mouth as needed for sore throat, Starting Tue 11/21/2023, Normal      Multiple Vitamin (multivitamin) tablet Take 1 tablet by mouth daily, Historical Med      ondansetron (ZOFRAN) 4 mg tablet Take 1 tablet (4 mg total) by mouth every 6 (six) hours, Starting Mon 5/16/2022, Normal      ondansetron (ZOFRAN-ODT) 4 mg disintegrating tablet Take 1 tablet (4 mg total) by mouth every 6 (six) hours as needed for vomiting for up to 8 doses, Starting Tue 11/21/2023, Normal      polyethylene glycol (MIRALAX) 17 g packet Take 17 g by mouth 2 (two) times a day as needed (constipation), Starting Fri 11/3/2023, Normal      senna (SENOKOT) 8.6 mg Take 1 tablet (8.6 mg total) by mouth 2 (two) times a day as needed for constipation, Starting Tue 12/12/2023, Normal      triamcinolone (KENALOG) 0.1 % ointment Apply topically 2 (two) times a day, Starting Tue 12/12/2023, Normal             No discharge procedures on file.     PDMP Review         Value Time User    PDMP Reviewed  Yes 2/25/2022 12:45 PM Amandeep Lynn PA-C            ED Provider  Electronically Signed by             Rios Merchant PA-C  12/13/23 3070

## 2023-12-13 NOTE — LETTER
Procedure Request Form: Cervical Cancer Screening  Second Request      Date Requested: 23  Patient: Kylah Goff  Patient : 2000   Referring Provider: Effie Hernandez PA-C        Date of Procedure ______________________________       The above patient has informed us that they have completed their   most recent Cervical Cancer Screening at your facility. Please complete   this form and attach all corresponding procedure reports/results.    Comments __________________________________________________________  ____________________________________________________________________  ____________________________________________________________________  ____________________________________________________________________    Facility Completing Procedure _________________________________________    Form Completed By (print name) _______________________________________      Signature __________________________________________________________      These reports are needed for  compliance.    Please fax this completed form and a copy of the procedure report to our office located at [address] [place] [address] as soon as possible to Fax [phone] randell Contreras: Phone [phone]    We thank you for your assistance in treating our mutual patient.

## 2023-12-15 NOTE — TELEPHONE ENCOUNTER
Upon review of the In Basket request and the patient's chart, initial outreach has been made via fax to facility. Please see Contacts section for details.     Thank you  Diane Tidwell

## 2023-12-22 NOTE — TELEPHONE ENCOUNTER
As a follow-up, a second attempt has been made for outreach via fax to facility. Please see Contacts section for details.    Thank you  Diane Tidwell

## 2023-12-23 ENCOUNTER — APPOINTMENT (EMERGENCY)
Dept: RADIOLOGY | Facility: HOSPITAL | Age: 23
End: 2023-12-23
Payer: COMMERCIAL

## 2023-12-23 ENCOUNTER — HOSPITAL ENCOUNTER (EMERGENCY)
Facility: HOSPITAL | Age: 23
Discharge: HOME | End: 2023-12-23
Attending: EMERGENCY MEDICINE | Admitting: EMERGENCY MEDICINE
Payer: COMMERCIAL

## 2023-12-23 VITALS
WEIGHT: 136 LBS | BODY MASS INDEX: 24.87 KG/M2 | RESPIRATION RATE: 20 BRPM | OXYGEN SATURATION: 99 % | DIASTOLIC BLOOD PRESSURE: 58 MMHG | TEMPERATURE: 97.8 F | SYSTOLIC BLOOD PRESSURE: 115 MMHG | HEART RATE: 79 BPM
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
## 2023-12-23 PROCEDURE — 99283 EMERGENCY DEPT VISIT LOW MDM: CPT

## 2023-12-23 PROCEDURE — 73564 X-RAY EXAM KNEE 4 OR MORE: CPT

## 2023-12-23 PROCEDURE — 99284 EMERGENCY DEPT VISIT MOD MDM: CPT
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
## 2023-12-23 NOTE — Clinical Note
Kylah Goff was seen and treated in our emergency department on 12/23/2023.    No restrictions            Diagnosis:     Kylah  .    She may return on this date: 12/25/2023         If you have any questions or concerns, please don't hesitate to call.      Vishnu Fernandez PA-C    ______________________________           _______________          _______________  Hospital Representative                              Date                                Time

## 2023-12-27 NOTE — TELEPHONE ENCOUNTER
As a final attempt, a third outreach has been made via telephone call to facility. Please see Contacts section for details. This encounter will be closed and completed by end of day. Should we receive the requested information because of previous outreach attempts, the requested patient's chart will be updated appropriately.     Thank you  Diane Tidwell

## 2024-01-07 ENCOUNTER — HOSPITAL ENCOUNTER (EMERGENCY)
Facility: HOSPITAL | Age: 24
Discharge: HOME | End: 2024-01-07
Attending: EMERGENCY MEDICINE
Payer: COMMERCIAL

## 2024-01-07 VITALS
OXYGEN SATURATION: 97 % | DIASTOLIC BLOOD PRESSURE: 69 MMHG | SYSTOLIC BLOOD PRESSURE: 117 MMHG | WEIGHT: 139 LBS | BODY MASS INDEX: 25.58 KG/M2 | TEMPERATURE: 98.1 F | RESPIRATION RATE: 20 BRPM | HEIGHT: 62 IN | HEART RATE: 90 BPM

## 2024-01-07 DIAGNOSIS — R19.7 DIARRHEA: ICD-10-CM

## 2024-01-07 DIAGNOSIS — R11.2 NAUSEA & VOMITING: Primary | ICD-10-CM

## 2024-01-07 PROCEDURE — 99284 EMERGENCY DEPT VISIT MOD MDM: CPT | Performed by: EMERGENCY MEDICINE

## 2024-01-07 PROCEDURE — 99283 EMERGENCY DEPT VISIT LOW MDM: CPT

## 2024-01-07 RX ORDER — LOPERAMIDE HYDROCHLORIDE 2 MG/1
2 CAPSULE ORAL ONCE
Status: COMPLETED | OUTPATIENT
Start: 2024-01-07 | End: 2024-01-07

## 2024-01-07 RX ORDER — ONDANSETRON 4 MG/1
4 TABLET, ORALLY DISINTEGRATING ORAL ONCE
Status: COMPLETED | OUTPATIENT
Start: 2024-01-07 | End: 2024-01-07

## 2024-01-07 RX ORDER — LOPERAMIDE HYDROCHLORIDE 2 MG/1
2 CAPSULE ORAL 4 TIMES DAILY PRN
Qty: 12 CAPSULE | Refills: 0 | Status: SHIPPED | OUTPATIENT
Start: 2024-01-07

## 2024-01-07 RX ORDER — ONDANSETRON 4 MG/1
4 TABLET, ORALLY DISINTEGRATING ORAL EVERY 8 HOURS PRN
Qty: 20 TABLET | Refills: 0 | Status: SHIPPED | OUTPATIENT
Start: 2024-01-07

## 2024-01-07 RX ADMIN — LOPERAMIDE HYDROCHLORIDE 2 MG: 2 CAPSULE ORAL at 22:43

## 2024-01-07 RX ADMIN — ONDANSETRON 4 MG: 4 TABLET, ORALLY DISINTEGRATING ORAL at 22:43

## 2024-01-07 NOTE — Clinical Note
Kylah Goff was seen and treated in our emergency department on 1/7/2024.                Diagnosis:     Kylah  may return to work on return date.    She may return on this date: 01/09/2024         If you have any questions or concerns, please don't hesitate to call.      Debbie Noriega RN    ______________________________           _______________          _______________  Hospital Representative                              Date                                Time

## 2024-01-08 NOTE — ED PROVIDER NOTES
"History  Chief Complaint   Patient presents with    Cold Like Symptoms     Patient reports fevers, coughing, diarrhea. Family is all sick with viral illness. Patient requesting nausea meds and something for diarrhea.\"I couldn't find imodium at [facility]\".     24-year-old female presents with complaint of nausea and diarrhea.  Multiple people in her home are sick with similar symptoms.  She attempted to purchase Imodium at the pharmacy but could not find it so she presents here requesting something for nausea and for diarrhea.  She denies any abdominal pain, fevers, or other acute issues.  She declines flu testing at this time.      Nausea  The primary symptoms include nausea and diarrhea. Primary symptoms do not include fever or abdominal pain. The illness began today. The onset was gradual.   The illness does not include chills.       Prior to Admission Medications   Prescriptions Last Dose Informant Patient Reported? Taking?   Multiple Vitamin (multivitamin) tablet  Self Yes No   Sig: Take 1 tablet by mouth daily   albuterol (2.5 mg/3 mL) 0.083 % nebulizer solution   No No   Sig: Take 3 mL (2.5 mg total) by nebulization every 6 (six) hours as needed for wheezing or shortness of breath   albuterol (PROVENTIL HFA,VENTOLIN HFA) 90 mcg/act inhaler   No No   Sig: Inhale 2 puffs every 6 (six) hours as needed for wheezing   bisacodyl (FLEET) 10 MG/30ML ENEM   No No   Sig: Insert 30 mL (10 mg total) into the rectum once for 1 dose   dicyclomine (BENTYL) 20 mg tablet   No No   Sig: Take 1 tablet (20 mg total) by mouth every 6 (six) hours for 3 days PRN Diarrhea   polyethylene glycol (MIRALAX) 17 g packet   No No   Sig: Take 17 g by mouth daily for 10 days   senna (SENOKOT) 8.6 mg   No No   Sig: Take 1 tablet (8.6 mg total) by mouth 2 (two) times a day as needed for constipation   triamcinolone (KENALOG) 0.1 % ointment   No No   Sig: Apply topically 2 (two) times a day      Facility-Administered Medications: None       Past " Medical History:   Diagnosis Date    Asthma     Endometriosis     GERD (gastroesophageal reflux disease)        Past Surgical History:   Procedure Laterality Date    LAPAROSCOPY      for endometriosis    AR EXC B9 LESION MRGN XCP SK TG F/E/E/N/L/M > 4.0CM N/A 2/25/2022    Procedure: EXCISION OF FOREHEAD SUBCUTANEOUS LESION WITH COMPLEX CLOSURE;  Surgeon: Elkin Cade MD;  Location: [facility];  Service: Plastics       Family History   Problem Relation Age of Onset    Anxiety disorder Mother     Depression Mother     Asthma Mother     Asthma Brother      I have reviewed and agree with the history as documented.    E-Cigarette/Vaping    E-Cigarette Use Current Every Day User      E-Cigarette/Vaping Substances    Nicotine Yes      Social History     Tobacco Use    Smoking status: Former     Types: Cigarettes    Smokeless tobacco: Never    Tobacco comments:     hookah soically    Vaping Use    Vaping status: Every Day    Substances: Nicotine   Substance Use Topics    Alcohol use: Yes     Comment: socially    Drug use: Never       Review of Systems   Constitutional:  Negative for chills and fever.   Respiratory:  Negative for shortness of breath.    Cardiovascular:  Negative for chest pain.   Gastrointestinal:  Positive for diarrhea and nausea. Negative for abdominal pain and blood in stool.   All other systems reviewed and are negative.      Physical Exam  Physical Exam  Vitals and nursing note reviewed.   Constitutional:       General: She is not in acute distress.     Appearance: Normal appearance. She is well-developed. She is not ill-appearing or toxic-appearing.   HENT:      Head: Normocephalic and atraumatic.      Right Ear: External ear normal.      Left Ear: External ear normal.      Nose: Nose normal. No congestion.      Mouth/Throat:      Mouth: Mucous membranes are moist.      Pharynx: Oropharynx is clear.   Eyes:      Conjunctiva/sclera: Conjunctivae normal.      Pupils: Pupils are equal, round, and  reactive to light.   Cardiovascular:      Rate and Rhythm: Normal rate and regular rhythm.      Heart sounds: Normal heart sounds.   Pulmonary:      Effort: Pulmonary effort is normal. No respiratory distress.      Breath sounds: Normal breath sounds. No wheezing.   Abdominal:      General: Bowel sounds are normal.      Palpations: Abdomen is soft. There is no mass.      Tenderness: There is no abdominal tenderness. There is no guarding or rebound.   Musculoskeletal:         General: No tenderness or deformity.      Cervical back: Normal range of motion and neck supple. No rigidity.   Skin:     General: Skin is warm and dry.      Capillary Refill: Capillary refill takes less than 2 seconds.      Findings: No rash.   Neurological:      General: No focal deficit present.      Mental Status: She is alert and oriented to person, place, and time.   Psychiatric:         Mood and Affect: Mood normal.         Behavior: Behavior normal.         Vital Signs  ED Triage Vitals [01/07/24 2223]   Temperature Pulse Respirations Blood Pressure SpO2   98.1 °F (36.7 °C) 90 20 117/69 97 %      Temp Source Heart Rate Source Patient Position - Orthostatic VS BP Location FiO2 (%)   Tympanic Monitor Sitting Left arm --      Pain Score       No Pain           Vitals:    01/07/24 2223   BP: 117/69   Pulse: 90   Patient Position - Orthostatic VS: Sitting         Visual Acuity      ED Medications  Medications   ondansetron (ZOFRAN-ODT) dispersible tablet 4 mg (has no administration in time range)   loperamide (IMODIUM) capsule 2 mg (has no administration in time range)       Diagnostic Studies  Results Reviewed       None                   No orders to display              Procedures  Procedures         ED Course                               SBIRT 20yo+      Flowsheet Row Most Recent Value   Initial Alcohol Screen: US AUDIT-C     1. How often do you have a drink containing alcohol? 0 Filed at: 01/07/2024 2225   2. How many drinks containing  alcohol do you have on a typical day you are drinking?  0 Filed at: 01/07/2024 2225   3a. Male UNDER 65: How often do you have five or more drinks on one occasion? 0 Filed at: 01/07/2024 2225   3b. FEMALE Any Age, or MALE 65+: How often do you have 4 or more drinks on one occassion? 0 Filed at: 01/07/2024 2225   Audit-C Score 0 Filed at: 01/07/2024 2225   KARYNA: How many times in the past year have you...    Used an illegal drug or used a prescription medication for non-medical reasons? Never Filed at: 01/07/2024 2225                      Medical Decision Making  24-year-old female presents with complaint of GI issues.  On exam she is in no acute distress and appears to be well-hydrated.  Abdominal exam is benign.  She is requesting medication for nausea and diarrhea.  He has been provided and she will follow-up as needed as an outpatient.  No indication for workup or imaging at this time.             Disposition  Final diagnoses:   Nausea & vomiting   Diarrhea     Time reflects when diagnosis was documented in both MDM as applicable and the Disposition within this note       Time User Action Codes Description Comment    1/7/2024 10:32 PM Leobardo Contreras Add [R11.2] Nausea & vomiting     1/7/2024 10:32 PM Leobardo Contreras Add [R19.7] Diarrhea           ED Disposition       ED Disposition   Discharge    Condition   Stable    Date/Time   Sun Jan 7, 2024 2232    Comment   Kylah Goff discharge to home/self care.                   Follow-up Information       Follow up With Specialties Details Why Contact Info    Effie Hernandez PA-C Family Medicine   [address] [place] [id number]  [phone]              Patient's Medications   Discharge Prescriptions    LOPERAMIDE (IMODIUM) 2 MG CAPSULE    Take 1 capsule (2 mg total) by mouth 4 (four) times a day as needed for diarrhea       Start Date: 1/7/2024  End Date: --       Order Dose: 2 mg       Quantity: 12 capsule    Refills: 0    ONDANSETRON  (ZOFRAN-ODT) 4 MG DISINTEGRATING TABLET    Take 1 tablet (4 mg total) by mouth every 8 (eight) hours as needed for nausea or vomiting       Start Date: 1/7/2024  End Date: --       Order Dose: 4 mg       Quantity: 20 tablet    Refills: 0       No discharge procedures on file.    PDMP Review         Value Time User    PDMP Reviewed  Yes 2/25/2022 12:45 PM Amandeep Case PA-C            ED Provider  Electronically Signed by             Leobardo Contreras DO  01/07/24 7017

## 2024-01-15 DIAGNOSIS — L30.9 ECZEMA, UNSPECIFIED TYPE: ICD-10-CM

## 2024-01-15 DIAGNOSIS — K59.00 CONSTIPATION, UNSPECIFIED CONSTIPATION TYPE: ICD-10-CM

## 2024-01-16 RX ORDER — SENNOSIDES 8.6 MG/1
TABLET, FILM COATED ORAL
Qty: 60 TABLET | Refills: 1 | Status: SHIPPED | OUTPATIENT
Start: 2024-01-16

## 2024-01-16 RX ORDER — TRIAMCINOLONE ACETONIDE 1 MG/G
1 OINTMENT TOPICAL 2 TIMES DAILY
Qty: 80 G | Refills: 1 | Status: SHIPPED | OUTPATIENT
Start: 2024-01-16

## 2024-01-22 PROCEDURE — 99283 EMERGENCY DEPT VISIT LOW MDM: CPT

## 2024-01-23 ENCOUNTER — HOSPITAL ENCOUNTER (EMERGENCY)
Facility: HOSPITAL | Age: 24
Discharge: HOME | End: 2024-01-23
Attending: EMERGENCY MEDICINE
Payer: COMMERCIAL

## 2024-01-23 VITALS
SYSTOLIC BLOOD PRESSURE: 131 MMHG | TEMPERATURE: 97.7 F | HEART RATE: 96 BPM | RESPIRATION RATE: 18 BRPM | BODY MASS INDEX: 25.32 KG/M2 | OXYGEN SATURATION: 100 % | WEIGHT: 138.45 LBS | DIASTOLIC BLOOD PRESSURE: 83 MMHG

## 2024-01-23 DIAGNOSIS — R19.7 DIARRHEA: Primary | ICD-10-CM

## 2024-01-23 DIAGNOSIS — R10.30 LOWER ABDOMINAL PAIN: ICD-10-CM

## 2024-01-23 PROCEDURE — 99284 EMERGENCY DEPT VISIT MOD MDM: CPT

## 2024-01-23 RX ORDER — MAGNESIUM HYDROXIDE/ALUMINUM HYDROXICE/SIMETHICONE 120; 1200; 1200 MG/30ML; MG/30ML; MG/30ML
30 SUSPENSION ORAL ONCE
Status: COMPLETED | OUTPATIENT
Start: 2024-01-23 | End: 2024-01-23

## 2024-01-23 RX ORDER — ALUMINA, MAGNESIA, AND SIMETHICONE 2400; 2400; 240 MG/30ML; MG/30ML; MG/30ML
10 SUSPENSION ORAL EVERY 6 HOURS PRN
Qty: 355 ML | Refills: 0 | Status: SHIPPED | OUTPATIENT
Start: 2024-01-23

## 2024-01-23 RX ORDER — DICYCLOMINE HYDROCHLORIDE 10 MG/1
10 CAPSULE ORAL
Qty: 28 CAPSULE | Refills: 0 | Status: SHIPPED | OUTPATIENT
Start: 2024-01-23 | End: 2024-01-30

## 2024-01-23 RX ORDER — DICYCLOMINE HCL 20 MG
20 TABLET ORAL ONCE
Status: COMPLETED | OUTPATIENT
Start: 2024-01-23 | End: 2024-01-23

## 2024-01-23 RX ADMIN — DICYCLOMINE HYDROCHLORIDE 20 MG: 20 TABLET ORAL at 01:25

## 2024-01-23 RX ADMIN — ALUMINUM HYDROXIDE, MAGNESIUM HYDROXIDE, AND SIMETHICONE 30 ML: 200; 200; 20 SUSPENSION ORAL at 01:25

## 2024-01-23 NOTE — Clinical Note
Kylah Goff was seen and treated in our emergency department on 1/22/2024.                Diagnosis:     Kylah  may return to work on return date.    She may return on this date: 01/25/2024         If you have any questions or concerns, please don't hesitate to call.      Brianda Torres PA-C    ______________________________           _______________          _______________  Hospital Representative                              Date                                Time

## 2024-01-23 NOTE — ED PROVIDER NOTES
History  Chief Complaint   Patient presents with    Abdominal Pain     Pt reports epigastric pain and diarrhea for 3 days. No meds PTA      The patient is a 24-year-old female with a past medical history of GERD, asthma, and endometriosis, who presents for evaluation of diarrhea.  She reports 3 days of intermittent diarrhea and lower abdominal pain.  There is no blood or mucus in the diarrhea.  No associated nausea, vomiting, dysuria, frequency, hematuria, URI symptoms, or fevers.  No known suspicious food intake or sick contacts with similar symptoms.  No medication taken PTA.        Prior to Admission Medications   Prescriptions Last Dose Informant Patient Reported? Taking?   Multiple Vitamin (multivitamin) tablet  Self Yes No   Sig: Take 1 tablet by mouth daily   Senna-Lax 8.6 MG tablet   No No   Sig: TAKE ONE TABLET BY MOUTH TWICE A DAY AS NEEDED FOR CONSTIPATION   albuterol (2.5 mg/3 mL) 0.083 % nebulizer solution   No No   Sig: Take 3 mL (2.5 mg total) by nebulization every 6 (six) hours as needed for wheezing or shortness of breath   albuterol (PROVENTIL HFA,VENTOLIN HFA) 90 mcg/act inhaler   No No   Sig: Inhale 2 puffs every 6 (six) hours as needed for wheezing   bisacodyl (FLEET) 10 MG/30ML ENEM   No No   Sig: Insert 30 mL (10 mg total) into the rectum once for 1 dose   triamcinolone (KENALOG) 0.1 % ointment   No No   Sig: APPLY TOPICALLY TWICE A DAY      Facility-Administered Medications: None       Past Medical History:   Diagnosis Date    Asthma     Endometriosis     GERD (gastroesophageal reflux disease)        Past Surgical History:   Procedure Laterality Date    LAPAROSCOPY      for endometriosis    FL EXC B9 LESION MRGN XCP SK TG F/E/E/N/L/M > 4.0CM N/A 2/25/2022    Procedure: EXCISION OF FOREHEAD SUBCUTANEOUS LESION WITH COMPLEX CLOSURE;  Surgeon: Elkin Cade MD;  Location: [facility];  Service: Plastics       Family History   Problem Relation Age of Onset    Anxiety disorder Mother      Depression Mother     Asthma Mother     Asthma Brother      I have reviewed and agree with the history as documented.    E-Cigarette/Vaping    E-Cigarette Use Current Some Day User      E-Cigarette/Vaping Substances    Flavoring Yes      Social History     Tobacco Use    Smoking status: Former     Types: Cigarettes    Smokeless tobacco: Never    Tobacco comments:     hookah soically    Vaping Use    Vaping status: Some Days    Substances: Flavoring   Substance Use Topics    Alcohol use: Yes     Comment: socially    Drug use: Never       Review of Systems   Constitutional:  Negative for chills and fever.   HENT:  Negative for congestion, ear pain, rhinorrhea and sore throat.    Eyes:  Negative for pain and visual disturbance.   Respiratory:  Negative for cough and shortness of breath.    Cardiovascular:  Negative for chest pain and palpitations.   Gastrointestinal:  Positive for abdominal pain and diarrhea. Negative for abdominal distention, blood in stool, constipation, nausea and vomiting.   Genitourinary:  Negative for difficulty urinating, dysuria, frequency, hematuria and urgency.   Musculoskeletal:  Negative for arthralgias, back pain and myalgias.   Skin:  Negative for color change and rash.   Neurological:  Negative for seizures, syncope, light-headedness and headaches.   All other systems reviewed and are negative.      Physical Exam  Physical Exam  Vitals and nursing note reviewed.   Constitutional:       General: She is awake.      Appearance: She is well-developed and normal weight. She is not toxic-appearing or diaphoretic.   HENT:      Head: Normocephalic and atraumatic.      Right Ear: External ear normal.      Left Ear: External ear normal.      Nose: Nose normal.      Mouth/Throat:      Lips: Pink.      Mouth: Mucous membranes are moist.      Pharynx: Oropharynx is clear. Uvula midline.   Eyes:      General: Lids are normal. Vision grossly intact. Gaze aligned appropriately. No scleral icterus.      Conjunctiva/sclera: Conjunctivae normal.      Pupils: Pupils are equal, round, and reactive to light.   Cardiovascular:      Rate and Rhythm: Normal rate and regular rhythm.      Heart sounds: S1 normal and S2 normal. No murmur heard.     No friction rub. No gallop.   Pulmonary:      Effort: Pulmonary effort is normal. No respiratory distress.      Breath sounds: Normal breath sounds and air entry. No wheezing, rhonchi or rales.   Abdominal:      General: Abdomen is flat. Bowel sounds are increased. There is no distension.      Palpations: Abdomen is soft. There is no mass.      Tenderness: There is abdominal tenderness in the right lower quadrant, suprapubic area and left lower quadrant. There is no right CVA tenderness, left CVA tenderness, guarding or rebound.      Comments: Generalized tenderness to palpation of the lower abdomen, most significant in the suprapubic region.     Musculoskeletal:      Cervical back: Normal, full passive range of motion without pain and neck supple. No rigidity or crepitus. No spinous process tenderness or muscular tenderness.      Thoracic back: Normal. No tenderness.      Lumbar back: Normal. No tenderness.   Skin:     General: Skin is warm.      Capillary Refill: Capillary refill takes less than 2 seconds.      Coloration: Skin is not jaundiced or pale.      Findings: No rash.   Neurological:      Mental Status: She is alert and oriented to person, place, and time.   Psychiatric:         Behavior: Behavior is cooperative.         Vital Signs  ED Triage Vitals [01/23/24 0003]   Temperature Pulse Respirations Blood Pressure SpO2   97.7 °F (36.5 °C) 96 18 131/83 100 %      Temp Source Heart Rate Source Patient Position - Orthostatic VS BP Location FiO2 (%)   Oral Monitor Sitting Right arm --      Pain Score       --           Vitals:    01/23/24 0003   BP: 131/83   Pulse: 96   Patient Position - Orthostatic VS: Sitting         ED Medications  Medications   aluminum-magnesium  hydroxide-simethicone (MAALOX) oral suspension 30 mL (30 mL Oral Given 1/23/24 0125)   dicyclomine (BENTYL) tablet 20 mg (20 mg Oral Given 1/23/24 0125)       Diagnostic Studies  Results Reviewed       None                   No orders to display              Procedures  Procedures         ED Course         SBIRT 20yo+      Flowsheet Row Most Recent Value   Initial Alcohol Screen: US AUDIT-C     1. How often do you have a drink containing alcohol? 0 Filed at: 01/23/2024 0004   2. How many drinks containing alcohol do you have on a typical day you are drinking?  0 Filed at: 01/23/2024 0004   3a. Male UNDER 65: How often do you have five or more drinks on one occasion? 0 Filed at: 01/23/2024 0004   3b. FEMALE Any Age, or MALE 65+: How often do you have 4 or more drinks on one occassion? 0 Filed at: 01/23/2024 0004   Audit-C Score 0 Filed at: 01/23/2024 0004   KARYNA: How many times in the past year have you...    Used an illegal drug or used a prescription medication for non-medical reasons? Never Filed at: 01/23/2024 0004                Medical Decision Making  Patient presents for 3 days of diarrhea with associated lower abdominal pain.  On exam the abdomen is soft and nondistended.  There is generalized tenderness to palpation of the lower abdomen, but no peritoneal signs.  Differential diagnosis includes but is not limited to enteritis, colitis, viral etiology, or food poisoning, doubt infectious etiology, C. Diff, or acute abdominal pathology such as diverticulitis.  As the patient has localized tenderness to palpation of the left lower quadrant, very low suspicion for diverticulitis.  She is hemodynamically stable, therefore doubt dehydration or metabolic abnormality.  Patient given Maalox and Bentyl with relief.  Patient is in agreement with the current treatment plan and all questions were answered.  Strict return precautions discussed and she verbalized understanding.  Follow-up with PCP, but return to the ED in  the interim with new or worsening symptoms.    Problems Addressed:  Diarrhea: acute illness or injury  Lower abdominal pain: acute illness or injury    Risk  OTC drugs.  Prescription drug management.             Disposition  Final diagnoses:   Diarrhea   Lower abdominal pain     Time reflects when diagnosis was documented in both MDM as applicable and the Disposition within this note       Time User Action Codes Description Comment    1/23/2024  1:41 AM Brianda Torres Add [R19.7] Diarrhea     1/23/2024  1:41 AM Brianda Torres Add [R10.30] Lower abdominal pain           ED Disposition       ED Disposition   Discharge    Condition   Stable    Date/Time   Tue Jan 23, 2024 0141    Comment   Kylah Goff discharge to home/self care.                   Follow-up Information       Follow up With Specialties Details Why Contact Info    Effie Hernandez PA-C Family Medicine   [address] [place] [id number]  [phone]              Discharge Medication List as of 1/23/2024  1:43 AM        START taking these medications    Details   aluminum-magnesium hydroxide-simethicone (MAALOX MAX) 400-400-40 MG/5ML suspension Take 10 mL by mouth every 6 (six) hours as needed for indigestion or heartburn, Starting Tue 1/23/2024, Normal      dicyclomine (BENTYL) 10 mg capsule Take 1 capsule (10 mg total) by mouth 4 (four) times a day (before meals and at bedtime) for 7 days, Starting Tue 1/23/2024, Until Tue 1/30/2024, Print           CONTINUE these medications which have NOT CHANGED    Details   albuterol (2.5 mg/3 mL) 0.083 % nebulizer solution Take 3 mL (2.5 mg total) by nebulization every 6 (six) hours as needed for wheezing or shortness of breath, Starting Tue 12/12/2023, Normal      albuterol (PROVENTIL HFA,VENTOLIN HFA) 90 mcg/act inhaler Inhale 2 puffs every 6 (six) hours as needed for wheezing, Starting Tue 12/12/2023, Normal      bisacodyl (FLEET) 10 MG/30ML ENEM Insert 30 mL (10 mg total) into the  rectum once for 1 dose, Starting Tue 12/12/2023, Normal      Multiple Vitamin (multivitamin) tablet Take 1 tablet by mouth daily, Historical Med      Senna-Lax 8.6 MG tablet TAKE ONE TABLET BY MOUTH TWICE A DAY AS NEEDED FOR CONSTIPATION, Normal      triamcinolone (KENALOG) 0.1 % ointment APPLY TOPICALLY TWICE A DAY, Starting Tue 1/16/2024, Normal             No discharge procedures on file.    PDMP Review         Value Time User    PDMP Reviewed  Yes 2/25/2022 12:45 PM Amandeep Case PA-C            ED Provider  Electronically Signed by             Brianda Torres PA-C  01/27/24 3183

## 2024-02-07 ENCOUNTER — OFFICE VISIT (OUTPATIENT)
Dept: FAMILY MEDICINE CLINIC | Facility: CLINIC | Age: 24
End: 2024-02-07

## 2024-02-07 VITALS
WEIGHT: 137 LBS | DIASTOLIC BLOOD PRESSURE: 60 MMHG | RESPIRATION RATE: 18 BRPM | OXYGEN SATURATION: 99 % | SYSTOLIC BLOOD PRESSURE: 108 MMHG | BODY MASS INDEX: 25.21 KG/M2 | HEART RATE: 60 BPM | HEIGHT: 62 IN | TEMPERATURE: 97.5 F

## 2024-02-07 DIAGNOSIS — R10.84 GENERALIZED ABDOMINAL PAIN: ICD-10-CM

## 2024-02-07 DIAGNOSIS — R19.7 DIARRHEA, UNSPECIFIED TYPE: Primary | ICD-10-CM

## 2024-02-07 PROCEDURE — 99213 OFFICE O/P EST LOW 20 MIN: CPT | Performed by: PHYSICIAN ASSISTANT

## 2024-02-07 RX ORDER — OMEPRAZOLE 20 MG/1
20 CAPSULE, DELAYED RELEASE ORAL DAILY
Qty: 30 CAPSULE | Refills: 1 | Status: SHIPPED | OUTPATIENT
Start: 2024-02-07

## 2024-02-07 RX ORDER — SUCRALFATE 1 G/1
1 TABLET ORAL 4 TIMES DAILY PRN
Qty: 120 TABLET | Refills: 1 | Status: SHIPPED | OUTPATIENT
Start: 2024-02-07

## 2024-02-07 NOTE — PROGRESS NOTES
Assessment/Plan:    Generalized abdominal pain/diarrhea  - This has been an ongoing issue for patient for several months.    -Patient has tried taking Maalox, Bentyl, Imodium, Pepto-Bismol with no relief.  - Will refer to gastroenterology for further evaluation and management.  -Reviewed CT abdomen pelvis with contrast from 11/3/2023.  Results revealed no acute CT findings.  No evidence of bowel obstruction.  - Will trial omeprazole 20 mg daily.  -Will trial Carafate 1 g, 30 minutes prior to meals.  - Advised to follow bland diet and advance diet as tolerated.  - Advised to contact the office or report to ED if symptoms persist, worsen, or new symptoms arise.        Diagnoses and all orders for this visit:    Diarrhea, unspecified type  -     Ambulatory Referral to Gastroenterology; Future    Generalized abdominal pain  -     Ambulatory Referral to Gastroenterology; Future  -     omeprazole (PriLOSEC) 20 mg delayed release capsule; Take 1 capsule (20 mg total) by mouth daily  -     sucralfate (CARAFATE) 1 g tablet; Take 1 tablet (1 g total) by mouth 4 (four) times a day as needed (before meals)        All of patients questions were answered. Patient understands and agrees with the above plan.     Return if symptoms worsen or fail to improve.    Effie Hernandez PA-C  02/07/24  Formerly [facility]          Subjective:     Patient ID: Kylah Goff  is a 24 y.o. female with known PMH of anxiety, depression, asthma who presents today in office for same day visit for diarrhea.     - Patient is a 24 y.o. female who presents today for same day visit for diarrhea.  Patient notes for the past 3 days she has been experiencing generalized abdominal pain with associated diarrhea.  She reports the abdominal pain is mostly located in epigastric area.  Patient notes pain is worse when she eats anything.  Patient notes she is able to tolerate fluids without any issues.  Patient notes she has been experiencing about 5  episodes of diarrhea per day.  She denies any blood or mucus in the stool.  Patient notes she has been drinking Sprite, Gatorade, water.  Patient notes she does have some gastric reflux but usually the Sprite helps with this.  She notes she has been taking Maalox, Bentyl, Pepto-Bismol, Imodium with no relief.  She denies any associated fevers, chills, headaches, dizziness, nausea, vomiting, fatigue, body aches.    The following portions of the patient's history were reviewed and updated as appropriate: allergies, current medications, past family history, past medical history, past social history, past surgical history, and problem list.        Review of Systems   Constitutional:  Negative for appetite change and fever.   HENT:  Negative for congestion and sore throat.    Eyes:  Negative for pain.   Respiratory:  Negative for cough, chest tightness and shortness of breath.    Cardiovascular:  Negative for chest pain and palpitations.   Gastrointestinal:  Positive for abdominal pain and diarrhea. Negative for blood in stool, constipation, nausea and vomiting.   Genitourinary:  Negative for difficulty urinating and dysuria.   Musculoskeletal:  Negative for arthralgias.   Neurological:  Negative for dizziness and headaches.   Psychiatric/Behavioral:  The patient is not nervous/anxious.           BMI Counseling: Body mass index is 25.06 kg/m². The BMI is above normal. Nutrition recommendations include decreasing portion sizes, encouraging healthy choices of fruits and vegetables, consuming healthier snacks, limiting drinks that contain sugar, moderation in carbohydrate intake, increasing intake of lean protein and reducing intake of cholesterol. Exercise recommendations include moderate physical activity 150 minutes/week. No pharmacotherapy was ordered. Rationale for BMI follow-up plan is due to patient being overweight or obese.     Depression Screening and Follow-up Plan: Patient was screened for depression during  "today's encounter. They screened negative with a PHQ-9 score of 0.          Objective:   Vitals:    24 1421   BP: 108/60   BP Location: Left arm   Patient Position: Sitting   Cuff Size: Standard   Pulse: 60   Resp: 18   Temp: 97.5 °F (36.4 °C)   TempSrc: Temporal   SpO2: 99%   Weight: 62.1 kg (137 lb)   Height: 5' 2\" (1.575 m)         Physical Exam  Vitals and nursing note reviewed.   Constitutional:       General: She is not in acute distress.     Appearance: She is well-developed.   HENT:      Head: Normocephalic and atraumatic.      Right Ear: External ear normal.      Left Ear: External ear normal.      Nose: Nose normal.   Eyes:      Conjunctiva/sclera: Conjunctivae normal.   Cardiovascular:      Rate and Rhythm: Normal rate and regular rhythm.      Pulses: Normal pulses.      Heart sounds: Normal heart sounds.   Pulmonary:      Effort: Pulmonary effort is normal. No respiratory distress.      Breath sounds: Normal breath sounds. No wheezing.   Abdominal:      General: Bowel sounds are normal.      Palpations: Abdomen is soft.      Tenderness: There is abdominal tenderness (epigastric area). There is no rebound.   Musculoskeletal:         General: Normal range of motion.      Cervical back: Normal range of motion and neck supple.   Skin:     General: Skin is warm and dry.   Neurological:      Mental Status: She is alert and oriented to person, place, and time.   Psychiatric:         Behavior: Behavior normal.           PHQ-2/9 Depression Screening    Little interest or pleasure in doing things: 0 - not at all  Feeling down, depressed, or hopeless: 0 - not at all  Trouble falling or staying asleep, or sleeping too much: 0 - not at all  Feeling tired or having little energy: 0 - not at all  Poor appetite or overeatin - not at all  Feeling bad about yourself - or that you are a failure or have let yourself or your family down: 0 - not at all  Trouble concentrating on things, such as reading the newspaper " or watching television: 0 - not at all  Moving or speaking so slowly that other people could have noticed. Or the opposite - being so fidgety or restless that you have been moving around a lot more than usual: 0 - not at all  Thoughts that you would be better off dead, or of hurting yourself in some way: 0 - not at all  PHQ-9 Score: 0  PHQ-9 Interpretation: No or Minimal depression       - Utilized [email or web address] services for translation.

## 2024-02-14 ENCOUNTER — HOSPITAL ENCOUNTER (EMERGENCY)
Facility: HOSPITAL | Age: 24
Discharge: HOME | End: 2024-02-14
Attending: EMERGENCY MEDICINE
Payer: COMMERCIAL

## 2024-02-14 VITALS
HEART RATE: 61 BPM | TEMPERATURE: 98.3 F | RESPIRATION RATE: 20 BRPM | WEIGHT: 138.89 LBS | OXYGEN SATURATION: 100 % | BODY MASS INDEX: 25.4 KG/M2 | DIASTOLIC BLOOD PRESSURE: 65 MMHG | SYSTOLIC BLOOD PRESSURE: 139 MMHG

## 2024-02-14 DIAGNOSIS — M25.511 ACUTE PAIN OF RIGHT SHOULDER: Primary | ICD-10-CM

## 2024-02-14 LAB
EXT PREGNANCY TEST URINE: NEGATIVE
EXT. CONTROL: NORMAL

## 2024-02-14 PROCEDURE — 99283 EMERGENCY DEPT VISIT LOW MDM: CPT

## 2024-02-14 PROCEDURE — 99284 EMERGENCY DEPT VISIT MOD MDM: CPT | Performed by: EMERGENCY MEDICINE

## 2024-02-14 PROCEDURE — 81025 URINE PREGNANCY TEST: CPT | Performed by: EMERGENCY MEDICINE

## 2024-02-14 PROCEDURE — 96372 THER/PROPH/DIAG INJ SC/IM: CPT

## 2024-02-14 RX ORDER — METHOCARBAMOL 500 MG/1
500 TABLET, FILM COATED ORAL 2 TIMES DAILY
Qty: 20 TABLET | Refills: 0 | Status: SHIPPED | OUTPATIENT
Start: 2024-02-14

## 2024-02-14 RX ORDER — ACETAMINOPHEN 325 MG/1
975 TABLET ORAL ONCE
Status: COMPLETED | OUTPATIENT
Start: 2024-02-14 | End: 2024-02-14

## 2024-02-14 RX ORDER — NAPROXEN 500 MG/1
500 TABLET ORAL 2 TIMES DAILY WITH MEALS
Qty: 15 TABLET | Refills: 0 | Status: SHIPPED | OUTPATIENT
Start: 2024-02-14

## 2024-02-14 RX ORDER — KETOROLAC TROMETHAMINE 30 MG/ML
15 INJECTION, SOLUTION INTRAMUSCULAR; INTRAVENOUS ONCE
Status: COMPLETED | OUTPATIENT
Start: 2024-02-14 | End: 2024-02-14

## 2024-02-14 RX ADMIN — ACETAMINOPHEN 975 MG: 325 TABLET ORAL at 22:31

## 2024-02-14 RX ADMIN — KETOROLAC TROMETHAMINE 15 MG: 30 INJECTION, SOLUTION INTRAMUSCULAR; INTRAVENOUS at 22:31

## 2024-02-14 NOTE — Clinical Note
Kylah Goff was seen and treated in our emergency department on 2/14/2024.            limit use of right shoulder till cleared by PCP or ortho    Diagnosis: right shoulder pain    Kylah  may return to work on return date.    She may return on this date: 02/16/2024         If you have any questions or concerns, please don't hesitate to call.      Alannah Boyle MD    ______________________________           _______________          _______________  Hospital Representative                              Date                                Time

## 2024-02-15 NOTE — ED PROVIDER NOTES
History  Chief Complaint   Patient presents with    Rib Pain     R sided rib pain after an inj at work took Motrin 5 hours PTA     HPI    23 yo F presents to ed for eval of right sided shoulder pain.  States she was pulling a crate at work about 5 days ago, and thinks she pulled a muscle. States pain came on in past few days. No neuro complaints. No pain meds taken except for motrin 5 hours ago. No prior surgery or trauma to affected extremity. No thinners head strike or neck pain. No cp sob.  No other complaints on ROS.    Prior to Admission Medications   Prescriptions Last Dose Informant Patient Reported? Taking?   Multiple Vitamin (multivitamin) tablet  Self Yes No   Sig: Take 1 tablet by mouth daily   Senna-Lax 8.6 MG tablet   No No   Sig: TAKE ONE TABLET BY MOUTH TWICE A DAY AS NEEDED FOR CONSTIPATION   albuterol (2.5 mg/3 mL) 0.083 % nebulizer solution   No No   Sig: Take 3 mL (2.5 mg total) by nebulization every 6 (six) hours as needed for wheezing or shortness of breath   albuterol (PROVENTIL HFA,VENTOLIN HFA) 90 mcg/act inhaler   No No   Sig: Inhale 2 puffs every 6 (six) hours as needed for wheezing   aluminum-magnesium hydroxide-simethicone (MAALOX MAX) 400-400-40 MG/5ML suspension   No No   Sig: Take 10 mL by mouth every 6 (six) hours as needed for indigestion or heartburn   bisacodyl (FLEET) 10 MG/30ML ENEM   No No   Sig: Insert 30 mL (10 mg total) into the rectum once for 1 dose   dicyclomine (BENTYL) 10 mg capsule   No No   Sig: Take 1 capsule (10 mg total) by mouth 4 (four) times a day (before meals and at bedtime) for 7 days   omeprazole (PriLOSEC) 20 mg delayed release capsule   No No   Sig: Take 1 capsule (20 mg total) by mouth daily   sucralfate (CARAFATE) 1 g tablet   No No   Sig: Take 1 tablet (1 g total) by mouth 4 (four) times a day as needed (before meals)   triamcinolone (KENALOG) 0.1 % ointment   No No   Sig: APPLY TOPICALLY TWICE A DAY      Facility-Administered Medications: None        Past Medical History:   Diagnosis Date    Asthma     Endometriosis     GERD (gastroesophageal reflux disease)        Past Surgical History:   Procedure Laterality Date    LAPAROSCOPY      for endometriosis    NV EXC B9 LESION MRGN XCP SK TG F/E/E/N/L/M > 4.0CM N/A 2/25/2022    Procedure: EXCISION OF FOREHEAD SUBCUTANEOUS LESION WITH COMPLEX CLOSURE;  Surgeon: Elkin Cade MD;  Location: [facility];  Service: Plastics       Family History   Problem Relation Age of Onset    Anxiety disorder Mother     Depression Mother     Asthma Mother     Asthma Brother      I have reviewed and agree with the history as documented.    E-Cigarette/Vaping    E-Cigarette Use Current Some Day User      E-Cigarette/Vaping Substances    Flavoring Yes      Social History     Tobacco Use    Smoking status: Former     Types: Cigarettes     Passive exposure: Current    Smokeless tobacco: Never    Tobacco comments:     hookah soically    Vaping Use    Vaping status: Some Days    Substances: Flavoring   Substance Use Topics    Alcohol use: Yes     Comment: socially    Drug use: Never       Review of Systems   Constitutional:  Negative for chills, fatigue and fever.   HENT:  Negative for sore throat.    Eyes:  Negative for redness and visual disturbance.   Respiratory:  Negative for cough and shortness of breath.    Cardiovascular:  Negative for chest pain.   Gastrointestinal:  Negative for abdominal pain, diarrhea and nausea.   Genitourinary:  Negative for difficulty urinating, dysuria and pelvic pain.   Musculoskeletal:  Positive for arthralgias. Negative for back pain.   Skin:  Negative for rash.   Neurological:  Negative for syncope, weakness and headaches.   All other systems reviewed and are negative.      Physical Exam  Physical Exam  Vitals and nursing note reviewed.   Constitutional:       General: She is not in acute distress.  HENT:      Head: Normocephalic and atraumatic.      Right Ear: External ear normal.      Left Ear:  External ear normal.   Eyes:      Extraocular Movements: Extraocular movements intact.      Conjunctiva/sclera: Conjunctivae normal.   Cardiovascular:      Rate and Rhythm: Normal rate and regular rhythm.      Heart sounds: Normal heart sounds.   Pulmonary:      Effort: Pulmonary effort is normal. No respiratory distress.      Breath sounds: Normal breath sounds.   Abdominal:      General: Abdomen is flat.      Tenderness: There is no abdominal tenderness.   Musculoskeletal:         General: Normal range of motion.      Cervical back: Normal range of motion.      Comments: On examination: of R shoulder  Patient has full ROM  No overlying skin changes, or signs of trauma  No laxity of the joint  Distally neurovascularly in tact  Compartments soft  No crepitus noted    Tenderness of R deltoid       Skin:     General: Skin is warm and dry.   Neurological:      Mental Status: She is alert and oriented to person, place, and time.      Cranial Nerves: No cranial nerve deficit.      Motor: No abnormal muscle tone.      Coordination: Coordination normal.         Vital Signs  ED Triage Vitals [02/14/24 2036]   Temperature Pulse Respirations Blood Pressure SpO2   98.3 °F (36.8 °C) 76 18 120/87 99 %      Temp Source Heart Rate Source Patient Position - Orthostatic VS BP Location FiO2 (%)   Tympanic Monitor Sitting Right arm --      Pain Score       --           Vitals:    02/14/24 2036 02/14/24 2215   BP: 120/87 139/65   Pulse: 76 61   Patient Position - Orthostatic VS: Sitting Sitting         Visual Acuity      ED Medications  Medications   ketorolac (TORADOL) injection 15 mg (15 mg Intramuscular Given 2/14/24 2231)   acetaminophen (TYLENOL) tablet 975 mg (975 mg Oral Given 2/14/24 2231)       Diagnostic Studies  Results Reviewed       Procedure Component Value Units Date/Time    POCT pregnancy, urine [[id number]]  (Normal) Resulted: 02/14/24 2225    Lab Status: Final result Updated: 02/14/24 2225     EXT Preg Test, Ur  Negative     Control Valid                   No orders to display              Procedures  Procedures         ED Course                                             Medical Decision Making  Amount and/or Complexity of Data Reviewed  Labs: ordered.    Risk  OTC drugs.  Prescription drug management.      History Provided by patient    Differential considered: sprain, strain, fracture, dislocation. Declined xray    Patient offered symptomatic treatments. Discharged with PCP / ortho follow up.    The patient was instructed to follow up as documented. Strict return precautions were discussed with the patient and the patient was instructed to return to the emergency department immediately if symptoms worsen. The patient/patient family member acknowledged and were in agreement with plan.            Disposition  Final diagnoses:   Acute pain of right shoulder     Time reflects when diagnosis was documented in both MDM as applicable and the Disposition within this note       Time User Action Codes Description Comment    2/14/2024 10:22 PM Alannah Boyle [M25.511] Acute pain of right shoulder           ED Disposition       ED Disposition   Discharge    Condition   Stable    Date/Time   Wed Feb 14, 2024 10:27 PM    Comment   Kylah Goff discharge to home/self care.                   Follow-up Information       Follow up With Specialties Details Why Contact Info Additional Information    [facility] Schedule an appointment as soon as possible for a visit  For follow up regarding your symptoms and recheck, If symptoms worsen [address]  [place] [address] [place] [id number] [phone] [facility] [place], [facility] [address], [place], [place], [address] [phone]    [facility] [place]  Schedule an appointment as soon as possible for a visit  If symptoms worsen, As needed [facility]  [place] [address]  [phone]             Discharge Medication List as of 2/14/2024 10:28 PM        START taking these medications    Details   methocarbamol (ROBAXIN) 500 mg tablet Take 1 tablet (500 mg total) by mouth 2 (two) times a day, Starting Wed 2/14/2024, Normal      naproxen (NAPROSYN) 500 mg tablet Take 1 tablet (500 mg total) by mouth 2 (two) times a day with meals, Starting Wed 2/14/2024, Normal           CONTINUE these medications which have NOT CHANGED    Details   albuterol (2.5 mg/3 mL) 0.083 % nebulizer solution Take 3 mL (2.5 mg total) by nebulization every 6 (six) hours as needed for wheezing or shortness of breath, Starting Tue 12/12/2023, Normal      albuterol (PROVENTIL HFA,VENTOLIN HFA) 90 mcg/act inhaler Inhale 2 puffs every 6 (six) hours as needed for wheezing, Starting Tue 12/12/2023, Normal      aluminum-magnesium hydroxide-simethicone (MAALOX MAX) 400-400-40 MG/5ML suspension Take 10 mL by mouth every 6 (six) hours as needed for indigestion or heartburn, Starting Tue 1/23/2024, Normal      bisacodyl (FLEET) 10 MG/30ML ENEM Insert 30 mL (10 mg total) into the rectum once for 1 dose, Starting Tue 12/12/2023, Normal      dicyclomine (BENTYL) 10 mg capsule Take 1 capsule (10 mg total) by mouth 4 (four) times a day (before meals and at bedtime) for 7 days, Starting Tue 1/23/2024, Until Tue 1/30/2024, Print      Multiple Vitamin (multivitamin) tablet Take 1 tablet by mouth daily, Historical Med      omeprazole (PriLOSEC) 20 mg delayed release capsule Take 1 capsule (20 mg total) by mouth daily, Starting Wed 2/7/2024, Normal      Senna-Lax 8.6 MG tablet TAKE ONE TABLET BY MOUTH TWICE A DAY AS NEEDED FOR CONSTIPATION, Normal      sucralfate (CARAFATE) 1 g tablet Take 1 tablet (1 g total) by mouth 4 (four) times a day as needed (before meals), Starting Wed 2/7/2024, Normal      triamcinolone (KENALOG) 0.1 % ointment APPLY TOPICALLY TWICE A DAY, Starting Tue  1/16/2024, Normal             No discharge procedures on file.    PDMP Review         Value Time User    PDMP Reviewed  Yes 2/25/2022 12:45 PM Amandeep Case PA-C            ED Provider  Electronically Signed by             Alannah Boyle MD  02/15/24 0547

## 2024-02-16 ENCOUNTER — APPOINTMENT (OUTPATIENT)
Dept: RADIOLOGY | Facility: MEDICAL CENTER | Age: 24
End: 2024-02-16
Payer: OTHER MISCELLANEOUS

## 2024-02-16 ENCOUNTER — OCCMED (OUTPATIENT)
Dept: URGENT CARE | Facility: MEDICAL CENTER | Age: 24
End: 2024-02-16
Payer: OTHER MISCELLANEOUS

## 2024-02-16 DIAGNOSIS — M25.511 ACUTE PAIN OF RIGHT SHOULDER: ICD-10-CM

## 2024-02-16 DIAGNOSIS — M25.511 ACUTE PAIN OF RIGHT SHOULDER: Primary | ICD-10-CM

## 2024-02-16 PROCEDURE — 73030 X-RAY EXAM OF SHOULDER: CPT

## 2024-02-16 PROCEDURE — G0382 LEV 3 HOSP TYPE B ED VISIT: HCPCS

## 2024-02-16 PROCEDURE — 99283 EMERGENCY DEPT VISIT LOW MDM: CPT

## 2024-02-23 ENCOUNTER — APPOINTMENT (OUTPATIENT)
Dept: URGENT CARE | Facility: MEDICAL CENTER | Age: 24
End: 2024-02-23
Payer: OTHER MISCELLANEOUS

## 2024-02-23 PROCEDURE — 99213 OFFICE O/P EST LOW 20 MIN: CPT

## 2024-02-29 ENCOUNTER — APPOINTMENT (OUTPATIENT)
Dept: URGENT CARE | Facility: MEDICAL CENTER | Age: 24
End: 2024-02-29
Payer: OTHER MISCELLANEOUS

## 2024-02-29 PROCEDURE — 99213 OFFICE O/P EST LOW 20 MIN: CPT | Performed by: NURSE PRACTITIONER

## 2024-03-04 DIAGNOSIS — L30.9 ECZEMA, UNSPECIFIED TYPE: ICD-10-CM

## 2024-03-04 RX ORDER — TRIAMCINOLONE ACETONIDE 1 MG/G
1 OINTMENT TOPICAL 2 TIMES DAILY
Qty: 80 G | Refills: 0 | Status: SHIPPED | OUTPATIENT
Start: 2024-03-04

## 2024-03-11 DIAGNOSIS — K59.00 CONSTIPATION, UNSPECIFIED CONSTIPATION TYPE: ICD-10-CM

## 2024-03-11 DIAGNOSIS — R10.84 GENERALIZED ABDOMINAL PAIN: ICD-10-CM

## 2024-03-12 RX ORDER — SENNOSIDES 8.6 MG
TABLET ORAL
Qty: 60 TABLET | Refills: 1 | Status: SHIPPED | OUTPATIENT
Start: 2024-03-12

## 2024-03-12 RX ORDER — SUCRALFATE 1 G/1
TABLET ORAL
Qty: 120 TABLET | Refills: 1 | Status: SHIPPED | OUTPATIENT
Start: 2024-03-12

## 2024-03-12 RX ORDER — OMEPRAZOLE 20 MG/1
20 CAPSULE, DELAYED RELEASE ORAL DAILY
Qty: 30 CAPSULE | Refills: 1 | Status: SHIPPED | OUTPATIENT
Start: 2024-03-12 | End: 2024-03-13 | Stop reason: SDUPTHER

## 2024-03-13 ENCOUNTER — ANESTHESIA EVENT (OUTPATIENT)
Dept: ANESTHESIOLOGY | Facility: HOSPITAL | Age: 24
End: 2024-03-13

## 2024-03-13 ENCOUNTER — ANESTHESIA (OUTPATIENT)
Dept: ANESTHESIOLOGY | Facility: HOSPITAL | Age: 24
End: 2024-03-13

## 2024-03-13 ENCOUNTER — TELEPHONE (OUTPATIENT)
Dept: GASTROENTEROLOGY | Facility: MEDICAL CENTER | Age: 24
End: 2024-03-13

## 2024-03-13 ENCOUNTER — TELEPHONE (OUTPATIENT)
Age: 24
End: 2024-03-13

## 2024-03-13 ENCOUNTER — APPOINTMENT (OUTPATIENT)
Dept: URGENT CARE | Facility: MEDICAL CENTER | Age: 24
End: 2024-03-13
Payer: OTHER MISCELLANEOUS

## 2024-03-13 ENCOUNTER — OFFICE VISIT (OUTPATIENT)
Dept: GASTROENTEROLOGY | Facility: MEDICAL CENTER | Age: 24
End: 2024-03-13
Payer: COMMERCIAL

## 2024-03-13 VITALS
HEART RATE: 76 BPM | HEIGHT: 62 IN | WEIGHT: 135.4 LBS | TEMPERATURE: 98.1 F | DIASTOLIC BLOOD PRESSURE: 82 MMHG | SYSTOLIC BLOOD PRESSURE: 117 MMHG | BODY MASS INDEX: 24.92 KG/M2

## 2024-03-13 DIAGNOSIS — R10.13 EPIGASTRIC PAIN: Primary | ICD-10-CM

## 2024-03-13 DIAGNOSIS — R11.2 NAUSEA AND VOMITING, UNSPECIFIED VOMITING TYPE: ICD-10-CM

## 2024-03-13 DIAGNOSIS — R19.7 DIARRHEA, UNSPECIFIED TYPE: ICD-10-CM

## 2024-03-13 DIAGNOSIS — R10.84 GENERALIZED ABDOMINAL PAIN: ICD-10-CM

## 2024-03-13 DIAGNOSIS — K59.00 CONSTIPATION, UNSPECIFIED CONSTIPATION TYPE: ICD-10-CM

## 2024-03-13 PROCEDURE — 99214 OFFICE O/P EST MOD 30 MIN: CPT | Performed by: NURSE PRACTITIONER

## 2024-03-13 PROCEDURE — 99244 OFF/OP CNSLTJ NEW/EST MOD 40: CPT | Performed by: NURSE PRACTITIONER

## 2024-03-13 RX ORDER — OMEPRAZOLE 20 MG/1
20 CAPSULE, DELAYED RELEASE ORAL DAILY
Qty: 30 CAPSULE | Refills: 3 | Status: SHIPPED | OUTPATIENT
Start: 2024-03-13

## 2024-03-13 NOTE — PROGRESS NOTES
[facility] - Outpatient Consultation  Kylah Goff 24 y.o. female MRN: [id number]  Encounter: [id number]          ASSESSMENT AND PLAN:    Kylah Goff is a 24 y.o. female who presents with complaint of epigastric pain, nausea and vomiting and change in bowel pattern.    1. Change in bowel habits    Has had a longstanding history of chronic constipation.  Reports that her BMs are once a week to once every 2 weeks and is only loose.  She has used enemas in the past.  No recent sick contacts or travel.  No new medications.  No antibiotic use.  Occasional straining to have BM.  Occasional bright red blood on wiping with straining.  Suspect patient has underlying constipation and is having overflow diarrhea.  Will obtain an obstructive series to assess for stool burden.    -Routine labs as noted below  -Obstructive series to assess for stool burden  -Will contact patient with results of obstructive series and further recommendations        2. Upper abdominal pain  3.  Nausea and vomiting  4.  Heartburn    Started several months ago with bouts of epigastric pain, nausea and occasional vomiting.  Cannot pinpoint triggers.  Had a recent CT abdomen pelvis which was normal.  No recent labs.  Does report intermittent bouts of heartburn/reflux.  Does use ibuprofen proxy 1-2 times per week.  No alcohol or caffeine use.  No weight loss.  Reports pain as a cramping.  No aggravating or alleviating factors.  Never had any EGD.  Will rule out celiac disease, H. pylori, PUD, gastritis/esophagitis.  She does have underlying constipation.  This could be adding to some of her symptoms.    -CBC, CMP, TSH, celiac panel, stool for H. Pylori  -Start omeprazole 20 mg daily  -Antireflux diet  -EGD  -follow-up in office after testing    I reviewed with patient/family potential risks of endoscopic evaluation including possible infection, bleeding or perforation.  Patient/family verbalized  understanding of potential risks and agreed to procedure(s).             ______________________________________________________________________    HPI:    Kylah Goff is a 24 y.o. female who presents with complaint of epigastric pain, nausea and vomiting and change in bowel pattern..  She has a past medical history of asthma, eczema, anxiety and depression and GERD. "[employer]" required today for translation as patient only speaks English.     Started several months ago with intermittent bouts of epigastric pain that can occur with or without food.  No radiation.  Reports the pain is a cramping.  Does have intermittent bouts of heartburn/reflux.  Uses ibuprofen once or twice a week.  No caffeine or alcohol use.  Occasionally has nausea again with no triggers.  Will occasionally vomit bile or food at times.  No weight loss.  Never had an EGD.  No weight loss.    Reports change in bowel pattern over the past 2 months.  BMs are once a week to once every 2 weeks and is loose.  She does have an underlying history of chronic constipation.  She has used enemas in the past.  Stools are only loose when she has a BM.  No lower abdominal pain or weight loss.  Does take senna as needed.     Was in the ER 1/23/2024 for epigastric pain and diarrhea for 3 days.  Reported 3 days of intermittent diarrhea and lower abdominal pain.  Denies any melena or hematochezia.  No associated nausea, vomiting.  No sick contacts or suspicious food intake.    CT abdomen pelvis 11/23-no acute findings.  No evidence of bowel obstruction.    No recent labs to review.      REVIEW OF SYSTEMS:    CONSTITUTIONAL: Denies any fever, chills, rigors, and weight loss.  HEENT: No earache or tinnitus. Denies hearing loss or visual disturbances.  CARDIOVASCULAR: No chest pain or palpitations.   RESPIRATORY: Denies any cough, hemoptysis, shortness of breath or dyspnea on exertion.  GASTROINTESTINAL: As noted in the History of Present Illness.    GENITOURINARY: No problems with urination. Denies any hematuria or dysuria.  NEUROLOGIC: No dizziness or vertigo, denies headaches.   MUSCULOSKELETAL: Denies any muscle or joint pain.   SKIN: Denies skin rashes or itching.   ENDOCRINE: Denies excessive thirst. Denies intolerance to heat or cold.  PSYCHOSOCIAL: Denies depression or anxiety. Denies any recent memory loss.       Historical Information   Past Medical History:   Diagnosis Date   • Asthma    • Endometriosis    • GERD (gastroesophageal reflux disease)      Past Surgical History:   Procedure Laterality Date   • LAPAROSCOPY      for endometriosis   • TX EXC B9 LESION MRGN XCP SK TG F/E/E/N/L/M > 4.0CM N/A 2/25/2022    Procedure: EXCISION OF FOREHEAD SUBCUTANEOUS LESION WITH COMPLEX CLOSURE;  Surgeon: Elkin Cade MD;  Location: [facility];  Service: Plastics     Social History   Social History     Substance and Sexual Activity   Alcohol Use Yes    Comment: socially     Social History     Substance and Sexual Activity   Drug Use Never     Social History     Tobacco Use   Smoking Status Former   • Types: Cigarettes   • Passive exposure: Current   Smokeless Tobacco Never   Tobacco Comments    hookah socially      Family History   Problem Relation Age of Onset   • Anxiety disorder Mother    • Depression Mother    • Asthma Mother    • Asthma Brother        Meds/Allergies       Current Outpatient Medications:   •  albuterol (2.5 mg/3 mL) 0.083 % nebulizer solution  •  albuterol (PROVENTIL HFA,VENTOLIN HFA) 90 mcg/act inhaler  •  aluminum-magnesium hydroxide-simethicone (MAALOX MAX) 400-400-40 MG/5ML suspension  •  methocarbamol (ROBAXIN) 500 mg tablet  •  Multiple Vitamin (multivitamin) tablet  •  naproxen (NAPROSYN) 500 mg tablet  •  Natural Senna Laxative 8.6 MG tablet  •  omeprazole (PriLOSEC) 20 mg delayed release capsule  •  sucralfate (CARAFATE) 1 g tablet  •  triamcinolone (KENALOG) 0.1 % ointment  •  bisacodyl (FLEET) 10 MG/30ML ENEM  •  dicyclomine  "(BENTYL) 10 mg capsule    No Known Allergies        Objective     Blood pressure 117/82, pulse 76, temperature 98.1 °F (36.7 °C), temperature source Tympanic, height 5' 2\" (1.575 m), weight 61.4 kg (135 lb 6.4 oz), not currently breastfeeding. Body mass index is 24.76 kg/m².        PHYSICAL EXAM:      General Appearance:   Alert, cooperative, no distress   HEENT:   Normocephalic, atraumatic, anicteric.     Neck:  Supple, symmetrical, trachea midline   Lungs:   Clear to auscultation bilaterally; no rales, rhonchi or wheezing; respirations unlabored    Heart::   Regular rate and rhythm; no murmur, rub, or gallop.   Abdomen:   Soft, non-tender, non-distended; normal bowel sounds; no masses, no organomegaly    Genitalia:   Deferred    Rectal:   Deferred    Extremities:  No cyanosis, clubbing or edema    Pulses:  2+ and symmetric    Skin:  No jaundice, rashes, or lesions    Lymph nodes:  No palpable cervical lymphadenopathy        Lab Results:   No visits with results within 1 Day(s) from this visit.   Latest known visit with results is:   Admission on 02/14/2024, Discharged on 02/14/2024   Component Date Value   • EXT Preg Test, Ur 02/14/2024 Negative    • Control 02/14/2024 Valid        Lab Results   Component Value Date    WBC 7.09 11/03/2023    HGB 14.6 11/03/2023    HCT 44.3 11/03/2023    MCV 93 11/03/2023     11/03/2023       Lab Results   Component Value Date    SODIUM 137 11/03/2023    K 4.0 11/03/2023     11/03/2023    CO2 25 11/03/2023    AGAP 7 11/03/2023    BUN 8 11/03/2023    CREATININE 0.64 11/03/2023    GLUC 85 11/03/2023    GLUF 80 11/21/2018    CALCIUM 9.5 11/03/2023    AST 19 11/03/2023    ALT 14 11/03/2023    ALKPHOS 65 11/03/2023    TP 7.3 11/03/2023    TBILI 0.89 11/03/2023    EGFR 127 11/03/2023       No results found for: \"CRP\"    Lab Results   Component Value Date    YER0SZYAFACE 3.400 11/21/2018       No results found for: \"IRON\", \"TIBC\", \"FERRITIN\"    Radiology Results:   XR " shoulder 2+ vw right    Result Date: 2/17/2024  Narrative: XR SHOULDER 2+ VW RIGHT INDICATION: M25.511: Pain in right shoulder. COMPARISON: None FINDINGS: No acute fracture or dislocation. No significant degenerative changes. No lytic or blastic osseous lesion. Unremarkable soft tissues.     Impression: No acute osseous abnormality. Workstation performed: [id number]

## 2024-03-13 NOTE — TELEPHONE ENCOUNTER
Procedure: EGD  Date: 03/26/2024  Physician performing: Dr. Pinzon  Location of procedure:  [facility]  Instructions given to patient: Yes  Diabetic: No  Clearances: N/A

## 2024-03-14 ENCOUNTER — HOSPITAL ENCOUNTER (OUTPATIENT)
Dept: RADIOLOGY | Facility: HOSPITAL | Age: 24
End: 2024-03-14
Payer: COMMERCIAL

## 2024-03-14 ENCOUNTER — APPOINTMENT (OUTPATIENT)
Dept: LAB | Facility: CLINIC | Age: 24
End: 2024-03-14
Payer: COMMERCIAL

## 2024-03-14 DIAGNOSIS — R10.13 EPIGASTRIC PAIN: ICD-10-CM

## 2024-03-14 DIAGNOSIS — K59.00 CONSTIPATION, UNSPECIFIED CONSTIPATION TYPE: ICD-10-CM

## 2024-03-14 DIAGNOSIS — R11.2 NAUSEA AND VOMITING, UNSPECIFIED VOMITING TYPE: ICD-10-CM

## 2024-03-14 DIAGNOSIS — R19.7 DIARRHEA, UNSPECIFIED TYPE: ICD-10-CM

## 2024-03-14 LAB
ALBUMIN SERPL BCP-MCNC: 4.3 G/DL (ref 3.5–5)
ALP SERPL-CCNC: 62 U/L (ref 34–104)
ALT SERPL W P-5'-P-CCNC: 15 U/L (ref 7–52)
ANION GAP SERPL CALCULATED.3IONS-SCNC: 6 MMOL/L (ref 4–13)
AST SERPL W P-5'-P-CCNC: 18 U/L (ref 13–39)
BASOPHILS # BLD AUTO: 0.06 THOUSANDS/ÂΜL (ref 0–0.1)
BASOPHILS NFR BLD AUTO: 1 % (ref 0–1)
BILIRUB SERPL-MCNC: 1.02 MG/DL (ref 0.2–1)
BUN SERPL-MCNC: 9 MG/DL (ref 5–25)
CALCIUM SERPL-MCNC: 9.1 MG/DL (ref 8.4–10.2)
CHLORIDE SERPL-SCNC: 104 MMOL/L (ref 96–108)
CO2 SERPL-SCNC: 25 MMOL/L (ref 21–32)
CREAT SERPL-MCNC: 0.62 MG/DL (ref 0.6–1.3)
EOSINOPHIL # BLD AUTO: 0.22 THOUSAND/ÂΜL (ref 0–0.61)
EOSINOPHIL NFR BLD AUTO: 3 % (ref 0–6)
ERYTHROCYTE [DISTWIDTH] IN BLOOD BY AUTOMATED COUNT: 12.3 % (ref 11.6–15.1)
GFR SERPL CREATININE-BSD FRML MDRD: 127 ML/MIN/1.73SQ M
GLUCOSE P FAST SERPL-MCNC: 79 MG/DL (ref 65–99)
HCT VFR BLD AUTO: 41.2 % (ref 34.8–46.1)
HGB BLD-MCNC: 14 G/DL (ref 11.5–15.4)
IGA SERPL-MCNC: 181 MG/DL (ref 66–433)
IMM GRANULOCYTES # BLD AUTO: 0.01 THOUSAND/UL (ref 0–0.2)
IMM GRANULOCYTES NFR BLD AUTO: 0 % (ref 0–2)
LYMPHOCYTES # BLD AUTO: 2.09 THOUSANDS/ÂΜL (ref 0.6–4.47)
LYMPHOCYTES NFR BLD AUTO: 32 % (ref 14–44)
MCH RBC QN AUTO: 30.5 PG (ref 26.8–34.3)
MCHC RBC AUTO-ENTMCNC: 34 G/DL (ref 31.4–37.4)
MCV RBC AUTO: 90 FL (ref 82–98)
MONOCYTES # BLD AUTO: 0.6 THOUSAND/ÂΜL (ref 0.17–1.22)
MONOCYTES NFR BLD AUTO: 9 % (ref 4–12)
NEUTROPHILS # BLD AUTO: 3.64 THOUSANDS/ÂΜL (ref 1.85–7.62)
NEUTS SEG NFR BLD AUTO: 55 % (ref 43–75)
NRBC BLD AUTO-RTO: 0 /100 WBCS
PLATELET # BLD AUTO: 226 THOUSANDS/UL (ref 149–390)
PMV BLD AUTO: 12.2 FL (ref 8.9–12.7)
POTASSIUM SERPL-SCNC: 3.8 MMOL/L (ref 3.5–5.3)
PROT SERPL-MCNC: 6.8 G/DL (ref 6.4–8.4)
RBC # BLD AUTO: 4.59 MILLION/UL (ref 3.81–5.12)
SODIUM SERPL-SCNC: 135 MMOL/L (ref 135–147)
TSH SERPL DL<=0.05 MIU/L-ACNC: 1.56 UIU/ML (ref 0.45–4.5)
WBC # BLD AUTO: 6.62 THOUSAND/UL (ref 4.31–10.16)

## 2024-03-14 PROCEDURE — 86364 TISS TRNSGLTMNASE EA IG CLAS: CPT

## 2024-03-14 PROCEDURE — 36415 COLL VENOUS BLD VENIPUNCTURE: CPT

## 2024-03-14 PROCEDURE — 85025 COMPLETE CBC W/AUTO DIFF WBC: CPT

## 2024-03-14 PROCEDURE — 74022 RADEX COMPL AQT ABD SERIES: CPT

## 2024-03-14 PROCEDURE — 80053 COMPREHEN METABOLIC PANEL: CPT

## 2024-03-14 PROCEDURE — 82784 ASSAY IGA/IGD/IGG/IGM EACH: CPT

## 2024-03-14 PROCEDURE — 84443 ASSAY THYROID STIM HORMONE: CPT

## 2024-03-15 ENCOUNTER — HOSPITAL ENCOUNTER (EMERGENCY)
Facility: HOSPITAL | Age: 24
Discharge: HOME | End: 2024-03-15
Attending: EMERGENCY MEDICINE
Payer: COMMERCIAL

## 2024-03-15 VITALS
SYSTOLIC BLOOD PRESSURE: 121 MMHG | BODY MASS INDEX: 24.6 KG/M2 | DIASTOLIC BLOOD PRESSURE: 77 MMHG | TEMPERATURE: 97.6 F | WEIGHT: 134.48 LBS | HEART RATE: 68 BPM | OXYGEN SATURATION: 100 % | RESPIRATION RATE: 20 BRPM

## 2024-03-15 DIAGNOSIS — M25.512 LEFT SHOULDER PAIN, UNSPECIFIED CHRONICITY: Primary | ICD-10-CM

## 2024-03-15 PROCEDURE — 99284 EMERGENCY DEPT VISIT MOD MDM: CPT | Performed by: EMERGENCY MEDICINE

## 2024-03-15 PROCEDURE — 99282 EMERGENCY DEPT VISIT SF MDM: CPT

## 2024-03-15 PROCEDURE — 96372 THER/PROPH/DIAG INJ SC/IM: CPT

## 2024-03-15 RX ORDER — KETOROLAC TROMETHAMINE 30 MG/ML
15 INJECTION, SOLUTION INTRAMUSCULAR; INTRAVENOUS ONCE
Status: COMPLETED | OUTPATIENT
Start: 2024-03-15 | End: 2024-03-15

## 2024-03-15 RX ADMIN — KETOROLAC TROMETHAMINE 15 MG: 30 INJECTION, SOLUTION INTRAMUSCULAR; INTRAVENOUS at 12:24

## 2024-03-15 NOTE — ED PROVIDER NOTES
History  Chief Complaint   Patient presents with    Shoulder Pain     Rt shoulder pain for 1 month     Patient is a 24-year-old [place]ic-speaking female.  Presenting concerns of ongoing right shoulder pain.  She states she still has discomfort despite changing her role at work and not using her arm for activities anymore.  Has not followed up with PCP or had MRI.  Taking over-the-counter medications with minimal relief that last somewhere between 15 and 45 minutes.  No changes in sensation.      Shoulder Pain  Associated symptoms: no fever and no neck pain        Prior to Admission Medications   Prescriptions Last Dose Informant Patient Reported? Taking?   Multiple Vitamin (multivitamin) tablet  Self Yes No   Sig: Take 1 tablet by mouth daily   Natural Senna Laxative 8.6 MG tablet   No No   Sig: TAKE ONE TABLET BY MOUTH TWICE A DAY AS NEEDED FOR CONSTIPATION   albuterol (2.5 mg/3 mL) 0.083 % nebulizer solution   No No   Sig: Take 3 mL (2.5 mg total) by nebulization every 6 (six) hours as needed for wheezing or shortness of breath   albuterol (PROVENTIL HFA,VENTOLIN HFA) 90 mcg/act inhaler   No No   Sig: Inhale 2 puffs every 6 (six) hours as needed for wheezing   aluminum-magnesium hydroxide-simethicone (MAALOX MAX) 400-400-40 MG/5ML suspension   No No   Sig: Take 10 mL by mouth every 6 (six) hours as needed for indigestion or heartburn   bisacodyl (FLEET) 10 MG/30ML ENEM   No No   Sig: Insert 30 mL (10 mg total) into the rectum once for 1 dose   dicyclomine (BENTYL) 10 mg capsule   No No   Sig: Take 1 capsule (10 mg total) by mouth 4 (four) times a day (before meals and at bedtime) for 7 days   methocarbamol (ROBAXIN) 500 mg tablet   No No   Sig: Take 1 tablet (500 mg total) by mouth 2 (two) times a day   naproxen (NAPROSYN) 500 mg tablet   No No   Sig: Take 1 tablet (500 mg total) by mouth 2 (two) times a day with meals   omeprazole (PriLOSEC) 20 mg delayed release capsule   No No   Sig: Take 1 capsule (20 mg total)  by mouth daily   sucralfate (CARAFATE) 1 g tablet   No No   Sig: TAKE ONE TABLET BY MOUTH FOUR TIMES A DAY BEFORE MEALS AS NEEDED   triamcinolone (KENALOG) 0.1 % ointment   No No   Sig: APPLY TOPICALLY TWICE A DAY      Facility-Administered Medications: None       Past Medical History:   Diagnosis Date    Asthma     Endometriosis     GERD (gastroesophageal reflux disease)        Past Surgical History:   Procedure Laterality Date    LAPAROSCOPY      for endometriosis    WV EXC B9 LESION MRGN XCP SK TG F/E/E/N/L/M > 4.0CM N/A 2/25/2022    Procedure: EXCISION OF FOREHEAD SUBCUTANEOUS LESION WITH COMPLEX CLOSURE;  Surgeon: Elkin Cade MD;  Location: [facility];  Service: Plastics       Family History   Problem Relation Age of Onset    Anxiety disorder Mother     Depression Mother     Asthma Mother     Asthma Brother      I have reviewed and agree with the history as documented.    E-Cigarette/Vaping    E-Cigarette Use Current Some Day User     Comments vaping      E-Cigarette/Vaping Substances    Flavoring Yes      Social History     Tobacco Use    Smoking status: Former     Types: Cigarettes     Passive exposure: Current    Smokeless tobacco: Never    Tobacco comments:     hookah socially    Vaping Use    Vaping status: Some Days    Substances: Flavoring   Substance Use Topics    Alcohol use: Yes     Comment: socially    Drug use: Never       Review of Systems   Constitutional: Negative.  Negative for chills and fever.   HENT: Negative.  Negative for rhinorrhea, sore throat, trouble swallowing and voice change.    Eyes: Negative.  Negative for pain and visual disturbance.   Respiratory: Negative.  Negative for cough, shortness of breath and wheezing.    Cardiovascular: Negative.  Negative for chest pain and palpitations.   Gastrointestinal:  Negative for abdominal pain, diarrhea, nausea and vomiting.   Genitourinary: Negative.  Negative for dysuria and frequency.   Musculoskeletal:  Positive for arthralgias.  Negative for neck pain and neck stiffness.   Skin: Negative.  Negative for rash.   Neurological: Negative.  Negative for dizziness, speech difficulty, weakness, light-headedness and numbness.       Physical Exam  Physical Exam  Vitals and nursing note reviewed.   Constitutional:       General: She is not in acute distress.     Appearance: She is well-developed.   HENT:      Head: Normocephalic and atraumatic.   Eyes:      Conjunctiva/sclera: Conjunctivae normal.      Pupils: Pupils are equal, round, and reactive to light.   Neck:      Trachea: No tracheal deviation.   Cardiovascular:      Rate and Rhythm: Normal rate and regular rhythm.   Pulmonary:      Effort: Pulmonary effort is normal. No respiratory distress.      Breath sounds: Normal breath sounds. No wheezing or rales.   Abdominal:      General: Bowel sounds are normal. There is no distension.      Palpations: Abdomen is soft.      Tenderness: There is no abdominal tenderness. There is no guarding or rebound.   Musculoskeletal:         General: No tenderness or deformity. Normal range of motion.      Cervical back: Normal range of motion and neck supple.   Skin:     General: Skin is warm and dry.      Capillary Refill: Capillary refill takes less than 2 seconds.      Findings: No rash.   Neurological:      Mental Status: She is alert and oriented to person, place, and time.   Psychiatric:         Behavior: Behavior normal.         Vital Signs  ED Triage Vitals   Temperature Pulse Respirations Blood Pressure SpO2   03/15/24 1148 03/15/24 1148 03/15/24 1148 03/15/24 1148 03/15/24 1148   97.6 °F (36.4 °C) 68 20 121/77 100 %      Temp Source Heart Rate Source Patient Position - Orthostatic VS BP Location FiO2 (%)   03/15/24 1148 03/15/24 1148 03/15/24 1148 03/15/24 1148 --   Tympanic Monitor Sitting Left arm       Pain Score       03/15/24 1224       9           Vitals:    03/15/24 1148   BP: 121/77   Pulse: 68   Patient Position - Orthostatic VS: Sitting          Visual Acuity      ED Medications  Medications   ketorolac (TORADOL) injection 15 mg (15 mg Intramuscular Given 3/15/24 1224)       Diagnostic Studies  Results Reviewed       None                   No orders to display              Procedures  Procedures         ED Course                                             Medical Decision Making  Patient's a 24-year-old female presenting for ongoing right shoulder pain.  No additional trauma noted.  No evidence of neurovascular compromise noted.  Patient needs follow-up with PCP/orthopedics and possible MRI.  She is updated that she needs to go through her HR department given that this is a work-related injury.    Risk  Prescription drug management.             Disposition  Final diagnoses:   Left shoulder pain, unspecified chronicity     Time reflects when diagnosis was documented in both MDM as applicable and the Disposition within this note       Time User Action Codes Description Comment    3/15/2024 12:19 PM Les Neff Add [M25.512] Left shoulder pain, unspecified chronicity           ED Disposition       ED Disposition   Discharge    Condition   Stable    Date/Time   Fri Mar 15, 2024 12:19 PM    Comment   Kylah Goff discharge to home/self care.                   Follow-up Information       Follow up With Specialties Details Why Contact Info Additional Information    Effie Hernandez PA-C Family Medicine   [address] [place] [id number]  [phone]       [facility]   [address] [place] [id number] [phone] [facility] [address], [place] [address], [place], [place], [id number] [phone]            Discharge Medication List as of 3/15/2024 12:20 PM        CONTINUE these medications which have NOT CHANGED    Details   albuterol (2.5 mg/3 mL) 0.083 % nebulizer solution Take 3 mL (2.5 mg total)  by nebulization every 6 (six) hours as needed for wheezing or shortness of breath, Starting Tue 12/12/2023, Normal      albuterol (PROVENTIL HFA,VENTOLIN HFA) 90 mcg/act inhaler Inhale 2 puffs every 6 (six) hours as needed for wheezing, Starting Tue 12/12/2023, Normal      aluminum-magnesium hydroxide-simethicone (MAALOX MAX) 400-400-40 MG/5ML suspension Take 10 mL by mouth every 6 (six) hours as needed for indigestion or heartburn, Starting Tue 1/23/2024, Normal      bisacodyl (FLEET) 10 MG/30ML ENEM Insert 30 mL (10 mg total) into the rectum once for 1 dose, Starting Tue 12/12/2023, Normal      dicyclomine (BENTYL) 10 mg capsule Take 1 capsule (10 mg total) by mouth 4 (four) times a day (before meals and at bedtime) for 7 days, Starting Tue 1/23/2024, Until Tue 1/30/2024, Print      methocarbamol (ROBAXIN) 500 mg tablet Take 1 tablet (500 mg total) by mouth 2 (two) times a day, Starting Wed 2/14/2024, Normal      Multiple Vitamin (multivitamin) tablet Take 1 tablet by mouth daily, Historical Med      naproxen (NAPROSYN) 500 mg tablet Take 1 tablet (500 mg total) by mouth 2 (two) times a day with meals, Starting Wed 2/14/2024, Normal      Natural Senna Laxative 8.6 MG tablet TAKE ONE TABLET BY MOUTH TWICE A DAY AS NEEDED FOR CONSTIPATION, Normal      omeprazole (PriLOSEC) 20 mg delayed release capsule Take 1 capsule (20 mg total) by mouth daily, Starting Wed 3/13/2024, Normal      sucralfate (CARAFATE) 1 g tablet TAKE ONE TABLET BY MOUTH FOUR TIMES A DAY BEFORE MEALS AS NEEDED, Normal      triamcinolone (KENALOG) 0.1 % ointment APPLY TOPICALLY TWICE A DAY, Starting Mon 3/4/2024, Normal             No discharge procedures on file.    PDMP Review         Value Time User    PDMP Reviewed  Yes 2/25/2022 12:45 PM Amandeep Case PA-C            ED Provider  Electronically Signed by             Les Neff DO  03/15/24 0768

## 2024-03-15 NOTE — Clinical Note
Kylah Goff was seen and treated in our emergency department on 3/15/2024.                Diagnosis:     Kylah  .    She may return on this date: 03/18/2024         If you have any questions or concerns, please don't hesitate to call.      Les Neff, DO    ______________________________           _______________          _______________  Hospital Representative                              Date                                Time

## 2024-03-16 LAB — TTG IGA SER-ACNC: <2 U/ML (ref 0–3)

## 2024-03-20 ENCOUNTER — HOSPITAL ENCOUNTER (EMERGENCY)
Facility: HOSPITAL | Age: 24
Discharge: HOME | End: 2024-03-20
Attending: EMERGENCY MEDICINE | Admitting: EMERGENCY MEDICINE
Payer: COMMERCIAL

## 2024-03-20 VITALS
RESPIRATION RATE: 18 BRPM | WEIGHT: 134.7 LBS | DIASTOLIC BLOOD PRESSURE: 69 MMHG | OXYGEN SATURATION: 99 % | SYSTOLIC BLOOD PRESSURE: 123 MMHG | HEART RATE: 75 BPM | TEMPERATURE: 97.3 F | BODY MASS INDEX: 24.64 KG/M2

## 2024-03-20 DIAGNOSIS — R68.89 FLU-LIKE SYMPTOMS: Primary | ICD-10-CM

## 2024-03-20 LAB
EXT PREGNANCY TEST URINE: NEGATIVE
EXT. CONTROL: NORMAL
FLUAV RNA RESP QL NAA+PROBE: NEGATIVE
FLUBV RNA RESP QL NAA+PROBE: NEGATIVE
RSV RNA RESP QL NAA+PROBE: NEGATIVE
SARS-COV-2 RNA RESP QL NAA+PROBE: NEGATIVE

## 2024-03-20 PROCEDURE — 0241U HB NFCT DS VIR RESP RNA 4 TRGT: CPT | Performed by: EMERGENCY MEDICINE

## 2024-03-20 PROCEDURE — 99284 EMERGENCY DEPT VISIT MOD MDM: CPT | Performed by: EMERGENCY MEDICINE

## 2024-03-20 PROCEDURE — 81025 URINE PREGNANCY TEST: CPT | Performed by: EMERGENCY MEDICINE

## 2024-03-20 PROCEDURE — 99284 EMERGENCY DEPT VISIT MOD MDM: CPT

## 2024-03-20 RX ORDER — ONDANSETRON 4 MG/1
8 TABLET, ORALLY DISINTEGRATING ORAL ONCE
Status: COMPLETED | OUTPATIENT
Start: 2024-03-20 | End: 2024-03-20

## 2024-03-20 RX ADMIN — ONDANSETRON 8 MG: 4 TABLET, ORALLY DISINTEGRATING ORAL at 17:12

## 2024-03-20 NOTE — ED PROVIDER NOTES
HPI: Patient is a 24 y.o. female who presents with 2 days of myalgias, diarrhea, nausea, and vomiting which the patient describes at mild The patient has had contact with people with similar symptoms.  The patient has not taken any medication.    No Known Allergies    Past Medical History:   Diagnosis Date    Asthma     Endometriosis     GERD (gastroesophageal reflux disease)       Past Surgical History:   Procedure Laterality Date    LAPAROSCOPY      for endometriosis    RI EXC B9 LESION MRGN XCP SK TG F/E/E/N/L/M > 4.0CM N/A 2/25/2022    Procedure: EXCISION OF FOREHEAD SUBCUTANEOUS LESION WITH COMPLEX CLOSURE;  Surgeon: Elkin Cade MD;  Location: [facility];  Service: Plastics     Social History     Tobacco Use    Smoking status: Former     Types: Cigarettes     Passive exposure: Current    Smokeless tobacco: Never    Tobacco comments:     hookah socially    Vaping Use    Vaping status: Some Days    Substances: Flavoring   Substance Use Topics    Alcohol use: Yes     Comment: socially    Drug use: Never       Nursing notes reviewed  Physical Exam:  ED Triage Vitals [03/20/24 1702]   Temperature Pulse Respirations Blood Pressure SpO2   (!) 97.3 °F (36.3 °C) 75 18 123/69 99 %      Temp Source Heart Rate Source Patient Position - Orthostatic VS BP Location FiO2 (%)   Tympanic Monitor Sitting Left arm --      Pain Score       --           ROS: Positive for myalgias, diarrhea, nausea, and vomiting ,the remainder of a 10 organ system ROS was otherwise unremarkable.  General: awake, alert, no acute distress    Head: normocephalic, atraumatic    Eyes: no scleral icterus  Ears: external ears normal, hearing grossly intact  Nose: external exam grossly normal, positive nasal discharge  Neck: symmetric, No JVD noted, trachea midline  Pulmonary: no respiratory distress, no tachypnea noted  Cardiovascular: appears well perfused  Abdomen: no distention noted  Musculoskeletal: no deformities noted, tone normal  Neuro: grossly  non-focal  Psych: mood and affect appropriate    The patient is stable and has a history and physical exam consistent with a viral illness. COVID19 testing has been performed.  I considered the patient's other medical conditions as applicable/noted above in my medical decision making.  The patient is stable upon discharge. The plan is for supportive care at home.    The patient (and any family present) verbalized understanding of the discharge instructions and warnings that would necessitate return to the Emergency Department.  All questions were answered prior to discharge.    Pt re-examined and evaluated after testing and treatment. Spoke with the patient and feeling improved and sxs have resolved. Will discharge home with close f/u with pcp and instructed to return to the ED if sxs worsen or continue. Pt agrees with the plan for discharge and feels comfortable to go home with proper f/u. Advised to return for worsening or additional problems. Diagnostic tests were reviewed and questions answered. Diagnosis, care plan and treatment options were discussed. The patient understand instructions and will follow up as directed.    Counseling: I had a detailed discussion with the patient and/or guardian regarding: the historical points, exam findings, and any diagnostic results supporting the discharge diagnosis, lab results, radiology results, discharge instructions reviewed with patient and/or family/caregiver and understanding was verbalized. Instructions given to return to the emergency department if symptoms worsen or persist, or if there are any questions or concerns that arise at home.     All labs reviewed and utilized in the medical decision making process    All radiology studies independently viewed by me and interpreted by the radiologist.      Medications   ondansetron (ZOFRAN-ODT) dispersible tablet 8 mg (8 mg Oral Given 3/20/24 1712)     Final diagnoses:   Flu-like symptoms     Time reflects when diagnosis  was documented in both MDM as applicable and the Disposition within this note       Time User Action Codes Description Comment    3/20/2024  5:20 PM David Briceno [R68.89] Flu-like symptoms           ED Disposition       ED Disposition   Discharge    Condition   Stable    Date/Time   Wed Mar 20, 2024  5:21 PM    Comment   Kylah Goff discharge to home/self care.                   Follow-up Information    None       Discharge Medication List as of 3/20/2024  5:21 PM        CONTINUE these medications which have NOT CHANGED    Details   albuterol (2.5 mg/3 mL) 0.083 % nebulizer solution Take 3 mL (2.5 mg total) by nebulization every 6 (six) hours as needed for wheezing or shortness of breath, Starting Tue 12/12/2023, Normal      albuterol (PROVENTIL HFA,VENTOLIN HFA) 90 mcg/act inhaler Inhale 2 puffs every 6 (six) hours as needed for wheezing, Starting Tue 12/12/2023, Normal      aluminum-magnesium hydroxide-simethicone (MAALOX MAX) 400-400-40 MG/5ML suspension Take 10 mL by mouth every 6 (six) hours as needed for indigestion or heartburn, Starting Tue 1/23/2024, Normal      bisacodyl (FLEET) 10 MG/30ML ENEM Insert 30 mL (10 mg total) into the rectum once for 1 dose, Starting Tue 12/12/2023, Normal      dicyclomine (BENTYL) 10 mg capsule Take 1 capsule (10 mg total) by mouth 4 (four) times a day (before meals and at bedtime) for 7 days, Starting Tue 1/23/2024, Until Tue 1/30/2024, Print      methocarbamol (ROBAXIN) 500 mg tablet Take 1 tablet (500 mg total) by mouth 2 (two) times a day, Starting Wed 2/14/2024, Normal      Multiple Vitamin (multivitamin) tablet Take 1 tablet by mouth daily, Historical Med      naproxen (NAPROSYN) 500 mg tablet Take 1 tablet (500 mg total) by mouth 2 (two) times a day with meals, Starting Wed 2/14/2024, Normal      Natural Senna Laxative 8.6 MG tablet TAKE ONE TABLET BY MOUTH TWICE A DAY AS NEEDED FOR CONSTIPATION, Normal      omeprazole (PriLOSEC) 20 mg delayed  release capsule Take 1 capsule (20 mg total) by mouth daily, Starting Wed 3/13/2024, Normal      sucralfate (CARAFATE) 1 g tablet TAKE ONE TABLET BY MOUTH FOUR TIMES A DAY BEFORE MEALS AS NEEDED, Normal      triamcinolone (KENALOG) 0.1 % ointment APPLY TOPICALLY TWICE A DAY, Starting Mon 3/4/2024, Normal           No discharge procedures on file.    Electronically Signed by        David Briceno,   03/20/24 6075

## 2024-03-20 NOTE — Clinical Note
Kylah Goff was seen and treated in our emergency department on 3/20/2024.                Diagnosis:     Kylah  may return to work on return date.    She may return on this date: 03/22/2024         If you have any questions or concerns, please don't hesitate to call.      David Briceno, DO    ______________________________           _______________          _______________  Hospital Representative                              Date                                Time

## 2024-03-22 ENCOUNTER — TELEPHONE (OUTPATIENT)
Age: 24
End: 2024-03-22

## 2024-03-22 NOTE — TELEPHONE ENCOUNTER
Pt was at the ED waiting for MD to reached out to her -GI dept RESEND EGD instructions in German to her mailing address

## 2024-03-25 ENCOUNTER — ANESTHESIA (OUTPATIENT)
Dept: ANESTHESIOLOGY | Facility: HOSPITAL | Age: 24
End: 2024-03-25

## 2024-03-25 ENCOUNTER — APPOINTMENT (OUTPATIENT)
Dept: LAB | Facility: CLINIC | Age: 24
End: 2024-03-25
Payer: COMMERCIAL

## 2024-03-25 ENCOUNTER — ANESTHESIA EVENT (OUTPATIENT)
Dept: ANESTHESIOLOGY | Facility: HOSPITAL | Age: 24
End: 2024-03-25

## 2024-03-25 DIAGNOSIS — R10.13 EPIGASTRIC PAIN: ICD-10-CM

## 2024-03-25 DIAGNOSIS — R11.2 NAUSEA AND VOMITING, UNSPECIFIED VOMITING TYPE: ICD-10-CM

## 2024-03-25 PROBLEM — K21.9 GASTROESOPHAGEAL REFLUX DISEASE: Status: ACTIVE | Noted: 2024-03-25

## 2024-03-25 PROCEDURE — 87338 HPYLORI STOOL AG IA: CPT

## 2024-03-25 RX ORDER — SODIUM CHLORIDE 9 MG/ML
125 INJECTION, SOLUTION INTRAVENOUS CONTINUOUS
Status: CANCELLED | OUTPATIENT
Start: 2024-03-25

## 2024-03-25 RX ORDER — ONDANSETRON 2 MG/ML
4 INJECTION INTRAMUSCULAR; INTRAVENOUS EVERY 6 HOURS PRN
Status: CANCELLED | OUTPATIENT
Start: 2024-03-25

## 2024-03-25 NOTE — ANESTHESIA PREPROCEDURE EVALUATION
Procedure:  PRE-OP ONLY    Relevant Problems   ANESTHESIA (within normal limits)      CARDIO (within normal limits)      ENDO (within normal limits)      GI/HEPATIC   (+) Gastroesophageal reflux disease      /RENAL (within normal limits)      GYN (within normal limits)      HEMATOLOGY (within normal limits)      MUSCULOSKELETAL (within normal limits)      NEURO/PSYCH   (+) Anxiety and depression      PULMONARY   (+) Mild intermittent asthma without complication             Anesthesia Plan  ASA Score- 2     Anesthesia Type- IV sedation with anesthesia with ASA Monitors.         Additional Monitors:     Airway Plan:            Plan Factors-Exercise tolerance (METS): >4 METS.    Chart reviewed.    Patient summary reviewed.    Patient is not a current smoker.              Induction- intravenous.    Postoperative Plan-     Informed Consent- Anesthetic plan and risks discussed with patient.

## 2024-03-26 ENCOUNTER — ANESTHESIA EVENT (OUTPATIENT)
Dept: GASTROENTEROLOGY | Facility: MEDICAL CENTER | Age: 24
End: 2024-03-26

## 2024-03-26 ENCOUNTER — ANESTHESIA (OUTPATIENT)
Dept: GASTROENTEROLOGY | Facility: MEDICAL CENTER | Age: 24
End: 2024-03-26

## 2024-03-26 ENCOUNTER — HOSPITAL ENCOUNTER (OUTPATIENT)
Dept: GASTROENTEROLOGY | Facility: MEDICAL CENTER | Age: 24
Setting detail: OUTPATIENT SURGERY
Discharge: HOME | End: 2024-03-26
Payer: COMMERCIAL

## 2024-03-26 VITALS
WEIGHT: 134 LBS | OXYGEN SATURATION: 100 % | TEMPERATURE: 97.5 F | SYSTOLIC BLOOD PRESSURE: 114 MMHG | HEIGHT: 62 IN | BODY MASS INDEX: 24.66 KG/M2 | RESPIRATION RATE: 18 BRPM | HEART RATE: 74 BPM | DIASTOLIC BLOOD PRESSURE: 79 MMHG

## 2024-03-26 DIAGNOSIS — R11.2 NAUSEA AND VOMITING, UNSPECIFIED VOMITING TYPE: ICD-10-CM

## 2024-03-26 DIAGNOSIS — R10.13 EPIGASTRIC PAIN: ICD-10-CM

## 2024-03-26 LAB
EXT PREGNANCY TEST URINE: NEGATIVE
EXT. CONTROL: NORMAL

## 2024-03-26 PROCEDURE — 88305 TISSUE EXAM BY PATHOLOGIST: CPT | Performed by: PATHOLOGY

## 2024-03-26 PROCEDURE — 81025 URINE PREGNANCY TEST: CPT | Performed by: ANESTHESIOLOGY

## 2024-03-26 RX ORDER — PROPOFOL 10 MG/ML
INJECTION, EMULSION INTRAVENOUS AS NEEDED
Status: DISCONTINUED | OUTPATIENT
Start: 2024-03-26 | End: 2024-03-26

## 2024-03-26 RX ORDER — KETAMINE HCL IN NACL, ISO-OSM 100MG/10ML
SYRINGE (ML) INJECTION AS NEEDED
Status: DISCONTINUED | OUTPATIENT
Start: 2024-03-26 | End: 2024-03-26

## 2024-03-26 RX ORDER — SODIUM CHLORIDE 9 MG/ML
125 INJECTION, SOLUTION INTRAVENOUS CONTINUOUS
Status: DISCONTINUED | OUTPATIENT
Start: 2024-03-26 | End: 2024-03-30 | Stop reason: HOSPADM

## 2024-03-26 RX ORDER — LIDOCAINE HYDROCHLORIDE 20 MG/ML
INJECTION, SOLUTION EPIDURAL; INFILTRATION; INTRACAUDAL; PERINEURAL AS NEEDED
Status: DISCONTINUED | OUTPATIENT
Start: 2024-03-26 | End: 2024-03-26

## 2024-03-26 RX ORDER — MIDAZOLAM HYDROCHLORIDE 2 MG/2ML
INJECTION, SOLUTION INTRAMUSCULAR; INTRAVENOUS AS NEEDED
Status: DISCONTINUED | OUTPATIENT
Start: 2024-03-26 | End: 2024-03-26

## 2024-03-26 RX ADMIN — MIDAZOLAM 2 MG: 1 INJECTION INTRAMUSCULAR; INTRAVENOUS at 14:31

## 2024-03-26 RX ADMIN — Medication 20 MG: at 14:33

## 2024-03-26 RX ADMIN — PROPOFOL 150 MG: 10 INJECTION, EMULSION INTRAVENOUS at 14:35

## 2024-03-26 RX ADMIN — LIDOCAINE HYDROCHLORIDE 80 MG: 20 INJECTION, SOLUTION EPIDURAL; INFILTRATION; INTRACAUDAL at 14:35

## 2024-03-26 RX ADMIN — SODIUM CHLORIDE: 0.9 INJECTION, SOLUTION INTRAVENOUS at 14:06

## 2024-03-26 NOTE — ANESTHESIA PREPROCEDURE EVALUATION
Procedure:  EGD    Relevant Problems   GI/HEPATIC   (+) Gastroesophageal reflux disease      NEURO/PSYCH   (+) Anxiety and depression      PULMONARY   (+) Mild intermittent asthma without complication        Physical Exam    Airway    Mallampati score: II    Neck ROM: full     Dental   No notable dental hx     Cardiovascular  Rhythm: regular, Rate: normal, Cardiovascular exam normal    Pulmonary  Pulmonary exam normal Breath sounds clear to auscultation    Other Findings  post-pubertal.      Anesthesia Plan  ASA Score- 2     Anesthesia Type- IV sedation with anesthesia with ASA Monitors.         Additional Monitors:     Airway Plan:            Plan Factors-Exercise tolerance (METS): >4 METS.    Chart reviewed.    Patient summary reviewed.    Patient is not a current smoker.              Induction- intravenous.    Postoperative Plan-     Informed Consent- Anesthetic plan and risks discussed with patient.

## 2024-03-26 NOTE — ANESTHESIA POSTPROCEDURE EVALUATION
"Post-Op Assessment Note    CV Status:  Stable    Pain management: adequate       Mental Status:  Alert and awake   Hydration Status:  Euvolemic   PONV Controlled:  Controlled   Airway Patency:  Patent     Post Op Vitals Reviewed: Yes    No anethesia notable event occurred.    Staff: Anesthesiologist               BP      Temp      Pulse     Resp      SpO2      /79   Pulse 74   Temp 97.5 °F (36.4 °C) (Temporal)   Resp 18   Ht 5' 2\" (1.575 m)   Wt 60.8 kg (134 lb)   LMP  (LMP Unknown) Comment: states she is irregular  SpO2 100%   BMI 24.51 kg/m²     "

## 2024-03-26 NOTE — H&P
History and Physical - [facility]  Kylah Goff 24 y.o. female MRN: [id number]                  HPI: Kylah Goff is a 24 y.o. year old female who presents for nausea and vomiting      REVIEW OF SYSTEMS: Per the HPI, and otherwise unremarkable.    Historical Information   Past Medical History:   Diagnosis Date    Asthma     Endometriosis     GERD (gastroesophageal reflux disease)      Past Surgical History:   Procedure Laterality Date    LAPAROSCOPY      for endometriosis    TN EXC B9 LESION MRGN XCP SK TG F/E/E/N/L/M > 4.0CM N/A 2/25/2022    Procedure: EXCISION OF FOREHEAD SUBCUTANEOUS LESION WITH COMPLEX CLOSURE;  Surgeon: Elkin Cade MD;  Location[facility];  Service: Plastics     Social History   Social History     Substance and Sexual Activity   Alcohol Use Yes    Comment: socially     Social History     Substance and Sexual Activity   Drug Use Never     Social History     Tobacco Use   Smoking Status Former    Types: Cigarettes    Passive exposure: Current   Smokeless Tobacco Never   Tobacco Comments    hookah socially      Family History   Problem Relation Age of Onset    Anxiety disorder Mother     Depression Mother     Asthma Mother     Asthma Brother        Meds/Allergies       Current Outpatient Medications:     albuterol (PROVENTIL HFA,VENTOLIN HFA) 90 mcg/act inhaler    omeprazole (PriLOSEC) 20 mg delayed release capsule    sucralfate (CARAFATE) 1 g tablet    albuterol (2.5 mg/3 mL) 0.083 % nebulizer solution    aluminum-magnesium hydroxide-simethicone (MAALOX MAX) 400-400-40 MG/5ML suspension    bisacodyl (FLEET) 10 MG/30ML ENEM    dicyclomine (BENTYL) 10 mg capsule    methocarbamol (ROBAXIN) 500 mg tablet    Multiple Vitamin (multivitamin) tablet    naproxen (NAPROSYN) 500 mg tablet    Natural Senna Laxative 8.6 MG tablet    triamcinolone (KENALOG) 0.1 % ointment    Current Facility-Administered Medications:     sodium chloride 0.9 % infusion, 125  "mL/hr, Intravenous, Continuous    No Known Allergies    Objective     /79   Pulse 74   Temp 97.5 °F (36.4 °C) (Temporal)   Resp 18   Ht 5' 2\" (1.575 m)   Wt 60.8 kg (134 lb)   LMP  (LMP Unknown) Comment: states she is irregular  SpO2 100%   BMI 24.51 kg/m²       PHYSICAL EXAM    Gen: NAD  Head: NCAT  CV: RRR  CHEST: Clear  ABD: soft, NT/ND  EXT: no edema      ASSESSMENT/PLAN:  This is a 24 y.o. year old female here for nausea and vomiting, and she is stable and optimized for her procedure.       "

## 2024-03-27 LAB — H PYLORI AG STL QL IA: POSITIVE

## 2024-03-28 PROCEDURE — 88305 TISSUE EXAM BY PATHOLOGIST: CPT | Performed by: PATHOLOGY

## 2024-04-01 ENCOUNTER — HOSPITAL ENCOUNTER (EMERGENCY)
Facility: HOSPITAL | Age: 24
Discharge: HOME | End: 2024-04-01
Attending: EMERGENCY MEDICINE | Admitting: EMERGENCY MEDICINE
Payer: COMMERCIAL

## 2024-04-01 VITALS
SYSTOLIC BLOOD PRESSURE: 130 MMHG | RESPIRATION RATE: 20 BRPM | BODY MASS INDEX: 24.87 KG/M2 | WEIGHT: 136 LBS | TEMPERATURE: 98.4 F | DIASTOLIC BLOOD PRESSURE: 87 MMHG | OXYGEN SATURATION: 100 % | HEART RATE: 89 BPM

## 2024-04-01 DIAGNOSIS — L30.9 ECZEMA, UNSPECIFIED TYPE: ICD-10-CM

## 2024-04-01 DIAGNOSIS — K59.00 CONSTIPATION: Primary | ICD-10-CM

## 2024-04-01 DIAGNOSIS — K62.5 RECTAL BLEEDING: ICD-10-CM

## 2024-04-01 PROCEDURE — 99283 EMERGENCY DEPT VISIT LOW MDM: CPT

## 2024-04-01 PROCEDURE — 99283 EMERGENCY DEPT VISIT LOW MDM: CPT | Performed by: EMERGENCY MEDICINE

## 2024-04-01 RX ORDER — DOCUSATE SODIUM 100 MG/1
100 CAPSULE, LIQUID FILLED ORAL EVERY 12 HOURS
Qty: 60 CAPSULE | Refills: 0 | Status: SHIPPED | OUTPATIENT
Start: 2024-04-01

## 2024-04-01 RX ORDER — MAGNESIUM CARB/ALUMINUM HYDROX 105-160MG
296 TABLET,CHEWABLE ORAL ONCE
Qty: 296 ML | Refills: 0 | Status: SHIPPED | OUTPATIENT
Start: 2024-04-01 | End: 2024-04-01

## 2024-04-01 NOTE — Clinical Note
Kylah Goff was seen and treated in our emergency department on 4/1/2024.                Diagnosis:     Kylah  .    She may return on this date: 04/03/2024         If you have any questions or concerns, please don't hesitate to call.      Les Neff, DO    ______________________________           _______________          _______________  Hospital Representative                              Date                                Time

## 2024-04-02 ENCOUNTER — TELEPHONE (OUTPATIENT)
Dept: FAMILY MEDICINE CLINIC | Facility: CLINIC | Age: 24
End: 2024-04-02

## 2024-04-02 DIAGNOSIS — A04.8 HELICOBACTER PYLORI INFECTION: Primary | ICD-10-CM

## 2024-04-02 RX ORDER — METRONIDAZOLE 250 MG/1
TABLET ORAL
Qty: 56 TABLET | Refills: 0 | Status: SHIPPED | OUTPATIENT
Start: 2024-04-02 | End: 2024-04-16

## 2024-04-02 RX ORDER — TRIAMCINOLONE ACETONIDE 1 MG/G
OINTMENT TOPICAL
Qty: 80 G | Refills: 0 | Status: SHIPPED | OUTPATIENT
Start: 2024-04-02

## 2024-04-02 RX ORDER — TETRACYCLINE HYDROCHLORIDE 500 MG/1
CAPSULE ORAL
Qty: 56 CAPSULE | Refills: 0 | Status: SHIPPED | OUTPATIENT
Start: 2024-04-02 | End: 2024-04-16

## 2024-04-02 RX ORDER — OMEPRAZOLE 20 MG/1
20 CAPSULE, DELAYED RELEASE ORAL EVERY 12 HOURS
Qty: 28 CAPSULE | Refills: 0 | Status: SHIPPED | OUTPATIENT
Start: 2024-04-02 | End: 2024-04-16

## 2024-04-02 RX ORDER — BISMUTH SUBSALICYLATE 262 MG/1
TABLET, CHEWABLE ORAL
Qty: 56 TABLET | Refills: 0 | Status: SHIPPED | OUTPATIENT
Start: 2024-04-02

## 2024-04-02 NOTE — TELEPHONE ENCOUNTER
Patient with H-Pylori.Patient states she is not taking the Omeprazole daily.Please send scripts to the pharmacy.

## 2024-04-02 NOTE — ED PROVIDER NOTES
History  Chief Complaint   Patient presents with    Abdominal Pain     Via , pt c/o not having a bowel movement for 1 week, pt denies abdominal pain     24-year-old female, Green[place]ic-speaking, states that she did not go to bathroom for a week then had a painful bowel movement associate with rectal bleeding.  Last menstrual cycle was approximately 2 weeks ago.  No vaginal discharge or bleeding.  No dysuria or frequency.      Abdominal Pain  Associated symptoms: no chest pain, no chills, no cough, no diarrhea, no dysuria, no fever, no nausea, no shortness of breath, no sore throat and no vomiting        Prior to Admission Medications   Prescriptions Last Dose Informant Patient Reported? Taking?   Multiple Vitamin (multivitamin) tablet  Self Yes No   Sig: Take 1 tablet by mouth daily   Natural Senna Laxative 8.6 MG tablet   No No   Sig: TAKE ONE TABLET BY MOUTH TWICE A DAY AS NEEDED FOR CONSTIPATION   albuterol (2.5 mg/3 mL) 0.083 % nebulizer solution   No No   Sig: Take 3 mL (2.5 mg total) by nebulization every 6 (six) hours as needed for wheezing or shortness of breath   albuterol (PROVENTIL HFA,VENTOLIN HFA) 90 mcg/act inhaler   No No   Sig: Inhale 2 puffs every 6 (six) hours as needed for wheezing   aluminum-magnesium hydroxide-simethicone (MAALOX MAX) 400-400-40 MG/5ML suspension   No No   Sig: Take 10 mL by mouth every 6 (six) hours as needed for indigestion or heartburn   bisacodyl (FLEET) 10 MG/30ML ENEM   No No   Sig: Insert 30 mL (10 mg total) into the rectum once for 1 dose   dicyclomine (BENTYL) 10 mg capsule   No No   Sig: Take 1 capsule (10 mg total) by mouth 4 (four) times a day (before meals and at bedtime) for 7 days   methocarbamol (ROBAXIN) 500 mg tablet   No No   Sig: Take 1 tablet (500 mg total) by mouth 2 (two) times a day   naproxen (NAPROSYN) 500 mg tablet   No No   Sig: Take 1 tablet (500 mg total) by mouth 2 (two) times a day with meals   omeprazole (PriLOSEC) 20 mg delayed release  capsule   No No   Sig: Take 1 capsule (20 mg total) by mouth daily   sucralfate (CARAFATE) 1 g tablet   No No   Sig: TAKE ONE TABLET BY MOUTH FOUR TIMES A DAY BEFORE MEALS AS NEEDED   triamcinolone (KENALOG) 0.1 % ointment   No No   Sig: APPLY TOPICALLY TWICE A DAY      Facility-Administered Medications: None       Past Medical History:   Diagnosis Date    Asthma     Endometriosis     GERD (gastroesophageal reflux disease)        Past Surgical History:   Procedure Laterality Date    LAPAROSCOPY      for endometriosis    NH EXC B9 LESION MRGN XCP SK TG F/E/E/N/L/M > 4.0CM N/A 2/25/2022    Procedure: EXCISION OF FOREHEAD SUBCUTANEOUS LESION WITH COMPLEX CLOSURE;  Surgeon: Elkin Cade MD;  Location: [facility];  Service: Plastics       Family History   Problem Relation Age of Onset    Anxiety disorder Mother     Depression Mother     Asthma Mother     Asthma Brother      I have reviewed and agree with the history as documented.    E-Cigarette/Vaping    E-Cigarette Use Current Some Day User     Comments vaping      E-Cigarette/Vaping Substances    Flavoring Yes      Social History     Tobacco Use    Smoking status: Former     Types: Cigarettes     Passive exposure: Current    Smokeless tobacco: Never    Tobacco comments:     hookah socially    Vaping Use    Vaping status: Some Days    Substances: Flavoring   Substance Use Topics    Alcohol use: Not Currently    Drug use: Never       Review of Systems   Constitutional: Negative.  Negative for chills and fever.   HENT: Negative.  Negative for rhinorrhea, sore throat, trouble swallowing and voice change.    Eyes: Negative.  Negative for pain and visual disturbance.   Respiratory: Negative.  Negative for cough, shortness of breath and wheezing.    Cardiovascular: Negative.  Negative for chest pain and palpitations.   Gastrointestinal:  Positive for abdominal pain and anal bleeding. Negative for diarrhea, nausea and vomiting.   Genitourinary: Negative.  Negative for  dysuria and frequency.   Musculoskeletal: Negative.  Negative for neck pain and neck stiffness.   Skin: Negative.  Negative for rash.   Neurological: Negative.  Negative for dizziness, speech difficulty, weakness, light-headedness and numbness.       Physical Exam  Physical Exam  Vitals and nursing note reviewed.   Constitutional:       General: She is not in acute distress.     Appearance: She is well-developed.   HENT:      Head: Normocephalic and atraumatic.   Eyes:      Conjunctiva/sclera: Conjunctivae normal.      Pupils: Pupils are equal, round, and reactive to light.   Neck:      Trachea: No tracheal deviation.   Cardiovascular:      Rate and Rhythm: Normal rate and regular rhythm.   Pulmonary:      Effort: Pulmonary effort is normal. No respiratory distress.      Breath sounds: Normal breath sounds. No wheezing or rales.   Abdominal:      General: Bowel sounds are normal. There is no distension.      Palpations: Abdomen is soft.      Tenderness: There is no abdominal tenderness. There is no guarding or rebound.   Musculoskeletal:         General: No tenderness or deformity. Normal range of motion.      Cervical back: Normal range of motion and neck supple.   Skin:     General: Skin is warm and dry.      Capillary Refill: Capillary refill takes less than 2 seconds.      Findings: No rash.   Neurological:      Mental Status: She is alert and oriented to person, place, and time.   Psychiatric:         Behavior: Behavior normal.         Vital Signs  ED Triage Vitals [04/01/24 1935]   Temperature Pulse Respirations Blood Pressure SpO2   98.4 °F (36.9 °C) 89 20 130/87 100 %      Temp Source Heart Rate Source Patient Position - Orthostatic VS BP Location FiO2 (%)   Tympanic Monitor Sitting Left arm --      Pain Score       --           Vitals:    04/01/24 1935   BP: 130/87   Pulse: 89   Patient Position - Orthostatic VS: Sitting         Visual Acuity      ED Medications  Medications - No data to  display    Diagnostic Studies  Results Reviewed       Procedure Component Value Units Date/Time    POCT pregnancy, urine [[id number]]     Lab Status: No result                    No orders to display              Procedures  Procedures         ED Course                               SBIRT 20yo+      Flowsheet Row Most Recent Value   Initial Alcohol Screen: US AUDIT-C     2. How many drinks containing alcohol do you have on a typical day you are drinking?  0 Filed at: 04/01/2024 1940   3b. FEMALE Any Age, or MALE 65+: How often do you have 4 or more drinks on one occassion? 0 Filed at: 04/01/2024 1940   Audit-C Score 0 Filed at: 04/01/2024 1940   KARYNA: How many times in the past year have you...    Used an illegal drug or used a prescription medication for non-medical reasons? Never Filed at: 04/01/2024 1940                      Medical Decision Making     Reviewed past medical records: Yes, no recent hospitalizations noted     History Provided by: The patient via      Differential considered: Appendicitis, constipation, hemorrhoids     Consideration of tests: Patient declined  exam in the ED.  Based on history he likely had rectal irritation/small tears secondary to constipation and hard stool.  Will place on stool softener, enemas and laxative.  Reviewed dietary changes.  Patient to follow-up with PCP and GI       I have reviewed the patient's visit and any testing done in the emergency department.  They have verbalized their understanding of any testing done today and have no further questions or concerns regarding their care in the emergency room.  They will follow up with their primary care physician as well as with any specialist in their discharge instructions.  Strict return precautions were discussed.    Amount and/or Complexity of Data Reviewed  Labs: ordered.    Risk  OTC drugs.             Disposition  Final diagnoses:   Constipation   Rectal bleeding     Time reflects when diagnosis was  documented in both MDM as applicable and the Disposition within this note       Time User Action Codes Description Comment    4/1/2024  8:04 PM Les Neff [K59.00] Constipation     4/1/2024  8:04 PM Les Neff [K62.5] Rectal bleeding           ED Disposition       ED Disposition   Discharge    Condition   Stable    Date/Time   Mon Apr 1, 2024 2004    Comment   Kylah [place] discharge to home/self care.                   Follow-up Information       Follow up With Specialties Details Why Contact Info Additional Information    Effie Hernandez PA-C Family Medicine   [address] [place] [address]  [facility] [id number]  [phone]       [facility]   [address]  [address]  [facility] [address] [address] [phone] [facility] [place], [facility] [address], [place], [place], [address] [phone]            Discharge Medication List as of 4/1/2024  8:06 PM        START taking these medications    Details   docusate sodium (COLACE) 100 mg capsule Take 1 capsule (100 mg total) by mouth every 12 (twelve) hours, Starting Mon 4/1/2024, Normal      magnesium citrate (CITROMA) 1.745 g/30 mL oral solution Take 296 mL by mouth once for 1 dose, Starting Mon 4/1/2024, Normal           CONTINUE these medications which have CHANGED    Details   bisacodyl (FLEET) 10 MG/30ML ENEM Insert 30 mL (10 mg total) into the rectum once for 1 dose, Starting Mon 4/1/2024, Normal           CONTINUE these medications which have NOT CHANGED    Details   albuterol (2.5 mg/3 mL) 0.083 % nebulizer solution Take 3 mL (2.5 mg total) by nebulization every 6 (six) hours as needed for wheezing or shortness of breath, Starting Tue 12/12/2023, Normal      albuterol (PROVENTIL HFA,VENTOLIN HFA) 90 mcg/act inhaler Inhale 2 puffs every 6 (six) hours as needed for wheezing, Starting Tue 12/12/2023, Normal       aluminum-magnesium hydroxide-simethicone (MAALOX MAX) 400-400-40 MG/5ML suspension Take 10 mL by mouth every 6 (six) hours as needed for indigestion or heartburn, Starting Tue 1/23/2024, Normal      dicyclomine (BENTYL) 10 mg capsule Take 1 capsule (10 mg total) by mouth 4 (four) times a day (before meals and at bedtime) for 7 days, Starting Tue 1/23/2024, Until Tue 1/30/2024, Print      methocarbamol (ROBAXIN) 500 mg tablet Take 1 tablet (500 mg total) by mouth 2 (two) times a day, Starting Wed 2/14/2024, Normal      Multiple Vitamin (multivitamin) tablet Take 1 tablet by mouth daily, Historical Med      naproxen (NAPROSYN) 500 mg tablet Take 1 tablet (500 mg total) by mouth 2 (two) times a day with meals, Starting Wed 2/14/2024, Normal      Natural Senna Laxative 8.6 MG tablet TAKE ONE TABLET BY MOUTH TWICE A DAY AS NEEDED FOR CONSTIPATION, Normal      omeprazole (PriLOSEC) 20 mg delayed release capsule Take 1 capsule (20 mg total) by mouth daily, Starting Wed 3/13/2024, Normal      sucralfate (CARAFATE) 1 g tablet TAKE ONE TABLET BY MOUTH FOUR TIMES A DAY BEFORE MEALS AS NEEDED, Normal      triamcinolone (KENALOG) 0.1 % ointment APPLY TOPICALLY TWICE A DAY, Starting Mon 3/4/2024, Normal             No discharge procedures on file.    PDMP Review         Value Time User    PDMP Reviewed  Yes 2/25/2022 12:45 PM Amandeep Case PA-C            ED Provider  Electronically Signed by             Les Neff DO  04/01/24 0218

## 2024-04-03 ENCOUNTER — TELEPHONE (OUTPATIENT)
Age: 24
End: 2024-04-03

## 2024-04-03 NOTE — TELEPHONE ENCOUNTER
Patients GI provider:  ANJU ANDREA    Number to return call: [facility] [phone]    Reason for call: [facility] contacted office requesting prior authorization for Tetracycline. Please initiate prior authorization.

## 2024-04-03 NOTE — TELEPHONE ENCOUNTER
PA for tetracycline    Submitted via    [x]CMM-KEY [id number]  []Surescripts-Case ID #   []Faxed to plan   []Other website   []Phone call Case ID #     Office notes sent, clinical questions answered. Awaiting determination    Turnaround time for your insurance to make a decision on your Prior Authorization can take 7-21 business days.

## 2024-04-10 ENCOUNTER — OFFICE VISIT (OUTPATIENT)
Dept: FAMILY MEDICINE CLINIC | Facility: CLINIC | Age: 24
End: 2024-04-10

## 2024-04-10 VITALS
WEIGHT: 136.6 LBS | HEIGHT: 62 IN | HEART RATE: 70 BPM | BODY MASS INDEX: 25.14 KG/M2 | OXYGEN SATURATION: 99 % | DIASTOLIC BLOOD PRESSURE: 62 MMHG | TEMPERATURE: 97.6 F | SYSTOLIC BLOOD PRESSURE: 114 MMHG | RESPIRATION RATE: 18 BRPM

## 2024-04-10 DIAGNOSIS — A04.8 H. PYLORI INFECTION: Primary | ICD-10-CM

## 2024-04-10 DIAGNOSIS — L30.9 ECZEMA, UNSPECIFIED TYPE: ICD-10-CM

## 2024-04-10 DIAGNOSIS — K59.00 CONSTIPATION, UNSPECIFIED CONSTIPATION TYPE: ICD-10-CM

## 2024-04-10 DIAGNOSIS — J45.20 MILD INTERMITTENT ASTHMA WITHOUT COMPLICATION: ICD-10-CM

## 2024-04-10 RX ORDER — ALBUTEROL SULFATE 90 UG/1
2 AEROSOL, METERED RESPIRATORY (INHALATION) EVERY 6 HOURS PRN
Qty: 18 G | Refills: 1 | Status: SHIPPED | OUTPATIENT
Start: 2024-04-10

## 2024-04-10 RX ORDER — ALBUTEROL SULFATE 2.5 MG/3ML
2.5 SOLUTION RESPIRATORY (INHALATION) EVERY 6 HOURS PRN
Qty: 90 ML | Refills: 2 | Status: SHIPPED | OUTPATIENT
Start: 2024-04-10

## 2024-04-10 NOTE — LETTER
April 10, 2024     Patient: Kylah Goff  YOB: 2000  Date of Visit: 4/10/2024      To Whom it May Concern:    Kylah Goff is under my professional care. Kylah was seen in my office on 4/10/2024. Please excuse patient from work on 3/28/24.     If you have any questions or concerns, please don't hesitate to call.         Sincerely,          Effie Hernandez PA-C        CC: No Recipients

## 2024-04-10 NOTE — PROGRESS NOTES
Assessment/Plan:    Constipation  - Uncontrolled.   - Continue senna, colace as needed, biscodyl fleet enema as needed.   - Continue follow up with GI as scheduled.     H. pylori infection  - Continue quadruple therapy treatment as prescribed through gastroenterology.  - Continue follow-up with gastroenterologist scheduled.    Mild intermittent asthma without complication  - Stable. Continue albuterol inhaler as needed. Will refill.   - Will refill nebulizer solution.     Eczema  - Continue hydrocortisone cream for maintenance therapy. Continue triamcinolone 0.1% ointment twice daily as needed for flares.  - Continue using moisturizer.      Diagnoses and all orders for this visit:    H. pylori infection    Constipation, unspecified constipation type    Mild intermittent asthma without complication  -     albuterol (PROVENTIL HFA,VENTOLIN HFA) 90 mcg/act inhaler; Inhale 2 puffs every 6 (six) hours as needed for wheezing  -     albuterol (2.5 mg/3 mL) 0.083 % nebulizer solution; Take 3 mL (2.5 mg total) by nebulization every 6 (six) hours as needed for wheezing or shortness of breath    Eczema, unspecified type  -     hydrocortisone 2.5 % cream; Apply topically 2 (two) times a day          All of patients questions were answered. Patient understands and agrees with the above plan.     Return in about 1 month (around 5/10/2024) for Next scheduled follow up constipation.    Effie Hernandez PA-C  04/10/24  [facility]LES Stovall          Subjective:     Patient ID: Kylah Goff  is a 24 y.o. female with known PMH of asthma, constipation who presents today in office for constipation follow up.     - Patient is a 24 y.o. female who presents today for constipation follow up.  Patient did establish with gastroenterology and underwent stool study which was positive for H. pylori.  Patient also underwent EGD which showed biopsy positive for H. pylori.  Patient recently started taking quadruple treatment therapy.  Patient  "notes she does continue with constipation.  Patient notes she has been taking 3 different medications for constipation but is unsure of their names.      The following portions of the patient's history were reviewed and updated as appropriate: allergies, current medications, past family history, past medical history, past social history, past surgical history, and problem list.        Review of Systems   Constitutional:  Negative for appetite change and fever.   HENT:  Negative for congestion and sore throat.    Eyes:  Negative for pain.   Respiratory:  Negative for cough, chest tightness and shortness of breath.    Cardiovascular:  Negative for chest pain and palpitations.   Gastrointestinal:  Positive for abdominal pain and constipation. Negative for blood in stool, diarrhea, nausea and vomiting.   Genitourinary:  Negative for difficulty urinating and dysuria.   Musculoskeletal:  Negative for arthralgias.   Neurological:  Negative for dizziness and headaches.   Psychiatric/Behavioral:  The patient is not nervous/anxious.                  Objective:   Vitals:    04/10/24 0905   BP: 114/62   BP Location: Right arm   Patient Position: Sitting   Cuff Size: Standard   Pulse: 70   Resp: 18   Temp: 97.6 °F (36.4 °C)   TempSrc: Temporal   SpO2: 99%   Weight: 62 kg (136 lb 9.6 oz)   Height: 5' 2\" (1.575 m)         Physical Exam  Vitals and nursing note reviewed.   Constitutional:       General: She is not in acute distress.     Appearance: She is well-developed.   HENT:      Head: Normocephalic and atraumatic.      Right Ear: External ear normal.      Left Ear: External ear normal.      Nose: Nose normal.   Eyes:      Conjunctiva/sclera: Conjunctivae normal.   Cardiovascular:      Rate and Rhythm: Normal rate and regular rhythm.      Pulses: Normal pulses.      Heart sounds: Normal heart sounds.   Pulmonary:      Effort: Pulmonary effort is normal. No respiratory distress.      Breath sounds: Normal breath sounds. No " wheezing.   Abdominal:      General: Bowel sounds are normal.      Palpations: Abdomen is soft.      Tenderness: There is no abdominal tenderness.   Musculoskeletal:      Cervical back: Normal range of motion and neck supple.   Skin:     General: Skin is warm and dry.   Neurological:      Mental Status: She is alert and oriented to person, place, and time.   Psychiatric:         Behavior: Behavior normal.           - Utilized [employer] services for translation.

## 2024-04-10 NOTE — LETTER
April 10, 2024     Patient: Kylah Goff  YOB: 2000  Date of Visit: 4/10/2024      To Whom it May Concern:    Kylah Goff is under my professional care. Kylah was seen in my office on 4/10/2024. Kylah {Return to school/sport/work:[id number]}. Please excuse patient from work on 3/28/24    If you have any questions or concerns, please don't hesitate to call.         Sincerely,          Effie Hernandez PA-C        CC:   No Recipients

## 2024-04-11 NOTE — ASSESSMENT & PLAN NOTE
- Uncontrolled.   - Continue senna, colace as needed, biscodyl fleet enema as needed.   - Continue follow up with GI as scheduled.

## 2024-04-11 NOTE — ASSESSMENT & PLAN NOTE
- Continue quadruple therapy treatment as prescribed through gastroenterology.  - Continue follow-up with gastroenterologist scheduled.

## 2024-04-11 NOTE — ASSESSMENT & PLAN NOTE
- Continue hydrocortisone cream for maintenance therapy. Continue triamcinolone 0.1% ointment twice daily as needed for flares.  - Continue using moisturizer.

## 2024-04-12 NOTE — TELEPHONE ENCOUNTER
Called pt to advise tetracycline has been approved by the insurance. Your pharmacy has been made aware of your approval, please call them to see when your medication will be available for .    []Spoke with pt. Verbal understanding  []LMOM   []L/M to call office back. Communication consent not updated in chart  [x]Other - no answer; no voicemail

## 2024-04-15 ENCOUNTER — APPOINTMENT (OUTPATIENT)
Dept: URGENT CARE | Facility: MEDICAL CENTER | Age: 24
End: 2024-04-15
Payer: OTHER MISCELLANEOUS

## 2024-04-15 PROCEDURE — 99213 OFFICE O/P EST LOW 20 MIN: CPT | Performed by: NURSE PRACTITIONER

## 2024-04-29 DIAGNOSIS — K59.00 CONSTIPATION, UNSPECIFIED CONSTIPATION TYPE: ICD-10-CM

## 2024-04-29 DIAGNOSIS — R10.84 GENERALIZED ABDOMINAL PAIN: ICD-10-CM

## 2024-04-29 DIAGNOSIS — L30.9 ECZEMA, UNSPECIFIED TYPE: ICD-10-CM

## 2024-04-29 RX ORDER — TRIAMCINOLONE ACETONIDE 1 MG/G
OINTMENT TOPICAL
Qty: 80 G | Refills: 0 | Status: SHIPPED | OUTPATIENT
Start: 2024-04-29

## 2024-04-29 RX ORDER — SUCRALFATE 1 G/1
TABLET ORAL
Qty: 120 TABLET | Refills: 0 | Status: SHIPPED | OUTPATIENT
Start: 2024-04-29

## 2024-04-29 RX ORDER — SENNOSIDES 8.6 MG
TABLET ORAL
Qty: 60 TABLET | Refills: 0 | Status: SHIPPED | OUTPATIENT
Start: 2024-04-29

## 2024-04-30 ENCOUNTER — APPOINTMENT (OUTPATIENT)
Dept: URGENT CARE | Facility: MEDICAL CENTER | Age: 24
End: 2024-04-30
Payer: OTHER MISCELLANEOUS

## 2024-04-30 PROCEDURE — 99213 OFFICE O/P EST LOW 20 MIN: CPT | Performed by: NURSE PRACTITIONER

## 2024-05-06 DIAGNOSIS — R10.84 GENERALIZED ABDOMINAL PAIN: ICD-10-CM

## 2024-05-09 RX ORDER — OMEPRAZOLE 20 MG/1
20 CAPSULE, DELAYED RELEASE ORAL DAILY
Qty: 30 CAPSULE | Refills: 0 | Status: SHIPPED | OUTPATIENT
Start: 2024-05-09

## 2024-05-18 DIAGNOSIS — L30.9 ECZEMA, UNSPECIFIED TYPE: ICD-10-CM

## 2024-05-18 DIAGNOSIS — J45.20 MILD INTERMITTENT ASTHMA WITHOUT COMPLICATION: ICD-10-CM

## 2024-05-19 ENCOUNTER — HOSPITAL ENCOUNTER (EMERGENCY)
Facility: HOSPITAL | Age: 24
Discharge: HOME | End: 2024-05-19
Attending: EMERGENCY MEDICINE | Admitting: EMERGENCY MEDICINE
Payer: COMMERCIAL

## 2024-05-19 VITALS
HEART RATE: 66 BPM | DIASTOLIC BLOOD PRESSURE: 73 MMHG | SYSTOLIC BLOOD PRESSURE: 121 MMHG | BODY MASS INDEX: 24.89 KG/M2 | WEIGHT: 136.1 LBS | TEMPERATURE: 97.9 F | RESPIRATION RATE: 20 BRPM | OXYGEN SATURATION: 100 %

## 2024-05-19 DIAGNOSIS — R19.7 DIARRHEA, UNSPECIFIED TYPE: Primary | ICD-10-CM

## 2024-05-19 PROCEDURE — 99283 EMERGENCY DEPT VISIT LOW MDM: CPT

## 2024-05-19 PROCEDURE — 99284 EMERGENCY DEPT VISIT MOD MDM: CPT | Performed by: EMERGENCY MEDICINE

## 2024-05-19 PROCEDURE — 96372 THER/PROPH/DIAG INJ SC/IM: CPT

## 2024-05-19 RX ORDER — LOPERAMIDE HYDROCHLORIDE 2 MG/1
2 CAPSULE ORAL ONCE
Status: COMPLETED | OUTPATIENT
Start: 2024-05-19 | End: 2024-05-19

## 2024-05-19 RX ORDER — DIPHENOXYLATE HYDROCHLORIDE AND ATROPINE SULFATE 2.5; .025 MG/1; MG/1
1 TABLET ORAL 4 TIMES DAILY PRN
Qty: 12 TABLET | Refills: 0 | Status: SHIPPED | OUTPATIENT
Start: 2024-05-19 | End: 2024-05-29

## 2024-05-19 RX ORDER — KETOROLAC TROMETHAMINE 30 MG/ML
15 INJECTION, SOLUTION INTRAMUSCULAR; INTRAVENOUS ONCE
Status: COMPLETED | OUTPATIENT
Start: 2024-05-19 | End: 2024-05-19

## 2024-05-19 RX ORDER — IBUPROFEN 800 MG/1
800 TABLET ORAL 3 TIMES DAILY
Qty: 21 TABLET | Refills: 0 | Status: SHIPPED | OUTPATIENT
Start: 2024-05-19

## 2024-05-19 RX ORDER — DICYCLOMINE HCL 20 MG
20 TABLET ORAL ONCE
Status: COMPLETED | OUTPATIENT
Start: 2024-05-19 | End: 2024-05-19

## 2024-05-19 RX ADMIN — LOPERAMIDE HYDROCHLORIDE 2 MG: 2 CAPSULE ORAL at 15:14

## 2024-05-19 RX ADMIN — KETOROLAC TROMETHAMINE 15 MG: 30 INJECTION, SOLUTION INTRAMUSCULAR; INTRAVENOUS at 15:14

## 2024-05-19 RX ADMIN — DICYCLOMINE HYDROCHLORIDE 20 MG: 20 TABLET ORAL at 15:14

## 2024-05-19 NOTE — ED PROVIDER NOTES
History  Chief Complaint   Patient presents with    Diarrhea     Diarrhea and cramping since yesterday     Patient is a 24-year-old female with 2 complaints today.  1. Is multiple episodes of diarrhea since last night.  2 yesterday, 4 today.  No dark or tarry stool.  No meeds.  Denies any questionable exposure.  No n/v.  2.  Having typical lower abdominal pain.  H/o endometriosis and s/p ex lap 2 years ago.  Currently on period and has crampy lower abdominal pain, typical pain for her, non radiating.  No relief with otc meds.          Prior to Admission Medications   Prescriptions Last Dose Informant Patient Reported? Taking?   Multiple Vitamin (multivitamin) tablet  Self Yes No   Sig: Take 1 tablet by mouth daily   Natural Senna Laxative 8.6 MG tablet   No No   Sig: TAKE ONE TABLET BY MOUTH TWICE A DAY FOR CONSTIPATION AS NEEDED   albuterol (2.5 mg/3 mL) 0.083 % nebulizer solution   No No   Sig: Take 3 mL (2.5 mg total) by nebulization every 6 (six) hours as needed for wheezing or shortness of breath   albuterol (PROVENTIL HFA,VENTOLIN HFA) 90 mcg/act inhaler   No No   Sig: Inhale 2 puffs every 6 (six) hours as needed for wheezing   aluminum-magnesium hydroxide-simethicone (MAALOX MAX) 400-400-40 MG/5ML suspension   No No   Sig: Take 10 mL by mouth every 6 (six) hours as needed for indigestion or heartburn   bisacodyl (FLEET) 10 MG/30ML ENEM   No No   Sig: Insert 30 mL (10 mg total) into the rectum once for 1 dose   bisacodyl (FLEET) 10 MG/30ML ENEM   No No   Sig: Insert 30 mL (10 mg total) into the rectum once for 1 dose   bismuth subsalicylate (PEPTO BISMOL) 262 MG chewable tablet   No No   Sig: Take 1 tablet by mouth every 6 hours for 14 days   dicyclomine (BENTYL) 10 mg capsule   No No   Sig: Take 1 capsule (10 mg total) by mouth 4 (four) times a day (before meals and at bedtime) for 7 days   docusate sodium (COLACE) 100 mg capsule   No No   Sig: Take 1 capsule (100 mg total) by mouth every 12 (twelve) hours    hydrocortisone 2.5 % cream   No No   Sig: Apply topically 2 (two) times a day   magnesium citrate (CITROMA) 1.745 g/30 mL oral solution   No No   Sig: Take 296 mL by mouth once for 1 dose   methocarbamol (ROBAXIN) 500 mg tablet   No No   Sig: Take 1 tablet (500 mg total) by mouth 2 (two) times a day   naproxen (NAPROSYN) 500 mg tablet   No No   Sig: Take 1 tablet (500 mg total) by mouth 2 (two) times a day with meals   omeprazole (PriLOSEC) 20 mg delayed release capsule   No No   Sig: Take 1 capsule (20 mg total) by mouth daily   omeprazole (PriLOSEC) 20 mg delayed release capsule   No No   Sig: Take 1 capsule (20 mg total) by mouth every 12 (twelve) hours for 14 days   omeprazole (PriLOSEC) 20 mg delayed release capsule   No No   Sig: TAKE ONE CAPSULE BY MOUTH ONCE DAILY   sucralfate (CARAFATE) 1 g tablet   No No   Sig: TAKE ONE TABLET BY MOUTH FOUR TIMES A DAY BEFORE MEALS AS NEEDED   triamcinolone (KENALOG) 0.1 % ointment   No No   Sig: APPLY TOPICALLY TO AFFECTED AREA TWICE A DAY      Facility-Administered Medications: None       Past Medical History:   Diagnosis Date    Asthma     Endometriosis     GERD (gastroesophageal reflux disease)        Past Surgical History:   Procedure Laterality Date    LAPAROSCOPY      for endometriosis    ME EXC B9 LESION MRGN XCP SK TG F/E/E/N/L/M > 4.0CM N/A 2/25/2022    Procedure: EXCISION OF FOREHEAD SUBCUTANEOUS LESION WITH COMPLEX CLOSURE;  Surgeon: Elkin Cade MD;  Location: [facility];  Service: Plastics       Family History   Problem Relation Age of Onset    Anxiety disorder Mother     Depression Mother     Asthma Mother     Asthma Brother      I have reviewed and agree with the history as documented.    E-Cigarette/Vaping    E-Cigarette Use Current Some Day User     Comments vaping      E-Cigarette/Vaping Substances    Flavoring Yes      Social History     Tobacco Use    Smoking status: Former     Types: Cigarettes     Passive exposure: Current    Smokeless tobacco:  Never    Tobacco comments:     hookah socially    Vaping Use    Vaping status: Some Days    Substances: Flavoring   Substance Use Topics    Alcohol use: Not Currently    Drug use: Never       Review of Systems   Constitutional: Negative.    HENT: Negative.     Eyes: Negative.    Respiratory: Negative.     Cardiovascular: Negative.    Gastrointestinal:  Positive for abdominal pain and diarrhea.   Endocrine: Negative.    Genitourinary: Negative.    Musculoskeletal: Negative.    Skin: Negative.    Allergic/Immunologic: Negative.    Neurological: Negative.    Hematological: Negative.    Psychiatric/Behavioral: Negative.     All other systems reviewed and are negative.      Physical Exam  Physical Exam  Vitals and nursing note reviewed.   Constitutional:       Appearance: Normal appearance. She is normal weight.   HENT:      Head: Normocephalic and atraumatic.      Nose: Nose normal.      Mouth/Throat:      Mouth: Mucous membranes are moist.      Pharynx: Oropharynx is clear.   Cardiovascular:      Rate and Rhythm: Normal rate and regular rhythm.      Pulses: Normal pulses.      Heart sounds: Normal heart sounds.   Pulmonary:      Effort: Pulmonary effort is normal.      Breath sounds: Normal breath sounds.   Abdominal:      General: Bowel sounds are normal.      Palpations: Abdomen is soft. There is no mass.      Tenderness: There is abdominal tenderness in the suprapubic area. There is no right CVA tenderness, left CVA tenderness, guarding or rebound.      Hernia: No hernia is present.   Musculoskeletal:         General: Normal range of motion.      Cervical back: Normal range of motion and neck supple.   Skin:     General: Skin is warm and dry.      Capillary Refill: Capillary refill takes less than 2 seconds.   Neurological:      General: No focal deficit present.      Mental Status: She is alert and oriented to person, place, and time.   Psychiatric:         Mood and Affect: Mood normal.         Behavior: Behavior  normal.         Vital Signs  ED Triage Vitals   Temperature Pulse Respirations Blood Pressure SpO2   05/19/24 1457 05/19/24 1457 05/19/24 1457 05/19/24 1457 05/19/24 1457   97.9 °F (36.6 °C) 66 20 121/73 100 %      Temp Source Heart Rate Source Patient Position - Orthostatic VS BP Location FiO2 (%)   05/19/24 1457 05/19/24 1457 05/19/24 1457 05/19/24 1457 --   Oral Monitor Sitting Left arm       Pain Score       05/19/24 1514       10 - Worst Possible Pain           Vitals:    05/19/24 1457   BP: 121/73   Pulse: 66   Patient Position - Orthostatic VS: Sitting         Visual Acuity      ED Medications  Medications   ketorolac (TORADOL) injection 15 mg (15 mg Intramuscular Given 5/19/24 1514)   dicyclomine (BENTYL) tablet 20 mg (20 mg Oral Given 5/19/24 1514)   loperamide (IMODIUM) capsule 2 mg (2 mg Oral Given 5/19/24 1514)       Diagnostic Studies  Results Reviewed       None                   No orders to display              Procedures  Procedures         ED Course                               SBIRT 20yo+      Flowsheet Row Most Recent Value   Initial Alcohol Screen: US AUDIT-C     1. How often do you have a drink containing alcohol? 0 Filed at: 05/19/2024 1457   2. How many drinks containing alcohol do you have on a typical day you are drinking?  0 Filed at: 05/19/2024 1457   3a. Male UNDER 65: How often do you have five or more drinks on one occasion? 0 Filed at: 05/19/2024 1457   3b. FEMALE Any Age, or MALE 65+: How often do you have 4 or more drinks on one occassion? 0 Filed at: 05/19/2024 1457   Audit-C Score 0 Filed at: 05/19/2024 1457   KARYNA: How many times in the past year have you...    Used an illegal drug or used a prescription medication for non-medical reasons? Never Filed at: 05/19/2024 1457                      Medical Decision Making  Problems Addressed:  Diarrhea, unspecified type:     Details: Acute onset.  Vss.  No clinical signs of dehydration.      Risk  Prescription drug management.              Disposition  Final diagnoses:   Diarrhea, unspecified type     Time reflects when diagnosis was documented in both MDM as applicable and the Disposition within this note       Time User Action Codes Description Comment    5/19/2024  3:11 PM Jeremy Go Add [R19.7] Diarrhea, unspecified type           ED Disposition       ED Disposition   Discharge    Condition   Stable    Date/Time   Sun May 19, 2024 1511    Comment   Kylah Goff discharge to home/self care.                   Follow-up Information       Follow up With Specialties Details Why Contact Info    Effie Hernandez PA-C Family Medicine   [address] [place] [id number]  [phone]              Patient's Medications   Discharge Prescriptions    DIPHENOXYLATE-ATROPINE (LOMOTIL) 2.5-0.025 MG PER TABLET    Take 1 tablet by mouth 4 (four) times a day as needed for diarrhea for up to 10 days       Start Date: 5/19/2024 End Date: 5/29/2024       Order Dose: 1 tablet       Quantity: 12 tablet    Refills: 0    IBUPROFEN (MOTRIN) 800 MG TABLET    Take 1 tablet (800 mg total) by mouth 3 (three) times a day       Start Date: 5/19/2024 End Date: --       Order Dose: 800 mg       Quantity: 21 tablet    Refills: 0       No discharge procedures on file.    PDMP Review         Value Time User    PDMP Reviewed  Yes 2/25/2022 12:45 PM Amandeep Case PA-C            ED Provider  Electronically Signed by             Jeremy Go MD  05/19/24 8299

## 2024-05-19 NOTE — Clinical Note
Kylah Goff was seen and treated in our emergency department on 5/19/2024.                Diagnosis:     Kylah  may return to work on return date.    She may return on this date: 05/21/2024         If you have any questions or concerns, please don't hesitate to call.      Jeremy Go MD    ______________________________           _______________          _______________  Hospital Representative                              Date                                Time

## 2024-05-20 RX ORDER — ALBUTEROL SULFATE 90 UG/1
AEROSOL, METERED RESPIRATORY (INHALATION)
Qty: 6.7 G | Refills: 0 | Status: SHIPPED | OUTPATIENT
Start: 2024-05-20

## 2024-05-20 RX ORDER — TRIAMCINOLONE ACETONIDE 1 MG/G
OINTMENT TOPICAL
Qty: 80 G | Refills: 0 | Status: SHIPPED | OUTPATIENT
Start: 2024-05-20

## 2024-05-25 DIAGNOSIS — K59.00 CONSTIPATION, UNSPECIFIED CONSTIPATION TYPE: ICD-10-CM

## 2024-05-25 DIAGNOSIS — R10.84 GENERALIZED ABDOMINAL PAIN: ICD-10-CM

## 2024-05-28 RX ORDER — SENNOSIDES 8.6 MG
TABLET ORAL
Qty: 60 TABLET | Refills: 0 | Status: SHIPPED | OUTPATIENT
Start: 2024-05-28

## 2024-05-28 RX ORDER — SUCRALFATE 1 G/1
TABLET ORAL
Qty: 120 TABLET | Refills: 0 | Status: SHIPPED | OUTPATIENT
Start: 2024-05-28

## 2024-06-08 DIAGNOSIS — J45.20 MILD INTERMITTENT ASTHMA WITHOUT COMPLICATION: ICD-10-CM

## 2024-06-09 ENCOUNTER — HOSPITAL ENCOUNTER (EMERGENCY)
Facility: HOSPITAL | Age: 24
Discharge: HOME | End: 2024-06-09
Attending: EMERGENCY MEDICINE
Payer: COMMERCIAL

## 2024-06-09 VITALS
OXYGEN SATURATION: 97 % | HEART RATE: 68 BPM | DIASTOLIC BLOOD PRESSURE: 68 MMHG | BODY MASS INDEX: 24.23 KG/M2 | WEIGHT: 132.5 LBS | RESPIRATION RATE: 17 BRPM | TEMPERATURE: 97.6 F | SYSTOLIC BLOOD PRESSURE: 114 MMHG

## 2024-06-09 DIAGNOSIS — R19.7 DIARRHEA: Primary | ICD-10-CM

## 2024-06-09 PROCEDURE — 99283 EMERGENCY DEPT VISIT LOW MDM: CPT | Performed by: EMERGENCY MEDICINE

## 2024-06-09 PROCEDURE — 99283 EMERGENCY DEPT VISIT LOW MDM: CPT

## 2024-06-09 RX ORDER — LOPERAMIDE HYDROCHLORIDE 2 MG/1
2 CAPSULE ORAL 4 TIMES DAILY PRN
Qty: 12 CAPSULE | Refills: 0 | Status: SHIPPED | OUTPATIENT
Start: 2024-06-09

## 2024-06-09 RX ORDER — LOPERAMIDE HYDROCHLORIDE 2 MG/1
2 CAPSULE ORAL ONCE
Status: COMPLETED | OUTPATIENT
Start: 2024-06-09 | End: 2024-06-09

## 2024-06-09 RX ADMIN — LOPERAMIDE HYDROCHLORIDE 2 MG: 2 CAPSULE ORAL at 16:02

## 2024-06-09 NOTE — ED PROVIDER NOTES
History  Chief Complaint   Patient presents with    Abdominal Pain     Patient reports umbilical pain, some diarrhea. Denies vomiting.      24-year-old female presenting to the emergency department with periumbilical pain that is intermittent over the past few months along with diarrhea episodes also intermittent over the past few months.  Day before yesterday had some vomiting.  Diarrhea since yesterday no vomiting any longer.  Nonbloody nonbilious emesis.  Nonbloody diarrhea.        Prior to Admission Medications   Prescriptions Last Dose Informant Patient Reported? Taking?   Natural Senna Laxative 8.6 MG tablet   No No   Sig: TAKE ONE TABLET BY MOUTH TWICE A DAY FOR CONSTIPATION AS NEEDED   albuterol (2.5 mg/3 mL) 0.083 % nebulizer solution   No No   Sig: Take 3 mL (2.5 mg total) by nebulization every 6 (six) hours as needed for wheezing or shortness of breath   albuterol (PROVENTIL HFA,VENTOLIN HFA) 90 mcg/act inhaler   No No   Sig: INHALE 2 PUFFS BY MOUTH EVERY SIX HOURS AS NEEDED FOR WHEEZING   bisacodyl (FLEET) 10 MG/30ML ENEM   No No   Sig: Insert 30 mL (10 mg total) into the rectum once for 1 dose   hydrocortisone 2.5 % cream   No No   Sig: Apply topically 2 (two) times a day   ibuprofen (MOTRIN) 800 mg tablet   No No   Sig: Take 1 tablet (800 mg total) by mouth 3 (three) times a day   omeprazole (PriLOSEC) 20 mg delayed release capsule   No No   Sig: Take 1 capsule (20 mg total) by mouth daily   sucralfate (CARAFATE) 1 g tablet   No No   Sig: TAKE ONE TABLET BY MOUTH FOUR TIMES A DAY BEFORE MEAL AS NEEDED   triamcinolone (KENALOG) 0.1 % ointment   No No   Sig: APPLY TOPICALLY TO AFFECTED AREA TWICE A DAY      Facility-Administered Medications: None       Past Medical History:   Diagnosis Date    Asthma     Endometriosis     GERD (gastroesophageal reflux disease)        Past Surgical History:   Procedure Laterality Date    LAPAROSCOPY      for endometriosis    KS EXC B9 LESION MRGN XCP SK TG F/E/E/N/L/M >  4.0CM N/A 2/25/2022    Procedure: EXCISION OF FOREHEAD SUBCUTANEOUS LESION WITH COMPLEX CLOSURE;  Surgeon: Elkin Cade MD;  Location: [facility];  Service: Plastics       Family History   Problem Relation Age of Onset    Anxiety disorder Mother     Depression Mother     Asthma Mother     Asthma Brother      I have reviewed and agree with the history as documented.    E-Cigarette/Vaping    E-Cigarette Use Current Some Day User     Comments vaping      E-Cigarette/Vaping Substances    Flavoring Yes      Social History     Tobacco Use    Smoking status: Former     Types: Cigarettes     Passive exposure: Current    Smokeless tobacco: Never    Tobacco comments:     hookah socially    Vaping Use    Vaping status: Some Days    Substances: Flavoring   Substance Use Topics    Alcohol use: Not Currently    Drug use: Never       Review of Systems   Constitutional:  Negative for chills and fever.   HENT:  Negative for ear pain and sore throat.    Eyes:  Negative for pain and visual disturbance.   Respiratory:  Negative for cough and shortness of breath.    Cardiovascular:  Negative for chest pain and palpitations.   Gastrointestinal:  Positive for abdominal pain, diarrhea, nausea and vomiting.   Genitourinary:  Negative for dysuria and hematuria.   Musculoskeletal:  Negative for arthralgias and back pain.   Skin:  Negative for color change and rash.   Neurological:  Negative for seizures and syncope.   All other systems reviewed and are negative.      Physical Exam  Physical Exam  Vitals and nursing note reviewed.   Constitutional:       General: She is not in acute distress.     Appearance: She is well-developed.   HENT:      Head: Normocephalic and atraumatic.   Eyes:      Conjunctiva/sclera: Conjunctivae normal.   Cardiovascular:      Rate and Rhythm: Normal rate and regular rhythm.      Heart sounds: No murmur heard.  Pulmonary:      Effort: Pulmonary effort is normal. No respiratory distress.      Breath sounds: Normal  breath sounds.   Abdominal:      Palpations: Abdomen is soft.      Tenderness: There is no abdominal tenderness.   Musculoskeletal:         General: No swelling.      Cervical back: Neck supple.   Skin:     General: Skin is warm and dry.      Capillary Refill: Capillary refill takes less than 2 seconds.   Neurological:      Mental Status: She is alert.   Psychiatric:         Mood and Affect: Mood normal.         Vital Signs  ED Triage Vitals [06/09/24 1543]   Temperature Pulse Respirations Blood Pressure SpO2   97.6 °F (36.4 °C) 68 17 114/68 97 %      Temp Source Heart Rate Source Patient Position - Orthostatic VS BP Location FiO2 (%)   Oral Monitor Sitting Left arm --      Pain Score       --           Vitals:    06/09/24 1543   BP: 114/68   Pulse: 68   Patient Position - Orthostatic VS: Sitting         Visual Acuity      ED Medications  Medications   loperamide (IMODIUM) capsule 2 mg (2 mg Oral Given 6/9/24 1602)       Diagnostic Studies  Results Reviewed       None                   No orders to display              Procedures  Procedures         ED Course                               SBIRT 22yo+      Flowsheet Row Most Recent Value   Initial Alcohol Screen: US AUDIT-C     1. How often do you have a drink containing alcohol? 0 Filed at: 06/09/2024 1547   2. How many drinks containing alcohol do you have on a typical day you are drinking?  0 Filed at: 06/09/2024 1547   3a. Male UNDER 65: How often do you have five or more drinks on one occasion? 0 Filed at: 06/09/2024 1547   3b. FEMALE Any Age, or MALE 65+: How often do you have 4 or more drinks on one occassion? 0 Filed at: 06/09/2024 1547   Audit-C Score 0 Filed at: 06/09/2024 1547   KARYNA: How many times in the past year have you...    Used an illegal drug or used a prescription medication for non-medical reasons? Never Filed at: 06/09/2024 1547                      Medical Decision Making  Well-appearing 24-year-old female with intermittent episodes of  diarrhea.  Appears well-hydrated.  Will give loperamide.  Patient needs a colonoscopy, has not had one in the past.  Patient did see GI in the past and got a EGD.  Has a follow-up appointment in August, recommended she follow-up with GI.    Risk  Prescription drug management.             Disposition  Final diagnoses:   Diarrhea     Time reflects when diagnosis was documented in both MDM as applicable and the Disposition within this note       Time User Action Codes Description Comment    6/9/2024  4:00 PM Lauren Mcleod Add [R19.7] Diarrhea           ED Disposition       ED Disposition   Discharge    Condition   Stable    Date/Time   Sun Jun 9, 2024 1600    Comment   Kylah Goff discharge to home/self care.                   Follow-up Information       Follow up With Specialties Details Why Contact Info    Effie Hernandez PA-C Family Medicine   [address] [place] [address]  [phone]              Patient's Medications   Discharge Prescriptions    LOPERAMIDE (IMODIUM) 2 MG CAPSULE    Take 1 capsule (2 mg total) by mouth 4 (four) times a day as needed for diarrhea       Start Date: 6/9/2024  End Date: --       Order Dose: 2 mg       Quantity: 12 capsule    Refills: 0       No discharge procedures on file.    PDMP Review         Value Time User    PDMP Reviewed  Yes 2/25/2022 12:45 PM Amandeep Case PA-C            ED Provider  Electronically Signed by             Lauren Mcleod MD  06/09/24 4512

## 2024-06-09 NOTE — Clinical Note
Kylah Jaycees was seen and treated in our emergency department on 6/9/2024.                Diagnosis:     Kylah  .    She may return on this date:          If you have any questions or concerns, please don't hesitate to call.      Lauren Mcleod MD    ______________________________           _______________          _______________  Hospital Representative                              Date                                Time

## 2024-06-10 RX ORDER — ALBUTEROL SULFATE 90 UG/1
2 AEROSOL, METERED RESPIRATORY (INHALATION) EVERY 6 HOURS PRN
Qty: 6.7 G | Refills: 0 | Status: SHIPPED | OUTPATIENT
Start: 2024-06-10

## 2024-06-14 DIAGNOSIS — L30.9 ECZEMA, UNSPECIFIED TYPE: ICD-10-CM

## 2024-06-17 RX ORDER — TRIAMCINOLONE ACETONIDE 1 MG/G
OINTMENT TOPICAL
Qty: 80 G | Refills: 0 | Status: SHIPPED | OUTPATIENT
Start: 2024-06-17

## 2024-06-22 DIAGNOSIS — R10.84 GENERALIZED ABDOMINAL PAIN: ICD-10-CM

## 2024-06-22 DIAGNOSIS — K59.00 CONSTIPATION, UNSPECIFIED CONSTIPATION TYPE: ICD-10-CM

## 2024-06-24 RX ORDER — SUCRALFATE 1 G/1
TABLET ORAL
Qty: 120 TABLET | Refills: 0 | Status: SHIPPED | OUTPATIENT
Start: 2024-06-24

## 2024-06-24 RX ORDER — SENNOSIDES 8.6 MG
TABLET ORAL
Qty: 60 TABLET | Refills: 0 | Status: SHIPPED | OUTPATIENT
Start: 2024-06-24

## 2024-07-12 DIAGNOSIS — R10.84 GENERALIZED ABDOMINAL PAIN: ICD-10-CM

## 2024-07-12 DIAGNOSIS — L30.9 ECZEMA, UNSPECIFIED TYPE: ICD-10-CM

## 2024-07-12 DIAGNOSIS — K59.00 CONSTIPATION, UNSPECIFIED CONSTIPATION TYPE: ICD-10-CM

## 2024-07-12 RX ORDER — SENNOSIDES 8.6 MG
TABLET ORAL
Qty: 60 TABLET | Refills: 0 | Status: SHIPPED | OUTPATIENT
Start: 2024-07-12

## 2024-07-12 RX ORDER — TRIAMCINOLONE ACETONIDE 1 MG/G
OINTMENT TOPICAL
Qty: 80 G | Refills: 0 | Status: SHIPPED | OUTPATIENT
Start: 2024-07-12

## 2024-07-12 RX ORDER — SUCRALFATE 1 G/1
TABLET ORAL
Qty: 120 TABLET | Refills: 0 | Status: SHIPPED | OUTPATIENT
Start: 2024-07-12

## 2024-07-14 ENCOUNTER — HOSPITAL ENCOUNTER (EMERGENCY)
Facility: HOSPITAL | Age: 24
Discharge: HOME | End: 2024-07-15
Attending: EMERGENCY MEDICINE | Admitting: EMERGENCY MEDICINE
Payer: COMMERCIAL

## 2024-07-14 DIAGNOSIS — R11.2 NAUSEA & VOMITING: Primary | ICD-10-CM

## 2024-07-14 DIAGNOSIS — A04.8 H. PYLORI INFECTION: ICD-10-CM

## 2024-07-14 DIAGNOSIS — R10.84 GENERALIZED ABDOMINAL PAIN: ICD-10-CM

## 2024-07-14 PROCEDURE — 99283 EMERGENCY DEPT VISIT LOW MDM: CPT

## 2024-07-14 NOTE — Clinical Note
Kylah Goff was seen and treated in our emergency department on 7/14/2024.                Diagnosis:     Kylah  .    She may return on this date:     Kylah was seen in the ED for evaluation.  Please excuse her for her absence from work yesterday and today.  Thank you.      If you have any questions or concerns, please don't hesitate to call.      Tomy Juarez PA-C    ______________________________           _______________          _______________  Hospital Representative                              Date                                Time

## 2024-07-15 VITALS
WEIGHT: 132 LBS | HEART RATE: 75 BPM | BODY MASS INDEX: 24.14 KG/M2 | DIASTOLIC BLOOD PRESSURE: 70 MMHG | TEMPERATURE: 98.6 F | RESPIRATION RATE: 18 BRPM | SYSTOLIC BLOOD PRESSURE: 127 MMHG | OXYGEN SATURATION: 98 %

## 2024-07-15 LAB
EXT PREGNANCY TEST URINE: NEGATIVE
EXT. CONTROL: NORMAL

## 2024-07-15 PROCEDURE — 81025 URINE PREGNANCY TEST: CPT

## 2024-07-15 PROCEDURE — 99284 EMERGENCY DEPT VISIT MOD MDM: CPT

## 2024-07-15 RX ORDER — OMEPRAZOLE 20 MG/1
20 CAPSULE, DELAYED RELEASE ORAL 2 TIMES DAILY
Qty: 28 CAPSULE | Refills: 0 | Status: SHIPPED | OUTPATIENT
Start: 2024-07-15 | End: 2024-07-29

## 2024-07-15 RX ORDER — BISMUTH SUBSALICYLATE 262 MG/1
262 TABLET, CHEWABLE ORAL
Qty: 56 TABLET | Refills: 0 | Status: SHIPPED | OUTPATIENT
Start: 2024-07-15 | End: 2024-07-29

## 2024-07-15 RX ORDER — FAMOTIDINE 20 MG/1
20 TABLET, FILM COATED ORAL ONCE
Status: COMPLETED | OUTPATIENT
Start: 2024-07-15 | End: 2024-07-15

## 2024-07-15 RX ORDER — TETRACYCLINE HYDROCHLORIDE 500 MG/1
500 CAPSULE ORAL 4 TIMES DAILY
Qty: 56 CAPSULE | Refills: 0 | Status: SHIPPED | OUTPATIENT
Start: 2024-07-15 | End: 2024-07-29

## 2024-07-15 RX ORDER — ONDANSETRON 4 MG/1
4 TABLET, ORALLY DISINTEGRATING ORAL ONCE
Status: COMPLETED | OUTPATIENT
Start: 2024-07-15 | End: 2024-07-15

## 2024-07-15 RX ORDER — METRONIDAZOLE 250 MG/1
250 TABLET ORAL EVERY 6 HOURS
Qty: 56 TABLET | Refills: 0 | Status: SHIPPED | OUTPATIENT
Start: 2024-07-15 | End: 2024-07-29

## 2024-07-15 RX ADMIN — FAMOTIDINE 20 MG: 20 TABLET, FILM COATED ORAL at 00:34

## 2024-07-15 RX ADMIN — ONDANSETRON 4 MG: 4 TABLET, ORALLY DISINTEGRATING ORAL at 00:22

## 2024-07-15 NOTE — ED PROVIDER NOTES
"History  Chief Complaint   Patient presents with    Vomiting     Pt c/o vomiting x4 over the last two days, unable to tolerate food or fluids. Also states she has \"burning\" epigastric pain. Denies fevers. No meds PTA     24-year-old female presents to ED for evaluation of nausea, vomiting.  Patient states that the past 2 days she has had 4 episodes of vomiting.  Has had poor tolerance of p.o. intake.  Vomit was nonbloody, nonbilious.  Having some epigastric discomfort.  No other symptoms.  Denies fever, chills, shortness of breath, chest pain, diarrhea, hematuria, dysuria, increased urinary frequency.      Of note patient recently had EGD performed and tested positive for H. pylori.  Was prescribed treatment for H. pylori however patient only did 2 days of treatment.  Has not seen GI since this.         Prior to Admission Medications   Prescriptions Last Dose Informant Patient Reported? Taking?   Natural Senna Laxative 8.6 MG tablet   No No   Sig: TAKE ONE TABLET BY MOUTH TWICE A DAY AS NEEDED FOR CONSTIPATION   albuterol (2.5 mg/3 mL) 0.083 % nebulizer solution   No No   Sig: Take 3 mL (2.5 mg total) by nebulization every 6 (six) hours as needed for wheezing or shortness of breath   albuterol (PROVENTIL HFA,VENTOLIN HFA) 90 mcg/act inhaler   No No   Sig: INHALE 2 PUFFS BY MOUTH EVERY 6 HOURS AS NEEDED FOR WHEEZING   bisacodyl (FLEET) 10 MG/30ML ENEM   No No   Sig: Insert 30 mL (10 mg total) into the rectum once for 1 dose   hydrocortisone 2.5 % cream   No No   Sig: Apply topically 2 (two) times a day   ibuprofen (MOTRIN) 800 mg tablet   No No   Sig: Take 1 tablet (800 mg total) by mouth 3 (three) times a day   loperamide (IMODIUM) 2 mg capsule   No No   Sig: Take 1 capsule (2 mg total) by mouth 4 (four) times a day as needed for diarrhea   omeprazole (PriLOSEC) 20 mg delayed release capsule   No No   Sig: Take 1 capsule (20 mg total) by mouth daily   omeprazole (PriLOSEC) 20 mg delayed release capsule   No Yes "   Sig: Take 1 capsule (20 mg total) by mouth 2 (two) times a day for 14 days   sucralfate (CARAFATE) 1 g tablet   No No   Sig: TAKE ONE TABLET BY MOUTH FOUR TIMES A DAY BEFORE MEALS AS NEEDED   triamcinolone (KENALOG) 0.1 % ointment   No No   Sig: APPLY TOPICALLY TO AFFECTED AREAS TWICE A DAY      Facility-Administered Medications: None       Past Medical History:   Diagnosis Date    Asthma     Endometriosis     GERD (gastroesophageal reflux disease)        Past Surgical History:   Procedure Laterality Date    LAPAROSCOPY      for endometriosis    MD EXC B9 LESION MRGN XCP SK TG F/E/E/N/L/M > 4.0CM N/A 2/25/2022    Procedure: EXCISION OF FOREHEAD SUBCUTANEOUS LESION WITH COMPLEX CLOSURE;  Surgeon: Elkin Cade MD;  Location: [facility];  Service: Plastics       Family History   Problem Relation Age of Onset    Anxiety disorder Mother     Depression Mother     Asthma Mother     Asthma Brother      I have reviewed and agree with the history as documented.    E-Cigarette/Vaping    E-Cigarette Use Current Some Day User     Comments vaping      E-Cigarette/Vaping Substances    Nicotine No     Flavoring Yes      Social History     Tobacco Use    Smoking status: Former     Types: Cigarettes     Passive exposure: Current    Smokeless tobacco: Never    Tobacco comments:     hookah socially    Vaping Use    Vaping status: Some Days    Substances: Flavoring   Substance Use Topics    Alcohol use: Not Currently    Drug use: Never       Review of Systems   Gastrointestinal:  Positive for abdominal pain, nausea and vomiting.   All other systems reviewed and are negative.      Physical Exam  Physical Exam  Vitals and nursing note reviewed.   Constitutional:       General: She is not in acute distress.     Appearance: Normal appearance. She is well-developed. She is not ill-appearing, toxic-appearing or diaphoretic.   HENT:      Head: Normocephalic and atraumatic.      Mouth/Throat:      Mouth: Mucous membranes are moist.       Pharynx: Oropharynx is clear. No oropharyngeal exudate or posterior oropharyngeal erythema.   Eyes:      Conjunctiva/sclera: Conjunctivae normal.   Cardiovascular:      Rate and Rhythm: Normal rate and regular rhythm.      Pulses: Normal pulses.      Heart sounds: Normal heart sounds. No murmur heard.     No friction rub. No gallop.   Pulmonary:      Effort: Pulmonary effort is normal. No respiratory distress.      Breath sounds: No stridor. No wheezing, rhonchi or rales.   Abdominal:      Palpations: Abdomen is soft.      Tenderness: There is no abdominal tenderness. There is no right CVA tenderness, left CVA tenderness, guarding or rebound.      Hernia: No hernia is present.   Musculoskeletal:         General: No swelling.      Cervical back: Neck supple.   Skin:     General: Skin is warm and dry.      Capillary Refill: Capillary refill takes less than 2 seconds.      Coloration: Skin is not jaundiced or pale.      Findings: No rash.   Neurological:      Mental Status: She is alert.   Psychiatric:         Mood and Affect: Mood normal.         Vital Signs  ED Triage Vitals [07/15/24 0001]   Temperature Pulse Respirations Blood Pressure SpO2   98.6 °F (37 °C) 75 18 127/70 98 %      Temp Source Heart Rate Source Patient Position - Orthostatic VS BP Location FiO2 (%)   Oral Monitor Lying Left arm --      Pain Score       --           Vitals:    07/15/24 0001   BP: 127/70   Pulse: 75   Patient Position - Orthostatic VS: Lying         Visual Acuity      ED Medications  Medications   ondansetron (ZOFRAN-ODT) dispersible tablet 4 mg (4 mg Oral Given 7/15/24 0022)   famotidine (PEPCID) tablet 20 mg (20 mg Oral Given 7/15/24 0034)       Diagnostic Studies  Results Reviewed       Procedure Component Value Units Date/Time    POCT pregnancy, urine [[id number]]  (Normal) Resulted: 07/15/24 0023    Lab Status: Final result Updated: 07/15/24 0023     EXT Preg Test, Ur Negative     Control Valid                   No orders to  display              Procedures  Procedures         ED Course                                 SBIRT 20yo+      Flowsheet Row Most Recent Value   Initial Alcohol Screen: US AUDIT-C     1. How often do you have a drink containing alcohol? 0 Filed at: 07/15/2024 0004   2. How many drinks containing alcohol do you have on a typical day you are drinking?  0 Filed at: 07/15/2024 0004   3b. FEMALE Any Age, or MALE 65+: How often do you have 4 or more drinks on one occassion? 0 Filed at: 07/15/2024 0004   Audit-C Score 0 Filed at: 07/15/2024 0004   KARYNA: How many times in the past year have you...    Used an illegal drug or used a prescription medication for non-medical reasons? Never Filed at: 07/15/2024 0004                      Medical Decision Making  24-year-old female presents to ED for evaluation of nausea, vomiting, epigastric pain as above.  On physical examination patient vital signs stable.  Normotensive.  Afebrile.  Nontachycardic.  No murmur.  Normal breath sounds.  No reproducible abdominal pain.  No overlying skin changes of abdomen.  Benign examination.  Obtained urine pregnancy to make sure patient not pregnant.  Urine pregnancy returned negative.  Discussed with patient about her H. pylori infection.  Explained that she only took 2 days of the treatment and that there is a possibility the H. pylori was not fully treated with 2 days of treatment.  Plan to discharge patient with prescription for the same medications that GI prescribed to her several months ago for H. pylori infection.  Advised patient to complete the full 14-day course of the medications in order to best treat the H. pylori infection.  Advised patient to follow-up with GI.  In the ED provided dose of Zofran for nausea and vomiting.  Also provided dose of Pepcid.  Advised to return to ED for new or worsening symptoms.  Patient in agreement with plan.  Patient discharged.    Prior to discharge, discharge instructions were discussed with  patient at bedside. Patient was provided both verbal and written instructions. Patient is understanding of the discharge instructions and is agreeable to plan of care. Return precautions were discussed with patient bedside, patient verbalized understanding of signs and symptoms that would necessitate return to the ED. All questions were answered. Patient was comfortable with the plan of care and discharged to home.     Amount and/or Complexity of Data Reviewed  Labs: ordered.    Risk  OTC drugs.  Prescription drug management.                 Disposition  Final diagnoses:   Nausea & vomiting   H. pylori infection     Time reflects when diagnosis was documented in both MDM as applicable and the Disposition within this note       Time User Action Codes Description Comment    7/15/2024 12:29 AM Tomy Juarez [R11.2] Nausea & vomiting     7/15/2024 12:30 AM Tomy Juarez [A04.8] H. pylori infection     7/15/2024 12:30 AM Tomy Juarez Add [R10.84] Generalized abdominal pain           ED Disposition       ED Disposition   Discharge    Condition   Stable    Date/Time   Mon Jul 15, 2024 0029    Comment   Kylah Goff discharge to home/self care.                   Follow-up Information       Follow up With Specialties Details Why Contact Info    Effie Hernandez PA-C Family Medicine   [address] [place] [address]  [phone]              Discharge Medication List as of 7/15/2024 12:37 AM        START taking these medications    Details   bismuth subsalicylate (PEPTO BISMOL) 262 MG chewable tablet Chew 1 tablet (262 mg total) 4 (four) times a day (before meals and at bedtime) for 14 days, Starting Mon 7/15/2024, Until Mon 7/29/2024, Normal      metroNIDAZOLE (FLAGYL) 250 mg tablet Take 1 tablet (250 mg total) by mouth every 6 (six) hours for 14 days, Starting Mon 7/15/2024, Until Mon 7/29/2024, Normal      tetracycline (ACHROMYCIN,SUMYCIN) 500 MG capsule Take 1 capsule (500 mg  total) by mouth 4 (four) times a day for 14 days, Starting Mon 7/15/2024, Until Mon 7/29/2024, Normal           CONTINUE these medications which have CHANGED    Details   omeprazole (PriLOSEC) 20 mg delayed release capsule Take 1 capsule (20 mg total) by mouth 2 (two) times a day for 14 days, Starting Mon 7/15/2024, Until Mon 7/29/2024, Normal           CONTINUE these medications which have NOT CHANGED    Details   albuterol (2.5 mg/3 mL) 0.083 % nebulizer solution Take 3 mL (2.5 mg total) by nebulization every 6 (six) hours as needed for wheezing or shortness of breath, Starting Wed 4/10/2024, Normal      albuterol (PROVENTIL HFA,VENTOLIN HFA) 90 mcg/act inhaler INHALE 2 PUFFS BY MOUTH EVERY 6 HOURS AS NEEDED FOR WHEEZING, Starting Mon 6/10/2024, Normal      bisacodyl (FLEET) 10 MG/30ML ENEM Insert 30 mL (10 mg total) into the rectum once for 1 dose, Starting Tue 12/12/2023, Normal      hydrocortisone 2.5 % cream Apply topically 2 (two) times a day, Starting Wed 4/10/2024, Normal      ibuprofen (MOTRIN) 800 mg tablet Take 1 tablet (800 mg total) by mouth 3 (three) times a day, Starting Sun 5/19/2024, Normal      loperamide (IMODIUM) 2 mg capsule Take 1 capsule (2 mg total) by mouth 4 (four) times a day as needed for diarrhea, Starting Sun 6/9/2024, Normal      Natural Senna Laxative 8.6 MG tablet TAKE ONE TABLET BY MOUTH TWICE A DAY AS NEEDED FOR CONSTIPATION, Normal      sucralfate (CARAFATE) 1 g tablet TAKE ONE TABLET BY MOUTH FOUR TIMES A DAY BEFORE MEALS AS NEEDED, Normal      triamcinolone (KENALOG) 0.1 % ointment APPLY TOPICALLY TO AFFECTED AREAS TWICE A DAY, Normal             No discharge procedures on file.    PDMP Review         Value Time User    PDMP Reviewed  Yes 2/25/2022 12:45 PM Amandeep Case PA-C            ED Provider  Electronically Signed by             Tomy Juarez PA-C  07/15/24 [id number]

## 2024-07-15 NOTE — DISCHARGE INSTRUCTIONS
Today I provided prescription for medications used to treat your H. pylori infection.  Take as directed.  Follow-up with GI.    Return to ED for new or worsening symptoms.      · Monitor hemoglobin  · Likely multifactorial in setting of iron deficiency and chronic disease

## 2024-07-18 DIAGNOSIS — J45.20 MILD INTERMITTENT ASTHMA WITHOUT COMPLICATION: ICD-10-CM

## 2024-07-18 RX ORDER — ALBUTEROL SULFATE 90 UG/1
AEROSOL, METERED RESPIRATORY (INHALATION)
Qty: 6.7 G | Refills: 0 | Status: SHIPPED | OUTPATIENT
Start: 2024-07-18

## 2024-07-19 DIAGNOSIS — J45.20 MILD INTERMITTENT ASTHMA WITHOUT COMPLICATION: ICD-10-CM

## 2024-07-19 RX ORDER — ALBUTEROL SULFATE 90 UG/1
AEROSOL, METERED RESPIRATORY (INHALATION)
Qty: 6.7 G | Refills: 0 | OUTPATIENT
Start: 2024-07-19

## 2024-08-02 DIAGNOSIS — J45.20 MILD INTERMITTENT ASTHMA WITHOUT COMPLICATION: ICD-10-CM

## 2024-08-05 RX ORDER — ALBUTEROL SULFATE 90 UG/1
AEROSOL, METERED RESPIRATORY (INHALATION)
Qty: 6.7 G | Refills: 0 | OUTPATIENT
Start: 2024-08-05

## 2024-08-07 ENCOUNTER — APPOINTMENT (OUTPATIENT)
Dept: LAB | Facility: HOSPITAL | Age: 24
End: 2024-08-07
Payer: COMMERCIAL

## 2024-08-07 ENCOUNTER — OFFICE VISIT (OUTPATIENT)
Dept: GASTROENTEROLOGY | Facility: MEDICAL CENTER | Age: 24
End: 2024-08-07
Payer: COMMERCIAL

## 2024-08-07 ENCOUNTER — HOSPITAL ENCOUNTER (OUTPATIENT)
Dept: RADIOLOGY | Facility: HOSPITAL | Age: 24
Discharge: HOME | End: 2024-08-07
Payer: COMMERCIAL

## 2024-08-07 VITALS
DIASTOLIC BLOOD PRESSURE: 76 MMHG | WEIGHT: 129 LBS | TEMPERATURE: 97.7 F | HEART RATE: 72 BPM | OXYGEN SATURATION: 99 % | BODY MASS INDEX: 23.59 KG/M2 | SYSTOLIC BLOOD PRESSURE: 116 MMHG

## 2024-08-07 DIAGNOSIS — R19.7 DIARRHEA, UNSPECIFIED TYPE: ICD-10-CM

## 2024-08-07 DIAGNOSIS — R11.14 BILIOUS VOMITING WITH NAUSEA: ICD-10-CM

## 2024-08-07 DIAGNOSIS — A04.8 H. PYLORI INFECTION: Primary | ICD-10-CM

## 2024-08-07 DIAGNOSIS — K21.9 GASTROESOPHAGEAL REFLUX DISEASE WITHOUT ESOPHAGITIS: ICD-10-CM

## 2024-08-07 LAB — IGA SERPL-MCNC: 181 MG/DL (ref 66–433)

## 2024-08-07 PROCEDURE — 99214 OFFICE O/P EST MOD 30 MIN: CPT | Performed by: NURSE PRACTITIONER

## 2024-08-07 PROCEDURE — 86364 TISS TRNSGLTMNASE EA IG CLAS: CPT

## 2024-08-07 PROCEDURE — 36415 COLL VENOUS BLD VENIPUNCTURE: CPT

## 2024-08-07 PROCEDURE — 82784 ASSAY IGA/IGD/IGG/IGM EACH: CPT

## 2024-08-07 PROCEDURE — 74022 RADEX COMPL AQT ABD SERIES: CPT

## 2024-08-07 RX ORDER — FAMOTIDINE 40 MG/1
40 TABLET, FILM COATED ORAL 2 TIMES DAILY
Qty: 60 TABLET | Refills: 3 | Status: SHIPPED | OUTPATIENT
Start: 2024-08-07

## 2024-08-07 NOTE — PROGRESS NOTES
[facility] - Outpatient Follow-up Note  Kylah Goff 24 y.o. female MRN: [id number]  Encounter: [id number]          ASSESSMENT AND PLAN:      1.  H. pylori infection  2.  Nausea and vomiting    Patient presented at last visit with several months of epigastric pain, nausea and vomiting.  Cannot pinpoint triggers.  Recent CT abdomen and pelvis were normal.  She does use ibuprofen 1-2 times per week.  No alcohol caffeine use.  EGD performed 3/24 was normal exam but biopsies were positive for H. pylori.  Patient was recommended quadruple therapy but was only able to take 2 days of the therapy and then stopped it due to side effects. Recently in the ER x 2 over the past 3 months for nausea, vomiting and abdominal pain.  She has not followed up since her EGD 3/24.  Will check for eradication of H. pylori in 2 weeks off PPI with a stool antigen.  Will obtain a gastric emptying study due to chronic nausea and vomiting.  If these are all normal/negative then consider HIDA scan.    -Stool for H. pylori off PPI for 2 weeks - 1 month after completing treatment but should be the end of August  -Start Pepcid 40 mg twice daily in place of PPI  -Antireflux diet  -Gastric emptying study  -If H. pylori is negative and gastric emptying study is normal would consider HIDA scan  -Follow-up in office in 2 to 3 months or sooner if needed    3.  Diarrhea    Reporting stools have now been loose over the past several weeks 1-2 times per day.  Denies any melena hematochezia.  She did have an underlying chronic constipation but reports the stools have changed and are loose.  Due to recent use of antibiotics I will rule out C. difficile and other enteric pathogen's.  Suspect this may be chronic constipation related and possible overflow diarrhea.    -Fiber supplement daily  -Water intake of at least 64 ounces per day  -Stool for C. difficile, fecal calprotectin, Giardia and enteric  pathogens  -Obstructive series to assess for stool burden  -Will contact patient with results  ______________________________________________________________________    SUBJECTIVE: 24-year-old female here for follow-up.  She was last seen by myself 3/24 for change in bowel habits, upper abdominal pain, nausea, vomiting and heartburn.    Patient presented at last visit with several months of epigastric pain, nausea and vomiting.  Cannot pinpoint triggers.  Recent CT abdomen and pelvis were normal.  She does use ibuprofen 1-2 times per week.  No alcohol caffeine use.  EGD performed 3/24 was normal exam but biopsies were positive for H. pylori.  Patient was recommended quadruple therapy but was only able to take 2 days of the therapy and then stopped it due to side effects.    Recently in the ER x 2 over the past 3 months for nausea, vomiting and abdominal pain.  She has not followed up since her EGD 3/24.    History of chronic constipation.  Obstructive series 3/24 noted large amount of stool in colon.  MiraLAX cleanout was recommended at that time and start MiraLAX 1 capful daily.    Interval history: She was given triple therapy for her H. pylori in the ER in mid July.  Reports she completed the full therapy.  Continue omeprazole 20 mg twice daily.  Reporting intermittent bouts of nausea and vomiting that can occur on a daily basis.  No weight loss.  Cannot pinpoint specific food triggers.  Does get intermittent epigastric pain postprandially.    Reporting stools have now been loose over the past several weeks 1-2 times per day.  Denies any melena hematochezia.  She did have an underlying chronic constipation but reports the stools have changed and are loose.        Prior EGD/colonoscopy     EGD 3/24-normal exam biopsies were positive for H. pylori.    REVIEW OF SYSTEMS IS OTHERWISE NEGATIVE.  10 point review of systems negative other than per HPI      Historical Information   Past Medical History:   Diagnosis Date     Asthma     Endometriosis     GERD (gastroesophageal reflux disease)      Past Surgical History:   Procedure Laterality Date    LAPAROSCOPY      for endometriosis    NC EXC B9 LESION MRGN XCP SK TG F/E/E/N/L/M > 4.0CM N/A 2/25/2022    Procedure: EXCISION OF FOREHEAD SUBCUTANEOUS LESION WITH COMPLEX CLOSURE;  Surgeon: Elkin Cade MD;  Location: [facility];  Service: Plastics     Social History   Social History     Substance and Sexual Activity   Alcohol Use Not Currently     Social History     Substance and Sexual Activity   Drug Use Never     Social History     Tobacco Use   Smoking Status Former    Types: Cigarettes    Passive exposure: Current   Smokeless Tobacco Never   Tobacco Comments    hookah socially      Family History   Problem Relation Age of Onset    Anxiety disorder Mother     Depression Mother     Asthma Mother     Asthma Brother     Stomach cancer Maternal Grandmother        Meds/Allergies       Current Outpatient Medications:     albuterol (2.5 mg/3 mL) 0.083 % nebulizer solution    albuterol (PROVENTIL HFA,VENTOLIN HFA) 90 mcg/act inhaler    famotidine (PEPCID) 40 MG tablet    hydrocortisone 2.5 % cream    ibuprofen (MOTRIN) 800 mg tablet    loperamide (IMODIUM) 2 mg capsule    sucralfate (CARAFATE) 1 g tablet    bisacodyl (FLEET) 10 MG/30ML ENEM    Natural Senna Laxative 8.6 MG tablet    omeprazole (PriLOSEC) 20 mg delayed release capsule    triamcinolone (KENALOG) 0.1 % ointment    No Known Allergies        Objective     Blood pressure 116/76, pulse 72, temperature 97.7 °F (36.5 °C), temperature source Tympanic, weight 58.5 kg (129 lb), last menstrual period 06/11/2024, SpO2 99%, not currently breastfeeding. Body mass index is 23.59 kg/m².      PHYSICAL EXAM:      General Appearance:   Alert, cooperative, no distress   HEENT:   Normocephalic, atraumatic, anicteric.     Neck:  Supple, symmetrical, trachea midline   Lungs:   Clear to auscultation bilaterally; no rales, rhonchi or wheezing;  respirations unlabored    Heart::   Regular rate and rhythm; no murmur, rub, or gallop.   Abdomen:   Soft, non-tender, non-distended; normal bowel sounds; no masses, no organomegaly    Genitalia:   Deferred    Rectal:   Deferred    Extremities:  No cyanosis, clubbing or edema    Pulses:  2+ and symmetric    Skin:  No jaundice, rashes, or lesions    Lymph nodes:  No palpable cervical lymphadenopathy        Lab Results:   No visits with results within 1 Day(s) from this visit.   Latest known visit with results is:   Admission on 07/14/2024, Discharged on 07/15/2024   Component Date Value    EXT Preg Test, Ur 07/15/2024 Negative     Control 07/15/2024 Valid          Radiology Results:   No results found.

## 2024-08-09 DIAGNOSIS — K59.00 CONSTIPATION, UNSPECIFIED CONSTIPATION TYPE: ICD-10-CM

## 2024-08-09 DIAGNOSIS — R10.84 GENERALIZED ABDOMINAL PAIN: ICD-10-CM

## 2024-08-09 LAB — TTG IGA SER-ACNC: <2 U/ML (ref 0–3)

## 2024-08-12 RX ORDER — SENNOSIDES 8.6 MG
TABLET ORAL
Qty: 60 TABLET | Refills: 0 | Status: SHIPPED | OUTPATIENT
Start: 2024-08-12

## 2024-08-12 RX ORDER — SUCRALFATE 1 G/1
TABLET ORAL
Qty: 120 TABLET | Refills: 0 | Status: SHIPPED | OUTPATIENT
Start: 2024-08-12

## 2024-08-16 DIAGNOSIS — L30.9 ECZEMA, UNSPECIFIED TYPE: ICD-10-CM

## 2024-08-19 RX ORDER — TRIAMCINOLONE ACETONIDE 1 MG/G
OINTMENT TOPICAL
Qty: 80 G | Refills: 0 | Status: SHIPPED | OUTPATIENT
Start: 2024-08-19

## 2024-09-10 DIAGNOSIS — R10.84 GENERALIZED ABDOMINAL PAIN: ICD-10-CM

## 2024-09-10 DIAGNOSIS — K59.00 CONSTIPATION, UNSPECIFIED CONSTIPATION TYPE: ICD-10-CM

## 2024-09-11 RX ORDER — SENNOSIDES 8.6 MG
TABLET ORAL
Qty: 60 TABLET | Refills: 0 | Status: SHIPPED | OUTPATIENT
Start: 2024-09-11

## 2024-09-11 RX ORDER — SUCRALFATE 1 G/1
TABLET ORAL
Qty: 120 TABLET | Refills: 0 | Status: SHIPPED | OUTPATIENT
Start: 2024-09-11

## 2024-09-17 DIAGNOSIS — L30.9 ECZEMA, UNSPECIFIED TYPE: ICD-10-CM

## 2024-09-18 RX ORDER — TRIAMCINOLONE ACETONIDE 1 MG/G
OINTMENT TOPICAL
Qty: 80 G | Refills: 0 | Status: SHIPPED | OUTPATIENT
Start: 2024-09-18

## 2024-09-23 ENCOUNTER — HOSPITAL ENCOUNTER (OUTPATIENT)
Dept: RADIOLOGY | Facility: HOSPITAL | Age: 24
Discharge: HOME | End: 2024-09-23
Payer: COMMERCIAL

## 2024-09-23 DIAGNOSIS — R11.14 BILIOUS VOMITING WITH NAUSEA: ICD-10-CM

## 2024-09-23 PROCEDURE — A9541 TC99M SULFUR COLLOID: HCPCS

## 2024-09-23 PROCEDURE — 78264 GASTRIC EMPTYING IMG STUDY: CPT

## 2024-09-30 DIAGNOSIS — K59.00 CONSTIPATION, UNSPECIFIED CONSTIPATION TYPE: ICD-10-CM

## 2024-09-30 DIAGNOSIS — R10.84 GENERALIZED ABDOMINAL PAIN: ICD-10-CM

## 2024-09-30 RX ORDER — SUCRALFATE 1 G/1
TABLET ORAL
Qty: 120 TABLET | Refills: 0 | OUTPATIENT
Start: 2024-09-30

## 2024-09-30 RX ORDER — SENNOSIDES 8.6 MG
TABLET ORAL
Qty: 60 TABLET | Refills: 0 | OUTPATIENT
Start: 2024-09-30

## 2024-10-08 DIAGNOSIS — K59.00 CONSTIPATION, UNSPECIFIED CONSTIPATION TYPE: ICD-10-CM

## 2024-10-08 DIAGNOSIS — R10.84 GENERALIZED ABDOMINAL PAIN: ICD-10-CM

## 2024-10-08 RX ORDER — SENNOSIDES 8.6 MG
TABLET ORAL
Qty: 60 TABLET | Refills: 0 | Status: SHIPPED | OUTPATIENT
Start: 2024-10-08

## 2024-10-08 RX ORDER — SUCRALFATE 1 G/1
TABLET ORAL
Qty: 120 TABLET | Refills: 0 | Status: SHIPPED | OUTPATIENT
Start: 2024-10-08

## 2024-11-28 ENCOUNTER — HOSPITAL ENCOUNTER (EMERGENCY)
Facility: HOSPITAL | Age: 24
Discharge: HOME | End: 2024-11-28
Attending: EMERGENCY MEDICINE

## 2024-11-28 VITALS
WEIGHT: 130.2 LBS | OXYGEN SATURATION: 99 % | TEMPERATURE: 97.7 F | SYSTOLIC BLOOD PRESSURE: 139 MMHG | BODY MASS INDEX: 23.81 KG/M2 | RESPIRATION RATE: 16 BRPM | HEART RATE: 80 BPM | DIASTOLIC BLOOD PRESSURE: 82 MMHG

## 2024-11-28 DIAGNOSIS — H10.9 CONJUNCTIVITIS: Primary | ICD-10-CM

## 2024-11-28 PROCEDURE — 99284 EMERGENCY DEPT VISIT MOD MDM: CPT | Performed by: EMERGENCY MEDICINE

## 2024-11-28 PROCEDURE — 99282 EMERGENCY DEPT VISIT SF MDM: CPT

## 2024-11-28 RX ORDER — ERYTHROMYCIN 5 MG/G
OINTMENT OPHTHALMIC
Qty: 3.5 G | Refills: 0 | Status: SHIPPED | OUTPATIENT
Start: 2024-11-28

## 2024-11-28 NOTE — Clinical Note
Kylah Goff was seen and treated in our emergency department on 11/28/2024.                Diagnosis:     Kylah  may return to work on return date.    She may return on this date: 11/29/2024         If you have any questions or concerns, please don't hesitate to call.      Jeremy Go MD    ______________________________           _______________          _______________  Hospital Representative                              Date                                Time

## 2024-11-28 NOTE — ED PROVIDER NOTES
Time reflects when diagnosis was documented in both MDM as applicable and the Disposition within this note       Time User Action Codes Description Comment    11/28/2024  2:33 PM Jeremy Go Add [H10.9] Conjunctivitis           ED Disposition       ED Disposition   Discharge    Condition   Stable    Date/Time   Thu Nov 28, 2024  2:33 PM    Comment   Kylah Sullivan AndrésMadavids discharge to home/self care.                   Assessment & Plan       Medical Decision Making  Problems Addressed:  Conjunctivitis: acute illness or injury     Details: Red itchy eye with discharge.  Abx ointment prescribed.    Risk  Prescription drug management.             Medications - No data to display    ED Risk Strat Scores                                               History of Present Illness       No chief complaint on file.      Past Medical History:   Diagnosis Date    Asthma     Endometriosis     GERD (gastroesophageal reflux disease)       Past Surgical History:   Procedure Laterality Date    LAPAROSCOPY      for endometriosis    MS EXC B9 LESION MRGN XCP SK TG F/E/E/N/L/M > 4.0CM N/A 2/25/2022    Procedure: EXCISION OF FOREHEAD SUBCUTANEOUS LESION WITH COMPLEX CLOSURE;  Surgeon: Elkin Cade MD;  Location: [facility];  Service: Plastics      Family History   Problem Relation Age of Onset    Anxiety disorder Mother     Depression Mother     Asthma Mother     Asthma Brother     Stomach cancer Maternal Grandmother       Social History     Tobacco Use    Smoking status: Former     Types: Cigarettes     Passive exposure: Current    Smokeless tobacco: Never    Tobacco comments:     hookah socially    Vaping Use    Vaping status: Some Days    Substances: Flavoring   Substance Use Topics    Alcohol use: Not Currently    Drug use: Never      E-Cigarette/Vaping    E-Cigarette Use Current Some Day User     Comments vaping       E-Cigarette/Vaping Substances    Nicotine No     Flavoring Yes       I have reviewed and agree with the  history as documented.     Patient is a 24-year-old female with a 1 week h/o red itchy left eye with thick discharge.  Spoke with PCP, told to continue regular eye drops.  No fsc, no congestion.  No issues with right eye.  No contacts or glasses.         Review of Systems   Constitutional: Negative.    HENT: Negative.     Eyes:  Positive for discharge, redness and itching.   Respiratory: Negative.     Cardiovascular: Negative.    Gastrointestinal: Negative.    Endocrine: Negative.    Genitourinary: Negative.    Musculoskeletal: Negative.    Skin: Negative.    Allergic/Immunologic: Negative.    Neurological: Negative.    Hematological: Negative.    Psychiatric/Behavioral: Negative.     All other systems reviewed and are negative.          Objective       ED Triage Vitals   Temp Pulse BP Resp SpO2 Patient Position - Orthostatic VS   -- -- -- -- -- --      Temp src Heart Rate Source BP Location FiO2 (%) Pain Score    -- -- -- -- --      Vitals    None         Physical Exam  Vitals and nursing note reviewed.   Constitutional:       Appearance: Normal appearance.   HENT:      Head: Normocephalic and atraumatic.      Right Ear: Tympanic membrane, ear canal and external ear normal.      Left Ear: Tympanic membrane, ear canal and external ear normal.      Nose: Nose normal. No congestion or rhinorrhea.      Mouth/Throat:      Mouth: Mucous membranes are moist.      Pharynx: Oropharynx is clear.   Eyes:      General:         Right eye: No discharge.         Left eye: Discharge present.     Extraocular Movements: Extraocular movements intact.      Right eye: Normal extraocular motion and no nystagmus.      Left eye: Normal extraocular motion and no nystagmus.      Conjunctiva/sclera:      Left eye: Left conjunctiva is injected.   Cardiovascular:      Rate and Rhythm: Normal rate and regular rhythm.      Pulses: Normal pulses.      Heart sounds: Normal heart sounds.   Pulmonary:      Effort: Pulmonary effort is normal.       Breath sounds: Normal breath sounds.   Musculoskeletal:         General: Normal range of motion.      Cervical back: Neck supple.   Skin:     General: Skin is warm and dry.   Neurological:      Mental Status: She is alert.         Results Reviewed       None            No orders to display       Procedures    ED Medication and Procedure Management   Prior to Admission Medications   Prescriptions Last Dose Informant Patient Reported? Taking?   Natural Senna Laxative 8.6 MG tablet   No No   Sig: TAKE ONE TABLET BY MOUTH TWICE A DAY AS NEEDED FOR CONSTIPATION   albuterol (2.5 mg/3 mL) 0.083 % nebulizer solution   No No   Sig: Take 3 mL (2.5 mg total) by nebulization every 6 (six) hours as needed for wheezing or shortness of breath   albuterol (PROVENTIL HFA,VENTOLIN HFA) 90 mcg/act inhaler   No No   Sig: INHALE 2 PUFFS BY MOUTH EVERY SIX HOURS AS NEEDED FOR WHEEZING   bisacodyl (FLEET) 10 MG/30ML ENEM   No No   Sig: Insert 30 mL (10 mg total) into the rectum once for 1 dose   famotidine (PEPCID) 40 MG tablet   No No   Sig: Take 1 tablet (40 mg total) by mouth 2 (two) times a day   hydrocortisone 2.5 % cream   No No   Sig: Apply topically 2 (two) times a day   ibuprofen (MOTRIN) 800 mg tablet   No No   Sig: Take 1 tablet (800 mg total) by mouth 3 (three) times a day   loperamide (IMODIUM) 2 mg capsule   No No   Sig: Take 1 capsule (2 mg total) by mouth 4 (four) times a day as needed for diarrhea   omeprazole (PriLOSEC) 20 mg delayed release capsule   No No   Sig: Take 1 capsule (20 mg total) by mouth 2 (two) times a day for 14 days   sucralfate (CARAFATE) 1 g tablet   No No   Sig: TAKE ONE TABLET BY MOUTH FOUR TIMES A DAY BEFORE MEALS AS NEEDED   triamcinolone (KENALOG) 0.1 % ointment   No No   Sig: APPLY TOPICALLY TO AFFECTED AREA TWICE A DAY      Facility-Administered Medications: None     Patient's Medications   Discharge Prescriptions    ERYTHROMYCIN (ILOTYCIN) OPHTHALMIC OINTMENT    Place a 1/2 inch ribbon of  ointment into the lower eyelid four times a day for 7 days.       Start Date: 11/28/2024End Date: --       Order Dose: --       Quantity: 3.5 g    Refills: 0     No discharge procedures on file.  ED SEPSIS DOCUMENTATION   Time reflects when diagnosis was documented in both MDM as applicable and the Disposition within this note       Time User Action Codes Description Comment    11/28/2024  2:33 PM Jeremy Go Add [H10.9] Conjunctivitis                  Jeremy Go MD  11/28/24 1651

## 2024-11-30 ENCOUNTER — HOSPITAL ENCOUNTER (EMERGENCY)
Facility: HOSPITAL | Age: 24
Discharge: HOME | End: 2024-11-30
Attending: EMERGENCY MEDICINE

## 2024-11-30 VITALS
DIASTOLIC BLOOD PRESSURE: 83 MMHG | BODY MASS INDEX: 23.59 KG/M2 | HEART RATE: 89 BPM | TEMPERATURE: 98.4 F | OXYGEN SATURATION: 99 % | SYSTOLIC BLOOD PRESSURE: 129 MMHG | WEIGHT: 129 LBS | RESPIRATION RATE: 20 BRPM

## 2024-11-30 DIAGNOSIS — Z76.89 RETURN TO WORK EXAM: Primary | ICD-10-CM

## 2024-11-30 PROCEDURE — 99281 EMR DPT VST MAYX REQ PHY/QHP: CPT

## 2024-11-30 PROCEDURE — 99283 EMERGENCY DEPT VISIT LOW MDM: CPT | Performed by: EMERGENCY MEDICINE

## 2024-11-30 NOTE — Clinical Note
Kylah Goff was seen and treated in our emergency department on 11/30/2024.                Diagnosis:     Kylah  may return to school on return date.    She may return on this date: 11/30/2024         If you have any questions or concerns, please don't hesitate to call.      Jason Solares MD    ______________________________           _______________          _______________  Hospital Representative                              Date                                Time

## 2024-11-30 NOTE — ED PROVIDER NOTES
Time reflects when diagnosis was documented in both MDM as applicable and the Disposition within this note       Time User Action Codes Description Comment    11/30/2024  6:20 PM Jason Solares Add [Z76.89] Return to work exam           ED Disposition       ED Disposition   Discharge    Condition   Stable    Date/Time   Sat Nov 30, 2024  6:20 PM    Comment   Kylah Goff discharge to home/self care.                   Assessment & Plan       Medical Decision Making  Dental bite is cleared clinically finish course of antibiotics             Medications - No data to display    ED Risk Strat Scores                           SBIRT 20yo+      Flowsheet Row Most Recent Value   Initial Alcohol Screen: US AUDIT-C     1. How often do you have a drink containing alcohol? 0 Filed at: 11/30/2024 1814   2. How many drinks containing alcohol do you have on a typical day you are drinking?  0 Filed at: 11/30/2024 1814   3a. Male UNDER 65: How often do you have five or more drinks on one occasion? 0 Filed at: 11/30/2024 1814   3b. FEMALE Any Age, or MALE 65+: How often do you have 4 or more drinks on one occassion? 0 Filed at: 11/30/2024 1814   Audit-C Score 0 Filed at: 11/30/2024 1814   KARYNA: How many times in the past year have you...    Used an illegal drug or used a prescription medication for non-medical reasons? Never Filed at: 11/30/2024 1814                            History of Present Illness       Chief Complaint   Patient presents with    Letter for School/Work     Patient says she was here Thursday for conjunctivitis and her employer needs a note to say she can go back; reports she is taking medication appropriately       Past Medical History:   Diagnosis Date    Asthma     Endometriosis     GERD (gastroesophageal reflux disease)       Past Surgical History:   Procedure Laterality Date    LAPAROSCOPY      for endometriosis    OK EXC B9 LESION MRGN XCP SK TG F/E/E/N/L/M > 4.0CM N/A 2/25/2022    Procedure:  EXCISION OF FOREHEAD SUBCUTANEOUS LESION WITH COMPLEX CLOSURE;  Surgeon: Elkin Cade MD;  Location: [facility];  Service: Plastics      Family History   Problem Relation Age of Onset    Anxiety disorder Mother     Depression Mother     Asthma Mother     Asthma Brother     Stomach cancer Maternal Grandmother       Social History     Tobacco Use    Smoking status: Former     Types: Cigarettes     Passive exposure: Current    Smokeless tobacco: Never    Tobacco comments:     hookah socially    Vaping Use    Vaping status: Some Days    Substances: Flavoring   Substance Use Topics    Alcohol use: Not Currently    Drug use: Never      E-Cigarette/Vaping    E-Cigarette Use Current Some Day User     Comments vaping       E-Cigarette/Vaping Substances    Nicotine No     Flavoring Yes       I have reviewed and agree with the history as documented.     She was diagnosed with conjunctivitis 2 days ago placed on erythromycin to go back to work yesterday the day insisted on a return to work note she is not having more redness or drainage from her eye problems fever      History provided by:  Patient   used: Yes        Review of Systems   Constitutional:  Negative for chills and fever.   Eyes:  Negative for photophobia, pain, redness and visual disturbance.   Gastrointestinal:  Negative for vomiting.   Musculoskeletal:  Negative for neck pain.   Skin:  Negative for color change and rash.   Neurological:  Negative for seizures, syncope, weakness and numbness.   All other systems reviewed and are negative.          Objective       ED Triage Vitals [11/30/24 1818]   Temperature Pulse Blood Pressure Respirations SpO2 Patient Position - Orthostatic VS   98.4 °F (36.9 °C) 89 129/83 20 99 % Sitting      Temp Source Heart Rate Source BP Location FiO2 (%) Pain Score    Oral Monitor Left arm -- --      Vitals      Date and Time Temp Pulse SpO2 Resp BP Pain Score FACES Pain Rating User   11/30/24 1818 98.4 °F (36.9  °C) 89 99 % 20 129/83 -- -- ES            Physical Exam  Constitutional:       General: She is not in acute distress.     Appearance: Normal appearance. She is not ill-appearing or toxic-appearing.   Eyes:      General:         Right eye: No discharge.         Left eye: No discharge.      Extraocular Movements: Extraocular movements intact.      Conjunctiva/sclera: Conjunctivae normal.      Pupils: Pupils are equal, round, and reactive to light.   Cardiovascular:      Rate and Rhythm: Normal rate.   Pulmonary:      Effort: No respiratory distress.   Abdominal:      General: There is no distension.   Musculoskeletal:      Cervical back: Normal range of motion. No rigidity or tenderness.   Lymphadenopathy:      Cervical: No cervical adenopathy.   Neurological:      General: No focal deficit present.      Mental Status: She is alert.      Cranial Nerves: No cranial nerve deficit.         Results Reviewed       None            No orders to display       Procedures    ED Medication and Procedure Management   Prior to Admission Medications   Prescriptions Last Dose Informant Patient Reported? Taking?   Natural Senna Laxative 8.6 MG tablet   No No   Sig: TAKE ONE TABLET BY MOUTH TWICE A DAY AS NEEDED FOR CONSTIPATION   albuterol (2.5 mg/3 mL) 0.083 % nebulizer solution   No No   Sig: Take 3 mL (2.5 mg total) by nebulization every 6 (six) hours as needed for wheezing or shortness of breath   albuterol (PROVENTIL HFA,VENTOLIN HFA) 90 mcg/act inhaler   No No   Sig: INHALE 2 PUFFS BY MOUTH EVERY SIX HOURS AS NEEDED FOR WHEEZING   bisacodyl (FLEET) 10 MG/30ML ENEM   No No   Sig: Insert 30 mL (10 mg total) into the rectum once for 1 dose   erythromycin (ILOTYCIN) ophthalmic ointment   No No   Sig: Place a 1/2 inch ribbon of ointment into the lower eyelid four times a day for 7 days.   famotidine (PEPCID) 40 MG tablet   No No   Sig: Take 1 tablet (40 mg total) by mouth 2 (two) times a day   hydrocortisone 2.5 % cream   No No    Sig: Apply topically 2 (two) times a day   ibuprofen (MOTRIN) 800 mg tablet   No No   Sig: Take 1 tablet (800 mg total) by mouth 3 (three) times a day   loperamide (IMODIUM) 2 mg capsule   No No   Sig: Take 1 capsule (2 mg total) by mouth 4 (four) times a day as needed for diarrhea   omeprazole (PriLOSEC) 20 mg delayed release capsule   No No   Sig: Take 1 capsule (20 mg total) by mouth 2 (two) times a day for 14 days   sucralfate (CARAFATE) 1 g tablet   No No   Sig: TAKE ONE TABLET BY MOUTH FOUR TIMES A DAY BEFORE MEALS AS NEEDED   triamcinolone (KENALOG) 0.1 % ointment   No No   Sig: APPLY TOPICALLY TO AFFECTED AREA TWICE A DAY      Facility-Administered Medications: None     Patient's Medications   Discharge Prescriptions    No medications on file     No discharge procedures on file.  ED SEPSIS DOCUMENTATION   Time reflects when diagnosis was documented in both MDM as applicable and the Disposition within this note       Time User Action Codes Description Comment    11/30/2024  6:20 PM Jason Solares Add [Z76.89] Return to work exam                  Jason Solares MD  11/30/24 3431

## 2024-12-29 ENCOUNTER — HOSPITAL ENCOUNTER (EMERGENCY)
Facility: HOSPITAL | Age: 24
Discharge: HOME | End: 2024-12-29
Attending: EMERGENCY MEDICINE | Admitting: EMERGENCY MEDICINE

## 2024-12-29 VITALS
HEART RATE: 87 BPM | OXYGEN SATURATION: 99 % | RESPIRATION RATE: 20 BRPM | TEMPERATURE: 98.9 F | BODY MASS INDEX: 23.16 KG/M2 | DIASTOLIC BLOOD PRESSURE: 78 MMHG | SYSTOLIC BLOOD PRESSURE: 115 MMHG | WEIGHT: 126.6 LBS

## 2024-12-29 DIAGNOSIS — H10.9 CONJUNCTIVITIS: ICD-10-CM

## 2024-12-29 DIAGNOSIS — H10.9 CONJUNCTIVITIS OF LEFT EYE, UNSPECIFIED CONJUNCTIVITIS TYPE: Primary | ICD-10-CM

## 2024-12-29 PROCEDURE — 99284 EMERGENCY DEPT VISIT MOD MDM: CPT | Performed by: EMERGENCY MEDICINE

## 2024-12-29 PROCEDURE — 99282 EMERGENCY DEPT VISIT SF MDM: CPT

## 2024-12-29 RX ORDER — ERYTHROMYCIN 5 MG/G
OINTMENT OPHTHALMIC
Qty: 3.5 G | Refills: 0 | Status: SHIPPED | OUTPATIENT
Start: 2024-12-29

## 2024-12-29 NOTE — Clinical Note
Kylah Goff was seen and treated in our emergency department on 12/29/2024.                Diagnosis:     Kylah  may return to work on return date.    She may return on this date: 12/31/2024         If you have any questions or concerns, please don't hesitate to call.      Jeremy Go MD    ______________________________           _______________          _______________  Hospital Representative                              Date                                Time

## 2024-12-29 NOTE — ED PROVIDER NOTES
Time reflects when diagnosis was documented in both MDM as applicable and the Disposition within this note       Time User Action Codes Description Comment    12/29/2024  3:59 PM Jeremy Go Add [H10.9] Conjunctivitis of left eye, unspecified conjunctivitis type     12/29/2024  3:59 PM Jeremy Go Add [H10.9] Conjunctivitis           ED Disposition       ED Disposition   Discharge    Condition   Stable    Date/Time   Sun Dec 29, 2024  3:59 PM    Comment   Kylah Goff discharge to home/self care.                   Assessment & Plan       Medical Decision Making  Problems Addressed:  Conjunctivitis of left eye, unspecified conjunctivitis type: chronic illness or injury with exacerbation, progression, or side effects of treatment     Details: Reoccurrence.  No visual deficits.  Will write for abx, stressed need to follow up with ophtho.      Risk  Prescription drug management.             Medications - No data to display    ED Risk Strat Scores                          SBIRT 20yo+      Flowsheet Row Most Recent Value   Initial Alcohol Screen: US AUDIT-C     1. How often do you have a drink containing alcohol? 0 Filed at: 12/29/2024 1557   2. How many drinks containing alcohol do you have on a typical day you are drinking?  0 Filed at: 12/29/2024 1557   3a. Male UNDER 65: How often do you have five or more drinks on one occasion? 0 Filed at: 12/29/2024 1557   3b. FEMALE Any Age, or MALE 65+: How often do you have 4 or more drinks on one occassion? 0 Filed at: 12/29/2024 1557   Audit-C Score 0 Filed at: 12/29/2024 1557   KARYNA: How many times in the past year have you...    Used an illegal drug or used a prescription medication for non-medical reasons? Never Filed at: 12/29/2024 1557                            History of Present Illness       Chief Complaint   Patient presents with    Eye Redness     L eye       Past Medical History:   Diagnosis Date    Asthma     Endometriosis     GERD  (gastroesophageal reflux disease)       Past Surgical History:   Procedure Laterality Date    LAPAROSCOPY      for endometriosis    CA EXC B9 LESION MRGN XCP SK TG F/E/E/N/L/M > 4.0CM N/A 2/25/2022    Procedure: EXCISION OF FOREHEAD SUBCUTANEOUS LESION WITH COMPLEX CLOSURE;  Surgeon: Elkin Cade MD;  Location: [facility];  Service: Plastics      Family History   Problem Relation Age of Onset    Anxiety disorder Mother     Depression Mother     Asthma Mother     Asthma Brother     Stomach cancer Maternal Grandmother       Social History     Tobacco Use    Smoking status: Former     Types: Cigarettes     Passive exposure: Current    Smokeless tobacco: Never    Tobacco comments:     hookah socially    Vaping Use    Vaping status: Some Days    Substances: Flavoring   Substance Use Topics    Alcohol use: Not Currently    Drug use: Never      E-Cigarette/Vaping    E-Cigarette Use Current Some Day User     Comments vaping       E-Cigarette/Vaping Substances    Nicotine No     Flavoring Yes       I have reviewed and agree with the history as documented.     Patient is a 24-year-old female who presents with a reoccurrence of conjunctivitis on the left eye.  Seen previously for same. Symptoms returned after finishing abx gtt.  +itchy eyes with disharge, red.  No vision issues.  Only wears glasses, no contacts.  No fever, nasal congestion, ear pain.         Review of Systems   Constitutional: Negative.    HENT: Negative.     Eyes:  Positive for discharge, redness and itching.   Respiratory: Negative.     Cardiovascular: Negative.    Gastrointestinal: Negative.    Endocrine: Negative.    Genitourinary: Negative.    Musculoskeletal: Negative.    Skin: Negative.    Allergic/Immunologic: Negative.    Neurological: Negative.    Hematological: Negative.    Psychiatric/Behavioral: Negative.     All other systems reviewed and are negative.          Objective       ED Triage Vitals [12/29/24 1558]   Temperature Pulse Blood Pressure  Respirations SpO2 Patient Position - Orthostatic VS   98.9 °F (37.2 °C) 87 115/78 20 99 % Sitting      Temp Source Heart Rate Source BP Location FiO2 (%) Pain Score    Oral Monitor Left arm -- --      Vitals      Date and Time Temp Pulse SpO2 Resp BP Pain Score FACES Pain Rating User   12/29/24 1558 98.9 °F (37.2 °C) 87 99 % 20 115/78 -- -- RG            Physical Exam  Vitals and nursing note reviewed.   Constitutional:       Appearance: Normal appearance. She is normal weight.   HENT:      Head: Normocephalic and atraumatic.      Right Ear: Tympanic membrane, ear canal and external ear normal.      Left Ear: Tympanic membrane, ear canal and external ear normal.      Nose: Nose normal.      Mouth/Throat:      Mouth: Mucous membranes are moist.      Pharynx: Oropharynx is clear.   Eyes:      General: Lids are normal. Vision grossly intact. Gaze aligned appropriately. No allergic shiner, visual field deficit or scleral icterus.        Right eye: No foreign body, discharge or hordeolum.         Left eye: No foreign body, discharge or hordeolum.      Extraocular Movements: Extraocular movements intact.      Right eye: Normal extraocular motion and no nystagmus.      Left eye: Normal extraocular motion and no nystagmus.      Conjunctiva/sclera:      Left eye: Left conjunctiva is injected. No exudate or hemorrhage.  Cardiovascular:      Rate and Rhythm: Normal rate and regular rhythm.      Pulses: Normal pulses.   Pulmonary:      Effort: Pulmonary effort is normal.      Breath sounds: Normal breath sounds.   Musculoskeletal:      Cervical back: Normal range of motion and neck supple.   Skin:     General: Skin is warm and dry.      Capillary Refill: Capillary refill takes less than 2 seconds.   Neurological:      Mental Status: She is alert.         Results Reviewed       None            No orders to display       Procedures    ED Medication and Procedure Management   Prior to Admission Medications   Prescriptions Last  Dose Informant Patient Reported? Taking?   Natural Senna Laxative 8.6 MG tablet   No No   Sig: TAKE ONE TABLET BY MOUTH TWICE A DAY AS NEEDED FOR CONSTIPATION   albuterol (2.5 mg/3 mL) 0.083 % nebulizer solution   No No   Sig: Take 3 mL (2.5 mg total) by nebulization every 6 (six) hours as needed for wheezing or shortness of breath   albuterol (PROVENTIL HFA,VENTOLIN HFA) 90 mcg/act inhaler   No No   Sig: INHALE 2 PUFFS BY MOUTH EVERY SIX HOURS AS NEEDED FOR WHEEZING   erythromycin (ILOTYCIN) ophthalmic ointment   No No   Sig: Place a 1/2 inch ribbon of ointment into the lower eyelid four times a day for 7 days.   erythromycin (ILOTYCIN) ophthalmic ointment   No Yes   Sig: Place a 1/2 inch ribbon of ointment into the lower eyelid four times a day for 7 days.   famotidine (PEPCID) 40 MG tablet   No No   Sig: Take 1 tablet (40 mg total) by mouth 2 (two) times a day   hydrocortisone 2.5 % cream   No No   Sig: Apply topically 2 (two) times a day   ibuprofen (MOTRIN) 800 mg tablet   No No   Sig: Take 1 tablet (800 mg total) by mouth 3 (three) times a day   loperamide (IMODIUM) 2 mg capsule   No No   Sig: Take 1 capsule (2 mg total) by mouth 4 (four) times a day as needed for diarrhea   omeprazole (PriLOSEC) 20 mg delayed release capsule   No No   Sig: Take 1 capsule (20 mg total) by mouth 2 (two) times a day for 14 days   sucralfate (CARAFATE) 1 g tablet   No No   Sig: TAKE ONE TABLET BY MOUTH FOUR TIMES A DAY BEFORE MEALS AS NEEDED   triamcinolone (KENALOG) 0.1 % ointment   No No   Sig: APPLY TOPICALLY TO AFFECTED AREA TWICE A DAY      Facility-Administered Medications: None     Current Discharge Medication List        CONTINUE these medications which have CHANGED    Details   erythromycin (ILOTYCIN) ophthalmic ointment Place a 1/2 inch ribbon of ointment into the lower eyelid four times a day for 7 days.  Qty: 3.5 g, Refills: 0    Associated Diagnoses: Conjunctivitis           CONTINUE these medications which have NOT  CHANGED    Details   albuterol (2.5 mg/3 mL) 0.083 % nebulizer solution Take 3 mL (2.5 mg total) by nebulization every 6 (six) hours as needed for wheezing or shortness of breath  Qty: 90 mL, Refills: 2    Associated Diagnoses: Mild intermittent asthma without complication      albuterol (PROVENTIL HFA,VENTOLIN HFA) 90 mcg/act inhaler INHALE 2 PUFFS BY MOUTH EVERY SIX HOURS AS NEEDED FOR WHEEZING  Qty: 6.7 g, Refills: 0    Comments: Substitution to a formulary equivalent within the same pharmaceutical class is authorized.  Associated Diagnoses: Mild intermittent asthma without complication      famotidine (PEPCID) 40 MG tablet Take 1 tablet (40 mg total) by mouth 2 (two) times a day  Qty: 60 tablet, Refills: 3    Associated Diagnoses: H. pylori infection; Gastroesophageal reflux disease without esophagitis      hydrocortisone 2.5 % cream Apply topically 2 (two) times a day  Qty: 28 g, Refills: 1    Associated Diagnoses: Eczema, unspecified type      ibuprofen (MOTRIN) 800 mg tablet Take 1 tablet (800 mg total) by mouth 3 (three) times a day  Qty: 21 tablet, Refills: 0    Associated Diagnoses: Diarrhea, unspecified type      loperamide (IMODIUM) 2 mg capsule Take 1 capsule (2 mg total) by mouth 4 (four) times a day as needed for diarrhea  Qty: 12 capsule, Refills: 0    Associated Diagnoses: Diarrhea      Natural Senna Laxative 8.6 MG tablet TAKE ONE TABLET BY MOUTH TWICE A DAY AS NEEDED FOR CONSTIPATION  Qty: 60 tablet, Refills: 0    Associated Diagnoses: Constipation, unspecified constipation type      omeprazole (PriLOSEC) 20 mg delayed release capsule Take 1 capsule (20 mg total) by mouth 2 (two) times a day for 14 days  Qty: 28 capsule, Refills: 0    Associated Diagnoses: Generalized abdominal pain      sucralfate (CARAFATE) 1 g tablet TAKE ONE TABLET BY MOUTH FOUR TIMES A DAY BEFORE MEALS AS NEEDED  Qty: 120 tablet, Refills: 0    Associated Diagnoses: Generalized abdominal pain      triamcinolone (KENALOG) 0.1 %  ointment APPLY TOPICALLY TO AFFECTED AREA TWICE A DAY  Qty: 80 g, Refills: 0    Associated Diagnoses: Eczema, unspecified type           No discharge procedures on file.  ED SEPSIS DOCUMENTATION   Time reflects when diagnosis was documented in both MDM as applicable and the Disposition within this note       Time User Action Codes Description Comment    12/29/2024  3:59 PM Jeremy Go Add [H10.9] Conjunctivitis of left eye, unspecified conjunctivitis type     12/29/2024  3:59 PM Jeremy Go Add [H10.9] Conjunctivitis                  Jeremy Go MD  12/29/24 3744

## 2025-01-12 ENCOUNTER — RESULTS FOLLOW-UP (OUTPATIENT)
Dept: EMERGENCY DEPT | Facility: HOSPITAL | Age: 25
End: 2025-01-12

## 2025-01-12 ENCOUNTER — HOSPITAL ENCOUNTER (EMERGENCY)
Facility: HOSPITAL | Age: 25
Discharge: HOME | End: 2025-01-12
Attending: EMERGENCY MEDICINE

## 2025-01-12 VITALS
WEIGHT: 129.63 LBS | SYSTOLIC BLOOD PRESSURE: 113 MMHG | BODY MASS INDEX: 23.71 KG/M2 | HEART RATE: 80 BPM | TEMPERATURE: 97.9 F | DIASTOLIC BLOOD PRESSURE: 73 MMHG | OXYGEN SATURATION: 100 % | RESPIRATION RATE: 16 BRPM

## 2025-01-12 DIAGNOSIS — N39.0 UTI (URINARY TRACT INFECTION): Primary | ICD-10-CM

## 2025-01-12 LAB
BACTERIA UR QL AUTO: ABNORMAL /HPF
BILIRUB UR QL STRIP: NEGATIVE
CLARITY UR: ABNORMAL
COLOR UR: ABNORMAL
EXT PREGNANCY TEST URINE: NEGATIVE
EXT. CONTROL: NORMAL
GLUCOSE UR STRIP-MCNC: NEGATIVE MG/DL
HGB UR QL STRIP.AUTO: 150
KETONES UR STRIP-MCNC: NEGATIVE MG/DL
LEUKOCYTE ESTERASE UR QL STRIP: 500
NITRITE UR QL STRIP: POSITIVE
NON-SQ EPI CELLS URNS QL MICRO: ABNORMAL /HPF
PH UR STRIP.AUTO: 6 [PH]
PROT UR STRIP-MCNC: ABNORMAL MG/DL
RBC #/AREA URNS AUTO: ABNORMAL /HPF
SP GR UR STRIP.AUTO: 1.01 (ref 1–1.04)
UROBILINOGEN UA: 1 MG/DL
WBC #/AREA URNS AUTO: ABNORMAL /HPF

## 2025-01-12 PROCEDURE — 87186 SC STD MICRODIL/AGAR DIL: CPT

## 2025-01-12 PROCEDURE — 99283 EMERGENCY DEPT VISIT LOW MDM: CPT

## 2025-01-12 PROCEDURE — 81003 URINALYSIS AUTO W/O SCOPE: CPT

## 2025-01-12 PROCEDURE — 81025 URINE PREGNANCY TEST: CPT

## 2025-01-12 PROCEDURE — 87086 URINE CULTURE/COLONY COUNT: CPT

## 2025-01-12 PROCEDURE — 81001 URINALYSIS AUTO W/SCOPE: CPT

## 2025-01-12 PROCEDURE — 87077 CULTURE AEROBIC IDENTIFY: CPT

## 2025-01-12 PROCEDURE — 99284 EMERGENCY DEPT VISIT MOD MDM: CPT | Performed by: EMERGENCY MEDICINE

## 2025-01-12 RX ORDER — CEPHALEXIN 500 MG/1
500 CAPSULE ORAL EVERY 6 HOURS SCHEDULED
Qty: 28 CAPSULE | Refills: 0 | Status: SHIPPED | OUTPATIENT
Start: 2025-01-12 | End: 2025-01-19

## 2025-01-12 NOTE — Clinical Note
Kylah Goff was seen and treated in our emergency department on 1/12/2025.    No restrictions            Diagnosis:     Kylah  may return to work on return date.    She may return on this date: 01/13/2025         If you have any questions or concerns, please don't hesitate to call.      Zahra Parks RN    ______________________________           _______________          _______________  Hospital Representative                              Date                                Time

## 2025-01-12 NOTE — ED PROVIDER NOTES
Time reflects when diagnosis was documented in both MDM as applicable and the Disposition within this note       Time User Action Codes Description Comment    1/12/2025  7:38 AM Wesley Mcgregor Add [N39.0] UTI (urinary tract infection)           ED Disposition       ED Disposition   Discharge    Condition   Stable    Date/Time   Sun Jan 12, 2025  7:38 AM    Comment   Kylah Goff discharge to home/self care.                   Assessment & Plan       Medical Decision Making  25-year-old female presents with dysuria  No abdominal tenderness noted on examination  No concern for appendicitis, pancreatitis, cholecystitis, hepatitis, small bowel suction, diverticulitis  Also no CVA tenderness not concerning for pyelonephritis  Urinalysis confirm UTI  Will treat with antibiotics  Patient given return precautions    Problems Addressed:  UTI (urinary tract infection): acute illness or injury    Risk  Prescription drug management.             Medications - No data to display    ED Risk Strat Scores                          SBIRT 20yo+      Flowsheet Row Most Recent Value   Initial Alcohol Screen: US AUDIT-C     1. How often do you have a drink containing alcohol? 0 Filed at: 01/12/2025 0712   2. How many drinks containing alcohol do you have on a typical day you are drinking?  0 Filed at: 01/12/2025 0712   3a. Male UNDER 65: How often do you have five or more drinks on one occasion? 0 Filed at: 01/12/2025 0712   3b. FEMALE Any Age, or MALE 65+: How often do you have 4 or more drinks on one occassion? 0 Filed at: 01/12/2025 0712   Audit-C Score 0 Filed at: 01/12/2025 0712   KARYNA: How many times in the past year have you...    Used an illegal drug or used a prescription medication for non-medical reasons? Never Filed at: 01/12/2025 0712                            History of Present Illness       Chief Complaint   Patient presents with    Possible UTI     Since yesterday         Past Medical History:   Diagnosis Date     Asthma     Endometriosis     GERD (gastroesophageal reflux disease)       Past Surgical History:   Procedure Laterality Date    LAPAROSCOPY      for endometriosis    UT EXC B9 LESION MRGN XCP SK TG F/E/E/N/L/M > 4.0CM N/A 2/25/2022    Procedure: EXCISION OF FOREHEAD SUBCUTANEOUS LESION WITH COMPLEX CLOSURE;  Surgeon: Elkin Cade MD;  Location: [facility];  Service: Plastics      Family History   Problem Relation Age of Onset    Anxiety disorder Mother     Depression Mother     Asthma Mother     Asthma Brother     Stomach cancer Maternal Grandmother       Social History     Tobacco Use    Smoking status: Former     Types: Cigarettes     Passive exposure: Current    Smokeless tobacco: Never    Tobacco comments:     hookah socially    Vaping Use    Vaping status: Some Days    Substances: Flavoring   Substance Use Topics    Alcohol use: Not Currently    Drug use: Never      E-Cigarette/Vaping    E-Cigarette Use Current Some Day User     Comments vaping       E-Cigarette/Vaping Substances    Nicotine No     Flavoring Yes       I have reviewed and agree with the history as documented.     HPI  25-year-old female presents with dysuria.  Patient states that she has had UTIs in the past and this feels exactly the same as before.  Denies any fever, chills, nausea, vomiting, diarrhea.  Reports no other complaints.    Review of Systems   Constitutional:  Negative for chills, diaphoresis, fever and unexpected weight change.   HENT:  Negative for ear pain and sore throat.    Eyes:  Negative for visual disturbance.   Respiratory:  Negative for cough, chest tightness and shortness of breath.    Cardiovascular:  Negative for chest pain and leg swelling.   Gastrointestinal:  Negative for abdominal distention, abdominal pain, constipation, diarrhea, nausea and vomiting.   Endocrine: Negative.    Genitourinary:  Positive for dysuria. Negative for difficulty urinating.   Musculoskeletal: Negative.    Skin: Negative.     Allergic/Immunologic: Negative.    Neurological: Negative.    Hematological: Negative.    Psychiatric/Behavioral: Negative.     All other systems reviewed and are negative.          Objective       ED Triage Vitals [01/12/25 0705]   Temperature Pulse Blood Pressure Respirations SpO2 Patient Position - Orthostatic VS   97.9 °F (36.6 °C) 80 113/73 16 100 % Sitting      Temp Source Heart Rate Source BP Location FiO2 (%) Pain Score    Oral Monitor Left arm -- --      Vitals      Date and Time Temp Pulse SpO2 Resp BP Pain Score FACES Pain Rating User   01/12/25 0705 97.9 °F (36.6 °C) 80 100 % 16 113/73 -- --             Physical Exam  Vitals and nursing note reviewed.   Constitutional:       General: She is not in acute distress.     Appearance: Normal appearance. She is not ill-appearing.   HENT:      Head: Normocephalic and atraumatic.      Right Ear: External ear normal.      Left Ear: External ear normal.      Nose: Nose normal.      Mouth/Throat:      Mouth: Mucous membranes are moist.      Pharynx: Oropharynx is clear.   Eyes:      General: No scleral icterus.        Right eye: No discharge.         Left eye: No discharge.      Extraocular Movements: Extraocular movements intact.      Conjunctiva/sclera: Conjunctivae normal.      Pupils: Pupils are equal, round, and reactive to light.   Cardiovascular:      Rate and Rhythm: Normal rate and regular rhythm.      Pulses: Normal pulses.      Heart sounds: Normal heart sounds.   Pulmonary:      Effort: Pulmonary effort is normal.      Breath sounds: Normal breath sounds.   Abdominal:      General: Abdomen is flat. Bowel sounds are normal. There is no distension.      Palpations: Abdomen is soft.      Tenderness: There is no abdominal tenderness. There is no guarding or rebound.   Musculoskeletal:         General: Normal range of motion.      Cervical back: Normal range of motion and neck supple.   Skin:     General: Skin is warm and dry.      Capillary Refill:  Capillary refill takes less than 2 seconds.   Neurological:      General: No focal deficit present.      Mental Status: She is alert and oriented to person, place, and time. Mental status is at baseline.   Psychiatric:         Mood and Affect: Mood normal.         Behavior: Behavior normal.         Thought Content: Thought content normal.         Judgment: Judgment normal.         Results Reviewed       Procedure Component Value Units Date/Time    Urine Microscopic [[id number]]  (Abnormal) Collected: 01/12/25 0718    Lab Status: Final result Specimen: Urine, Clean Catch Updated: 01/12/25 0808     RBC, UA 4-10 /hpf      WBC, UA Innumerable /hpf      Epithelial Cells Occasional /hpf      Bacteria, UA Innumerable /hpf     Urine culture [[id number]] Collected: 01/12/25 0718    Lab Status: In process Specimen: Urine, Clean Catch Updated: 01/12/25 0807    UA w Reflex to Microscopic w Reflex to Culture [[id number]]  (Abnormal) Collected: 01/12/25 0718    Lab Status: Final result Specimen: Urine, Clean Catch Updated: 01/12/25 0732     Color, UA Irasema     Clarity, UA Cloudy     Specific Gravity, UA 1.015     pH, UA 6.0     Leukocytes, .0     Nitrite, UA Positive     Protein,  (2+) mg/dl      Glucose, UA Negative mg/dl      Ketones, UA Negative mg/dl      Bilirubin, UA Negative     Occult Blood, .0     UROBILINOGEN UA 1.0 mg/dL     POCT pregnancy, urine [[id number]]  (Normal) Collected: 01/12/25 0721    Lab Status: Final result Updated: 01/12/25 0721     EXT Preg Test, Ur Negative     Control Valid            No orders to display       Procedures    ED Medication and Procedure Management   Prior to Admission Medications   Prescriptions Last Dose Informant Patient Reported? Taking?   Natural Senna Laxative 8.6 MG tablet   No No   Sig: TAKE ONE TABLET BY MOUTH TWICE A DAY AS NEEDED FOR CONSTIPATION   albuterol (2.5 mg/3 mL) 0.083 % nebulizer solution   No No   Sig: Take 3 mL (2.5 mg total) by nebulization every 6  (six) hours as needed for wheezing or shortness of breath   albuterol (PROVENTIL HFA,VENTOLIN HFA) 90 mcg/act inhaler   No No   Sig: INHALE 2 PUFFS BY MOUTH EVERY SIX HOURS AS NEEDED FOR WHEEZING   erythromycin (ILOTYCIN) ophthalmic ointment   No No   Sig: Place a 1/2 inch ribbon of ointment into the lower eyelid four times a day for 7 days.   famotidine (PEPCID) 40 MG tablet   No No   Sig: Take 1 tablet (40 mg total) by mouth 2 (two) times a day   hydrocortisone 2.5 % cream   No No   Sig: Apply topically 2 (two) times a day   ibuprofen (MOTRIN) 800 mg tablet   No No   Sig: Take 1 tablet (800 mg total) by mouth 3 (three) times a day   loperamide (IMODIUM) 2 mg capsule   No No   Sig: Take 1 capsule (2 mg total) by mouth 4 (four) times a day as needed for diarrhea   omeprazole (PriLOSEC) 20 mg delayed release capsule   No No   Sig: Take 1 capsule (20 mg total) by mouth 2 (two) times a day for 14 days   sucralfate (CARAFATE) 1 g tablet   No No   Sig: TAKE ONE TABLET BY MOUTH FOUR TIMES A DAY BEFORE MEALS AS NEEDED   triamcinolone (KENALOG) 0.1 % ointment   No No   Sig: APPLY TOPICALLY TO AFFECTED AREA TWICE A DAY      Facility-Administered Medications: None     Discharge Medication List as of 1/12/2025  7:38 AM        START taking these medications    Details   cephalexin (KEFLEX) 500 mg capsule Take 1 capsule (500 mg total) by mouth every 6 (six) hours for 7 days, Starting Sun 1/12/2025, Until Sun 1/19/2025, Normal           CONTINUE these medications which have NOT CHANGED    Details   albuterol (2.5 mg/3 mL) 0.083 % nebulizer solution Take 3 mL (2.5 mg total) by nebulization every 6 (six) hours as needed for wheezing or shortness of breath, Starting Wed 4/10/2024, Normal      albuterol (PROVENTIL HFA,VENTOLIN HFA) 90 mcg/act inhaler INHALE 2 PUFFS BY MOUTH EVERY SIX HOURS AS NEEDED FOR WHEEZING, Normal      erythromycin (ILOTYCIN) ophthalmic ointment Place a 1/2 inch ribbon of ointment into the lower eyelid four  times a day for 7 days., Normal      famotidine (PEPCID) 40 MG tablet Take 1 tablet (40 mg total) by mouth 2 (two) times a day, Starting Wed 8/7/2024, Normal      hydrocortisone 2.5 % cream Apply topically 2 (two) times a day, Starting Wed 4/10/2024, Normal      ibuprofen (MOTRIN) 800 mg tablet Take 1 tablet (800 mg total) by mouth 3 (three) times a day, Starting Sun 5/19/2024, Normal      loperamide (IMODIUM) 2 mg capsule Take 1 capsule (2 mg total) by mouth 4 (four) times a day as needed for diarrhea, Starting Sun 6/9/2024, Normal      Natural Senna Laxative 8.6 MG tablet TAKE ONE TABLET BY MOUTH TWICE A DAY AS NEEDED FOR CONSTIPATION, Normal      omeprazole (PriLOSEC) 20 mg delayed release capsule Take 1 capsule (20 mg total) by mouth 2 (two) times a day for 14 days, Starting Mon 7/15/2024, Until Mon 7/29/2024, Normal      sucralfate (CARAFATE) 1 g tablet TAKE ONE TABLET BY MOUTH FOUR TIMES A DAY BEFORE MEALS AS NEEDED, Normal      triamcinolone (KENALOG) 0.1 % ointment APPLY TOPICALLY TO AFFECTED AREA TWICE A DAY, Normal           No discharge procedures on file.  ED SEPSIS DOCUMENTATION   Time reflects when diagnosis was documented in both MDM as applicable and the Disposition within this note       Time User Action Codes Description Comment    1/12/2025  7:38 AM Wesley Mcgregor Add [N39.0] UTI (urinary tract infection)                  Wesley Mcgregor MD  01/12/25 0840

## 2025-01-12 NOTE — Clinical Note
Kylah Goff was seen and treated in our emergency department on 1/12/2025.    No restrictions            Diagnosis:     Kylah  may return to work on return date.    She may return on this date: 01/12/2025         If you have any questions or concerns, please don't hesitate to call.      Zahra Parks RN    ______________________________           _______________          _______________  Hospital Representative                              Date                                Time done

## 2025-01-14 LAB — BACTERIA UR CULT: ABNORMAL

## 2025-03-24 ENCOUNTER — HOSPITAL ENCOUNTER (EMERGENCY)
Facility: HOSPITAL | Age: 25
Discharge: HOME | End: 2025-03-24
Attending: EMERGENCY MEDICINE

## 2025-03-24 VITALS
OXYGEN SATURATION: 100 % | WEIGHT: 129.19 LBS | SYSTOLIC BLOOD PRESSURE: 138 MMHG | DIASTOLIC BLOOD PRESSURE: 80 MMHG | HEART RATE: 79 BPM | TEMPERATURE: 97 F | BODY MASS INDEX: 23.63 KG/M2 | RESPIRATION RATE: 16 BRPM

## 2025-03-24 DIAGNOSIS — S03.00XA DISLOCATION OF TEMPOROMANDIBULAR JOINT, INITIAL ENCOUNTER: Primary | ICD-10-CM

## 2025-03-24 LAB
EXT PREGNANCY TEST URINE: NEGATIVE
EXT. CONTROL: NORMAL

## 2025-03-24 PROCEDURE — 81025 URINE PREGNANCY TEST: CPT | Performed by: EMERGENCY MEDICINE

## 2025-03-24 PROCEDURE — 99283 EMERGENCY DEPT VISIT LOW MDM: CPT

## 2025-03-24 PROCEDURE — 99284 EMERGENCY DEPT VISIT MOD MDM: CPT | Performed by: EMERGENCY MEDICINE

## 2025-03-24 RX ORDER — IBUPROFEN 600 MG/1
600 TABLET, FILM COATED ORAL EVERY 6 HOURS PRN
Qty: 24 TABLET | Refills: 0 | Status: SHIPPED | OUTPATIENT
Start: 2025-03-24

## 2025-03-24 RX ORDER — IBUPROFEN 600 MG/1
600 TABLET, FILM COATED ORAL ONCE
Status: COMPLETED | OUTPATIENT
Start: 2025-03-24 | End: 2025-03-24

## 2025-03-24 RX ADMIN — IBUPROFEN 600 MG: 600 TABLET, FILM COATED ORAL at 09:27

## 2025-03-24 NOTE — Clinical Note
Kylah Goff was seen and treated in our emergency department on 3/24/2025.                Diagnosis:     Kylah  .    She may return on this date: 03/26/2025         If you have any questions or concerns, please don't hesitate to call.      Jason Solares MD    ______________________________           _______________          _______________  Hospital Representative                              Date                                Time

## 2025-03-24 NOTE — ED PROVIDER NOTES
Time reflects when diagnosis was documented in both MDM as applicable and the Disposition within this note       Time User Action Codes Description Comment    3/24/2025  9:04 AM Jason Solares Add [S03.00XA] Dislocation of temporomandibular joint, initial encounter           ED Disposition       ED Disposition   Discharge    Condition   Stable    Date/Time   Mon Mar 24, 2025  9:20 AM    Comment   Kylah Goff discharge to home/self care.                   Assessment & Plan       Medical Decision Making  No signs of dental abscess no signs of parotitis lateral TMJ pain most likely is TMJ will continue anti-inflammatories referred to oral maxillofacial    Amount and/or Complexity of Data Reviewed  Labs: ordered.    Risk  Prescription drug management.             Medications - No data to display    ED Risk Strat Scores                            SBIRT 22yo+      Flowsheet Row Most Recent Value   Initial Alcohol Screen: US AUDIT-C     1. How often do you have a drink containing alcohol? 0 Filed at: 03/24/2025 0856   2. How many drinks containing alcohol do you have on a typical day you are drinking?  0 Filed at: 03/24/2025 0856   3a. Male UNDER 65: How often do you have five or more drinks on one occasion? 0 Filed at: 03/24/2025 0856   3b. FEMALE Any Age, or MALE 65+: How often do you have 4 or more drinks on one occassion? 0 Filed at: 03/24/2025 0856   Audit-C Score 0 Filed at: 03/24/2025 0856   KARYNA: How many times in the past year have you...    Used an illegal drug or used a prescription medication for non-medical reasons? Never Filed at: 03/24/2025 0856                            History of Present Illness       Chief Complaint   Patient presents with    Facial Swelling     Pt reports facial pain and swelling around jaw area x4 days. Taking ibuprofen.        Past Medical History:   Diagnosis Date    Asthma     Endometriosis     GERD (gastroesophageal reflux disease)       Past Surgical History:    Procedure Laterality Date    LAPAROSCOPY      for endometriosis    CO EXC B9 LESION MRGN XCP SK TG F/E/E/N/L/M > 4.0CM N/A 2/25/2022    Procedure: EXCISION OF FOREHEAD SUBCUTANEOUS LESION WITH COMPLEX CLOSURE;  Surgeon: Elkin Cade MD;  Location: [facility];  Service: Plastics      Family History   Problem Relation Age of Onset    Anxiety disorder Mother     Depression Mother     Asthma Mother     Asthma Brother     Stomach cancer Maternal Grandmother       Social History     Tobacco Use    Smoking status: Former     Types: Cigarettes     Passive exposure: Current    Smokeless tobacco: Never    Tobacco comments:     hookah socially    Vaping Use    Vaping status: Some Days    Substances: Flavoring   Substance Use Topics    Alcohol use: Not Currently    Drug use: Never      E-Cigarette/Vaping    E-Cigarette Use Current Some Day User     Comments vaping       E-Cigarette/Vaping Substances    Nicotine No     Flavoring Yes       I have reviewed and agree with the history as documented.     Patient with 4 days of bilateral jaw discomfort hurts when she chews really unclear if she saw dentist she told triage nurse she did not and she told me she did and that she said he did not say anything to her taking 40 mg of Motrin 3 times a day without relief denies any difficulty breathing or swallowing no fevers no tooth pain          Review of Systems   Constitutional:  Negative for chills and fever.   HENT:  Negative for ear pain and sore throat.    Eyes:  Negative for redness.   Cardiovascular:  Negative for chest pain.   Gastrointestinal:  Negative for vomiting.   Skin:  Negative for color change and rash.   Neurological:  Negative for seizures.   All other systems reviewed and are negative.          Objective       ED Triage Vitals [03/24/25 0855]   Temperature Pulse Blood Pressure Respirations SpO2 Patient Position - Orthostatic VS   (!) 97 °F (36.1 °C) 79 138/80 16 100 % Sitting      Temp Source Heart Rate Source BP  Location FiO2 (%) Pain Score    Oral Monitor Left arm -- --      Vitals      Date and Time Temp Pulse SpO2 Resp BP Pain Score FACES Pain Rating User   03/24/25 0855 97 °F (36.1 °C) 79 100 % 16 138/80 -- -- TW            Physical Exam  Vitals and nursing note reviewed.   Constitutional:       General: She is not in acute distress.     Appearance: She is well-developed. She is not ill-appearing, toxic-appearing or diaphoretic.   HENT:      Head: Normocephalic and atraumatic.      Nose: Nose normal.      Mouth/Throat:      Mouth: Mucous membranes are moist.      Pharynx: No oropharyngeal exudate or posterior oropharyngeal erythema.      Comments: Positive click on both TMJs opening closing her mouth reproduces the pain her mandible there is no swelling erythema tender over the TMJ no point tenderness over the teeth  Eyes:      Extraocular Movements: Extraocular movements intact.      Conjunctiva/sclera: Conjunctivae normal.      Pupils: Pupils are equal, round, and reactive to light.   Cardiovascular:      Rate and Rhythm: Normal rate.   Pulmonary:      Effort: Pulmonary effort is normal. No respiratory distress.   Abdominal:      General: There is no distension.      Palpations: Abdomen is soft.   Musculoskeletal:         General: No swelling.      Cervical back: Normal range of motion and neck supple. No rigidity or tenderness.   Skin:     General: Skin is warm and dry.      Capillary Refill: Capillary refill takes less than 2 seconds.   Neurological:      General: No focal deficit present.      Mental Status: She is alert.      Cranial Nerves: No cranial nerve deficit.      Comments: oriented   Psychiatric:         Mood and Affect: Mood normal.         Results Reviewed       Procedure Component Value Units Date/Time    POCT pregnancy, urine [[id number]]  (Normal) Collected: 03/24/25 0913    Lab Status: Final result Updated: 03/24/25 0913     EXT Preg Test, Ur Negative     Control Valid            No orders to display        Procedures    ED Medication and Procedure Management   Prior to Admission Medications   Prescriptions Last Dose Informant Patient Reported? Taking?   Natural Senna Laxative 8.6 MG tablet   No No   Sig: TAKE ONE TABLET BY MOUTH TWICE A DAY AS NEEDED FOR CONSTIPATION   albuterol (2.5 mg/3 mL) 0.083 % nebulizer solution   No No   Sig: Take 3 mL (2.5 mg total) by nebulization every 6 (six) hours as needed for wheezing or shortness of breath   albuterol (PROVENTIL HFA,VENTOLIN HFA) 90 mcg/act inhaler   No No   Sig: INHALE 2 PUFFS BY MOUTH EVERY SIX HOURS AS NEEDED FOR WHEEZING   erythromycin (ILOTYCIN) ophthalmic ointment   No No   Sig: Place a 1/2 inch ribbon of ointment into the lower eyelid four times a day for 7 days.   famotidine (PEPCID) 40 MG tablet   No No   Sig: Take 1 tablet (40 mg total) by mouth 2 (two) times a day   hydrocortisone 2.5 % cream   No No   Sig: Apply topically 2 (two) times a day   ibuprofen (MOTRIN) 800 mg tablet   No No   Sig: Take 1 tablet (800 mg total) by mouth 3 (three) times a day   loperamide (IMODIUM) 2 mg capsule   No No   Sig: Take 1 capsule (2 mg total) by mouth 4 (four) times a day as needed for diarrhea   omeprazole (PriLOSEC) 20 mg delayed release capsule   No No   Sig: Take 1 capsule (20 mg total) by mouth 2 (two) times a day for 14 days   sucralfate (CARAFATE) 1 g tablet   No No   Sig: TAKE ONE TABLET BY MOUTH FOUR TIMES A DAY BEFORE MEALS AS NEEDED   triamcinolone (KENALOG) 0.1 % ointment   No No   Sig: APPLY TOPICALLY TO AFFECTED AREA TWICE A DAY      Facility-Administered Medications: None     Patient's Medications   Discharge Prescriptions    IBUPROFEN (MOTRIN) 600 MG TABLET    Take 1 tablet (600 mg total) by mouth every 6 (six) hours as needed for mild pain       Start Date: 3/24/2025 End Date: --       Order Dose: 600 mg       Quantity: 24 tablet    Refills: 0     No discharge procedures on file.  ED SEPSIS DOCUMENTATION   Time reflects when diagnosis was documented  in both MDM as applicable and the Disposition within this note       Time User Action Codes Description Comment    3/24/2025  9:04 AM Jason Solares Add [S03.00XA] Dislocation of temporomandibular joint, initial encounter                  Jason Solares MD  03/24/25 8125

## 2025-04-02 ENCOUNTER — HOSPITAL ENCOUNTER (EMERGENCY)
Facility: HOSPITAL | Age: 25
Discharge: HOME | End: 2025-04-02
Attending: EMERGENCY MEDICINE

## 2025-04-02 ENCOUNTER — APPOINTMENT (EMERGENCY)
Dept: RADIOLOGY | Facility: HOSPITAL | Age: 25
End: 2025-04-02

## 2025-04-02 ENCOUNTER — APPOINTMENT (EMERGENCY)
Dept: CT IMAGING | Facility: HOSPITAL | Age: 25
End: 2025-04-02

## 2025-04-02 VITALS
BODY MASS INDEX: 23.96 KG/M2 | TEMPERATURE: 98.4 F | RESPIRATION RATE: 18 BRPM | SYSTOLIC BLOOD PRESSURE: 129 MMHG | DIASTOLIC BLOOD PRESSURE: 79 MMHG | WEIGHT: 131 LBS | OXYGEN SATURATION: 100 % | HEART RATE: 95 BPM

## 2025-04-02 DIAGNOSIS — M25.551 RIGHT HIP PAIN: Primary | ICD-10-CM

## 2025-04-02 LAB
EXT PREGNANCY TEST URINE: NEGATIVE
EXT. CONTROL: NORMAL

## 2025-04-02 PROCEDURE — 81025 URINE PREGNANCY TEST: CPT | Performed by: EMERGENCY MEDICINE

## 2025-04-02 PROCEDURE — 72192 CT PELVIS W/O DYE: CPT

## 2025-04-02 PROCEDURE — 99284 EMERGENCY DEPT VISIT MOD MDM: CPT | Performed by: EMERGENCY MEDICINE

## 2025-04-02 PROCEDURE — 73502 X-RAY EXAM HIP UNI 2-3 VIEWS: CPT

## 2025-04-02 PROCEDURE — 99284 EMERGENCY DEPT VISIT MOD MDM: CPT

## 2025-04-02 RX ORDER — IBUPROFEN 800 MG/1
800 TABLET, FILM COATED ORAL 3 TIMES DAILY
Qty: 21 TABLET | Refills: 0 | Status: SHIPPED | OUTPATIENT
Start: 2025-04-02

## 2025-04-02 RX ORDER — ACETAMINOPHEN 325 MG/1
975 TABLET ORAL ONCE
Status: COMPLETED | OUTPATIENT
Start: 2025-04-02 | End: 2025-04-02

## 2025-04-02 RX ORDER — IBUPROFEN 400 MG/1
800 TABLET, FILM COATED ORAL ONCE
Status: COMPLETED | OUTPATIENT
Start: 2025-04-02 | End: 2025-04-02

## 2025-04-02 RX ADMIN — IBUPROFEN 800 MG: 400 TABLET ORAL at 18:09

## 2025-04-02 RX ADMIN — ACETAMINOPHEN 975 MG: 325 TABLET, FILM COATED ORAL at 18:09

## 2025-04-02 NOTE — ED PROVIDER NOTES
Time reflects when diagnosis was documented in both MDM as applicable and the Disposition within this note       Time User Action Codes Description Comment    4/2/2025  8:09 PM Jason Solares Add [M25.551] Right hip pain           ED Disposition       ED Disposition   Discharge    Condition   Stable    Date/Time   Wed Apr 2, 2025  8:09 PM    Comment   Kylah Toscanoesha [place] discharge to home/self care.                   Assessment & Plan       Medical Decision Making  Presenting with slip and fall down a few steps complaining of isolated right hip pain she would not ambulate x-rays were negative CT was then done that shows no fracture we discharged on crutches and anti-inflammatories follow-up with Ortho nbeurovascular intact    Amount and/or Complexity of Data Reviewed  Labs: ordered.  Radiology: ordered.    Risk  OTC drugs.  Prescription drug management.             Medications   acetaminophen (TYLENOL) tablet 975 mg (975 mg Oral Given 4/2/25 1809)   ibuprofen (MOTRIN) tablet 800 mg (800 mg Oral Given 4/2/25 1809)       ED Risk Strat Scores                            SBIRT 20yo+      Flowsheet Row Most Recent Value   Initial Alcohol Screen: US AUDIT-C     1. How often do you have a drink containing alcohol? 0 Filed at: 04/02/2025 1811   2. How many drinks containing alcohol do you have on a typical day you are drinking?  0 Filed at: 04/02/2025 1811   3a. Male UNDER 65: How often do you have five or more drinks on one occasion? 0 Filed at: 04/02/2025 1811   3b. FEMALE Any Age, or MALE 65+: How often do you have 4 or more drinks on one occassion? 0 Filed at: 04/02/2025 1811   Audit-C Score 0 Filed at: 04/02/2025 1811   KARYNA: How many times in the past year have you...    Used an illegal drug or used a prescription medication for non-medical reasons? Never Filed at: 04/02/2025 1811                            History of Present Illness       Chief Complaint   Patient presents with    Hip Injury     Fell down stairs  falling onto R hip - denies head strike        Past Medical History:   Diagnosis Date    Asthma     Endometriosis     GERD (gastroesophageal reflux disease)       Past Surgical History:   Procedure Laterality Date    LAPAROSCOPY      for endometriosis    DE EXC B9 LESION MRGN XCP SK TG F/E/E/N/L/M > 4.0CM N/A 2/25/2022    Procedure: EXCISION OF FOREHEAD SUBCUTANEOUS LESION WITH COMPLEX CLOSURE;  Surgeon: Elkin Cade MD;  Location: [facility];  Service: Plastics      Family History   Problem Relation Age of Onset    Anxiety disorder Mother     Depression Mother     Asthma Mother     Asthma Brother     Stomach cancer Maternal Grandmother       Social History     Tobacco Use    Smoking status: Former     Types: Cigarettes     Passive exposure: Current    Smokeless tobacco: Never    Tobacco comments:     hookah socially    Vaping Use    Vaping status: Some Days    Substances: Flavoring   Substance Use Topics    Alcohol use: Not Currently    Drug use: Never      E-Cigarette/Vaping    E-Cigarette Use Current Some Day User     Comments vaping       E-Cigarette/Vaping Substances    Nicotine No     Flavoring Yes       I have reviewed and agree with the history as documented.     Patient slipped and fell down a few stairs injuring her right hip 5 minutes before arrival to the emergency department she did not come by EMS she complaining of right hip pain no head injury no neck injury no back injury no loss of consciousness no weakness or lower extremity no bowel or bladder problem            Review of Systems   Constitutional:  Negative for chills and fever.   Respiratory:  Negative for shortness of breath.    Cardiovascular:  Negative for chest pain.   Gastrointestinal:  Negative for abdominal pain and vomiting.   Genitourinary:  Negative for hematuria.   Musculoskeletal:  Negative for back pain and neck pain.   Skin:  Negative for color change and rash.   Neurological:  Negative for seizures, syncope, weakness and  numbness.   All other systems reviewed and are negative.          Objective       ED Triage Vitals   Temperature Pulse Blood Pressure Respirations SpO2 Patient Position - Orthostatic VS   04/02/25 1748 04/02/25 1748 04/02/25 1748 04/02/25 1748 04/02/25 1748 04/02/25 1748   98.4 °F (36.9 °C) 95 129/79 18 100 % Sitting      Temp Source Heart Rate Source BP Location FiO2 (%) Pain Score    04/02/25 1748 04/02/25 1748 04/02/25 1748 -- 04/02/25 1809    Oral Monitor Left arm  10 - Worst Possible Pain      Vitals      Date and Time Temp Pulse SpO2 Resp BP Pain Score FACES Pain Rating User   04/02/25 2010 -- -- -- -- -- 5 -- MG   04/02/25 1809 -- -- -- -- -- 10 - Worst Possible Pain -- TW   04/02/25 1748 98.4 °F (36.9 °C) 95 100 % 18 129/79 -- -- MICHAEL            Physical Exam  Vitals and nursing note reviewed.   Constitutional:       General: She is not in acute distress.     Appearance: She is well-developed. She is not ill-appearing, toxic-appearing or diaphoretic.   HENT:      Head: Normocephalic and atraumatic.   Eyes:      Conjunctiva/sclera: Conjunctivae normal.   Cardiovascular:      Rate and Rhythm: Normal rate and regular rhythm.      Heart sounds: No murmur heard.  Pulmonary:      Effort: Pulmonary effort is normal. No respiratory distress.      Breath sounds: Normal breath sounds.   Abdominal:      General: There is no distension.      Palpations: Abdomen is soft.      Tenderness: There is no abdominal tenderness. There is no right CVA tenderness, left CVA tenderness, guarding or rebound.   Musculoskeletal:         General: No swelling.      Cervical back: Normal range of motion and neck supple. No rigidity or tenderness.      Comments: Patient points to her posterior right hip there is no bruising there she has full range of motion hip with flexion extension internal/external rotation with some discomfort with movement neurovascular intact    Skin:     General: Skin is warm and dry.      Capillary Refill: Capillary  refill takes less than 2 seconds.   Neurological:      General: No focal deficit present.      Mental Status: She is alert.      Sensory: No sensory deficit.      Motor: No weakness.   Psychiatric:         Mood and Affect: Mood normal.         Results Reviewed       Procedure Component Value Units Date/Time    POCT pregnancy, urine [[id number]]  (Normal) Collected: 04/02/25 1810    Lab Status: Final result Updated: 04/02/25 1810     EXT Preg Test, Ur Negative     Control Valid            CT pelvis wo contrast   Final Interpretation by Miky Benítez MD (2001)      No evidence of pelvic or hip fracture.      Workstation performed: [id number]         XR hip/pelv 2-3 vws right    (Results Pending)       Procedures    ED Medication and Procedure Management   Prior to Admission Medications   Prescriptions Last Dose Informant Patient Reported? Taking?   Natural Senna Laxative 8.6 MG tablet   No No   Sig: TAKE ONE TABLET BY MOUTH TWICE A DAY AS NEEDED FOR CONSTIPATION   albuterol (2.5 mg/3 mL) 0.083 % nebulizer solution   No No   Sig: Take 3 mL (2.5 mg total) by nebulization every 6 (six) hours as needed for wheezing or shortness of breath   albuterol (PROVENTIL HFA,VENTOLIN HFA) 90 mcg/act inhaler   No No   Sig: INHALE 2 PUFFS BY MOUTH EVERY SIX HOURS AS NEEDED FOR WHEEZING   erythromycin (ILOTYCIN) ophthalmic ointment   No No   Sig: Place a 1/2 inch ribbon of ointment into the lower eyelid four times a day for 7 days.   famotidine (PEPCID) 40 MG tablet   No No   Sig: Take 1 tablet (40 mg total) by mouth 2 (two) times a day   hydrocortisone 2.5 % cream   No No   Sig: Apply topically 2 (two) times a day   ibuprofen (MOTRIN) 600 mg tablet   No No   Sig: Take 1 tablet (600 mg total) by mouth every 6 (six) hours as needed for mild pain   ibuprofen (MOTRIN) 800 mg tablet   No No   Sig: Take 1 tablet (800 mg total) by mouth 3 (three) times a day   loperamide (IMODIUM) 2 mg capsule   No No   Sig: Take 1 capsule (2 mg  total) by mouth 4 (four) times a day as needed for diarrhea   omeprazole (PriLOSEC) 20 mg delayed release capsule   No No   Sig: Take 1 capsule (20 mg total) by mouth 2 (two) times a day for 14 days   sucralfate (CARAFATE) 1 g tablet   No No   Sig: TAKE ONE TABLET BY MOUTH FOUR TIMES A DAY BEFORE MEALS AS NEEDED   triamcinolone (KENALOG) 0.1 % ointment   No No   Sig: APPLY TOPICALLY TO AFFECTED AREA TWICE A DAY      Facility-Administered Medications: None     Discharge Medication List as of 4/2/2025  8:10 PM        START taking these medications    Details   !! ibuprofen (MOTRIN) 800 mg tablet Take 1 tablet (800 mg total) by mouth 3 (three) times a day, Starting Wed 4/2/2025, Normal       !! - Potential duplicate medications found. Please discuss with provider.        CONTINUE these medications which have NOT CHANGED    Details   albuterol (2.5 mg/3 mL) 0.083 % nebulizer solution Take 3 mL (2.5 mg total) by nebulization every 6 (six) hours as needed for wheezing or shortness of breath, Starting Wed 4/10/2024, Normal      albuterol (PROVENTIL HFA,VENTOLIN HFA) 90 mcg/act inhaler INHALE 2 PUFFS BY MOUTH EVERY SIX HOURS AS NEEDED FOR WHEEZING, Normal      erythromycin (ILOTYCIN) ophthalmic ointment Place a 1/2 inch ribbon of ointment into the lower eyelid four times a day for 7 days., Normal      famotidine (PEPCID) 40 MG tablet Take 1 tablet (40 mg total) by mouth 2 (two) times a day, Starting Wed 8/7/2024, Normal      hydrocortisone 2.5 % cream Apply topically 2 (two) times a day, Starting Wed 4/10/2024, Normal      !! ibuprofen (MOTRIN) 600 mg tablet Take 1 tablet (600 mg total) by mouth every 6 (six) hours as needed for mild pain, Starting Mon 3/24/2025, Normal      !! ibuprofen (MOTRIN) 800 mg tablet Take 1 tablet (800 mg total) by mouth 3 (three) times a day, Starting Sun 5/19/2024, Normal      loperamide (IMODIUM) 2 mg capsule Take 1 capsule (2 mg total) by mouth 4 (four) times a day as needed for diarrhea,  Starting Sun 6/9/2024, Normal      Natural Senna Laxative 8.6 MG tablet TAKE ONE TABLET BY MOUTH TWICE A DAY AS NEEDED FOR CONSTIPATION, Normal      omeprazole (PriLOSEC) 20 mg delayed release capsule Take 1 capsule (20 mg total) by mouth 2 (two) times a day for 14 days, Starting Mon 7/15/2024, Until Mon 7/29/2024, Normal      sucralfate (CARAFATE) 1 g tablet TAKE ONE TABLET BY MOUTH FOUR TIMES A DAY BEFORE MEALS AS NEEDED, Normal      triamcinolone (KENALOG) 0.1 % ointment APPLY TOPICALLY TO AFFECTED AREA TWICE A DAY, Normal       !! - Potential duplicate medications found. Please discuss with provider.        No discharge procedures on file.  ED SEPSIS DOCUMENTATION   Time reflects when diagnosis was documented in both MDM as applicable and the Disposition within this note       Time User Action Codes Description Comment    4/2/2025  8:09 PM Jason Solares Add [M25.551] Right hip pain                  Jason Solares MD  04/02/25 1707

## 2025-04-02 NOTE — Clinical Note
Kylah Goff was seen and treated in our emergency department on 4/2/2025.                Diagnosis:     Kylah  .    She may return on this date: 04/03/2025         If you have any questions or concerns, please don't hesitate to call.      Jason Solares MD    ______________________________           _______________          _______________  Hospital Representative                              Date                                Time

## 2025-05-27 ENCOUNTER — HOSPITAL ENCOUNTER (EMERGENCY)
Facility: HOSPITAL | Age: 25
Discharge: HOME | End: 2025-05-27
Attending: EMERGENCY MEDICINE | Admitting: EMERGENCY MEDICINE

## 2025-05-27 VITALS
DIASTOLIC BLOOD PRESSURE: 64 MMHG | SYSTOLIC BLOOD PRESSURE: 110 MMHG | BODY MASS INDEX: 24.67 KG/M2 | WEIGHT: 134.9 LBS | HEART RATE: 88 BPM | TEMPERATURE: 97.4 F | OXYGEN SATURATION: 98 % | RESPIRATION RATE: 18 BRPM

## 2025-05-27 DIAGNOSIS — R51.9 HEADACHE: Primary | ICD-10-CM

## 2025-05-27 LAB
EXT PREGNANCY TEST URINE: NEGATIVE
EXT. CONTROL: NORMAL

## 2025-05-27 PROCEDURE — 99283 EMERGENCY DEPT VISIT LOW MDM: CPT

## 2025-05-27 PROCEDURE — 96375 TX/PRO/DX INJ NEW DRUG ADDON: CPT

## 2025-05-27 PROCEDURE — 96374 THER/PROPH/DIAG INJ IV PUSH: CPT

## 2025-05-27 PROCEDURE — 81025 URINE PREGNANCY TEST: CPT

## 2025-05-27 PROCEDURE — 99284 EMERGENCY DEPT VISIT MOD MDM: CPT

## 2025-05-27 PROCEDURE — 96361 HYDRATE IV INFUSION ADD-ON: CPT

## 2025-05-27 RX ORDER — DIPHENHYDRAMINE HYDROCHLORIDE 50 MG/ML
25 INJECTION, SOLUTION INTRAMUSCULAR; INTRAVENOUS ONCE
Status: COMPLETED | OUTPATIENT
Start: 2025-05-27 | End: 2025-05-27

## 2025-05-27 RX ORDER — KETOROLAC TROMETHAMINE 30 MG/ML
30 INJECTION, SOLUTION INTRAMUSCULAR; INTRAVENOUS ONCE
Status: COMPLETED | OUTPATIENT
Start: 2025-05-27 | End: 2025-05-27

## 2025-05-27 RX ORDER — METOCLOPRAMIDE HYDROCHLORIDE 5 MG/ML
10 INJECTION INTRAMUSCULAR; INTRAVENOUS ONCE
Status: COMPLETED | OUTPATIENT
Start: 2025-05-27 | End: 2025-05-27

## 2025-05-27 RX ADMIN — SODIUM CHLORIDE 1000 ML: 0.9 INJECTION, SOLUTION INTRAVENOUS at 05:41

## 2025-05-27 RX ADMIN — DIPHENHYDRAMINE HYDROCHLORIDE 25 MG: 50 INJECTION, SOLUTION INTRAMUSCULAR; INTRAVENOUS at 05:40

## 2025-05-27 RX ADMIN — METOCLOPRAMIDE 10 MG: 5 INJECTION, SOLUTION INTRAMUSCULAR; INTRAVENOUS at 05:41

## 2025-05-27 RX ADMIN — KETOROLAC TROMETHAMINE 30 MG: 30 INJECTION, SOLUTION INTRAMUSCULAR; INTRAVENOUS at 05:47

## 2025-05-27 NOTE — Clinical Note
Kylah Goff was seen and treated in our emergency department on 5/27/2025.                Diagnosis:     Kylah  may return to work on return date.    She may return on this date: 05/28/2025         If you have any questions or concerns, please don't hesitate to call.      Brianda Torres PA-C    ______________________________           _______________          _______________  Hospital Representative                              Date                                Time

## 2025-05-27 NOTE — ED PROVIDER NOTES
Time reflects when diagnosis was documented in both MDM as applicable and the Disposition within this note       Time User Action Codes Description Comment    5/27/2025  5:50 AM Brianda Torres Add [R51.9] Headache           ED Disposition       ED Disposition   Discharge    Condition   Stable    Date/Time   Tue May 27, 2025  6:38 AM    Comment   Kylah Goff discharge to home/self care.                   Assessment & Plan         Medical Decision Making  Patient with a reported history of right-sided headaches presents with a headache, photophobia, and N/V.  She is alert and oriented x4 with a GCS of 15 and no focal neurologic deficits.  No tachycardia or fever concerning for an infectious process.  Differential diagnosis includes but is not limited to migraine headache, tension headache, cluster headache, sinusitis, or viral syndrome; doubt acute intracranial hemorrhage or meningitis.  No fever, neck pain, or meningismus on exam to suspect meningitis.  Patient given a migraine cocktail with significant relief.  Recommend she keep a headache diary and follow up with her PCP.  Given her report of frequent right-sided headaches with photophobia, will also place a referral to neurology for suspected migraines.  Patient is in agreement with the treatment plan and all questions were answered.  She verbalized understanding of the strict return precautions.  Follow-up with PCP and/or neurology, but return to the ED in the interim with new or worsening symptoms.    Amount and/or Complexity of Data Reviewed  Labs: ordered.    Risk  Prescription drug management.        Medications   ketorolac (TORADOL) injection 30 mg (30 mg Intravenous Given 5/27/25 0547)   metoclopramide (REGLAN) injection 10 mg (10 mg Intravenous Given 5/27/25 0541)   diphenhydrAMINE (BENADRYL) injection 25 mg (25 mg Intravenous Given 5/27/25 0540)   sodium chloride 0.9 % bolus 1,000 mL (1,000 mL Intravenous New Bag 5/27/25 0541)       ED Risk  Strat Scores          History of Present Illness       Chief Complaint   Patient presents with    Headache     Pt reports two days of headache and vomiting.  Taking topomax and ibuprofen, last taken last night.  NO other symptoms.        Past Medical History[1]   Past Surgical History[2]   Family History[3]   Social History[4]   E-Cigarette/Vaping    E-Cigarette Use Current Some Day User     Comments vaping       E-Cigarette/Vaping Substances    Nicotine No     Flavoring Yes       I have reviewed and agree with the history as documented.     Patient is a 25-year-old female with a past medical history of GERD, H. pylori, asthma, eczema, and endometriosis, who presents for evaluation of a headache.  She reports two days of a right-sided headache with associated photophobia, nausea, vomiting, and epigastric discomfort.  She does endorse a history of frequent right-sided headaches, but she has never been diagnosed with migraines.  No associated lightheadedness, room-spinning, dizziness, diplopia, extremity weakness, paresthesias, neck pain, neck stiffness, URI symptoms, diarrhea, or F/C.  She has been taking ibuprofen and her mother's topamax without relief.      History provided by:  Medical records and patient   used: No          Review of Systems   Constitutional:  Negative for chills and fever.   HENT:  Negative for congestion, ear pain, rhinorrhea and sore throat.    Eyes:  Positive for photophobia. Negative for pain and visual disturbance.   Respiratory:  Negative for cough and shortness of breath.    Cardiovascular:  Negative for chest pain and palpitations.   Gastrointestinal:  Positive for abdominal pain, nausea and vomiting. Negative for diarrhea.   Genitourinary:  Negative for dysuria and hematuria.   Musculoskeletal:  Negative for arthralgias, back pain, myalgias, neck pain and neck stiffness.   Skin:  Negative for color change and rash.   Neurological:  Positive for headaches. Negative  for dizziness, seizures, syncope, weakness, light-headedness and numbness.   All other systems reviewed and are negative.      Objective       ED Triage Vitals [05/27/25 0521]   Temperature Pulse Blood Pressure Respirations SpO2 Patient Position - Orthostatic VS   (!) 97.4 °F (36.3 °C) 88 110/64 18 98 % Sitting      Temp Source Heart Rate Source BP Location FiO2 (%) Pain Score    Oral Monitor Left arm -- 10 - Worst Possible Pain      Vitals      Date and Time Temp Pulse SpO2 Resp BP Pain Score FACES Pain Rating User   05/27/25 0549 -- -- -- -- -- 10 - Worst Possible Pain -- JLZ   05/27/25 0521 97.4 °F (36.3 °C) 88 98 % 18 110/64 10 - Worst Possible Pain -- TR            Physical Exam  Vitals and nursing note reviewed.   Constitutional:       General: She is awake. She is not in acute distress.     Appearance: Normal appearance. She is well-developed and normal weight. She is not toxic-appearing or diaphoretic.   HENT:      Head: Normocephalic and atraumatic.      Right Ear: External ear normal.      Left Ear: External ear normal.      Nose: Nose normal. No congestion or rhinorrhea.      Mouth/Throat:      Lips: Pink.      Mouth: Mucous membranes are moist.      Tongue: Tongue does not deviate from midline.      Pharynx: Oropharynx is clear. Uvula midline.     Eyes:      General: Lids are normal. Vision grossly intact. Gaze aligned appropriately. No visual field deficit.     Extraocular Movements: Extraocular movements intact.      Right eye: No nystagmus.      Left eye: No nystagmus.      Conjunctiva/sclera: Conjunctivae normal.      Pupils: Pupils are equal, round, and reactive to light.     Neck:      Meningeal: Brudzinski's sign and Kernig's sign absent.     Cardiovascular:      Rate and Rhythm: Regular rhythm.      Heart sounds: Normal heart sounds, S1 normal and S2 normal. No murmur heard.     No friction rub. No gallop.   Pulmonary:      Effort: Pulmonary effort is normal. No respiratory distress.      Breath  sounds: Normal breath sounds and air entry. No wheezing, rhonchi or rales.   Abdominal:      General: Abdomen is flat. Bowel sounds are normal. There is no distension.      Palpations: Abdomen is soft. There is no mass.      Tenderness: There is abdominal tenderness (minimal) in the epigastric area. There is no guarding or rebound.     Musculoskeletal:      Cervical back: Normal, full passive range of motion without pain and neck supple. No rigidity or crepitus. No spinous process tenderness or muscular tenderness.      Thoracic back: Normal. No spasms, tenderness or bony tenderness.      Lumbar back: Normal. No spasms, tenderness or bony tenderness.   Lymphadenopathy:      Head:      Right side of head: No submental, submandibular, tonsillar, preauricular or posterior auricular adenopathy.      Left side of head: No submental, submandibular, tonsillar, preauricular or posterior auricular adenopathy.      Cervical: No cervical adenopathy.     Skin:     General: Skin is warm and dry.      Capillary Refill: Capillary refill takes less than 2 seconds.      Coloration: Skin is not pale.      Findings: No rash.     Neurological:      General: No focal deficit present.      Mental Status: She is alert and oriented to person, place, and time.      GCS: GCS eye subscore is 4. GCS verbal subscore is 5. GCS motor subscore is 6.      Cranial Nerves: Cranial nerves 2-12 are intact. No dysarthria or facial asymmetry.      Sensory: Sensation is intact.      Motor: Motor function is intact. No weakness, tremor, atrophy, abnormal muscle tone or pronator drift.      Coordination: Coordination is intact. Rapid alternating movements normal.      Gait: Gait is intact. Gait normal.     Psychiatric:         Attention and Perception: Attention normal.         Mood and Affect: Mood normal.         Speech: Speech normal.         Behavior: Behavior normal. Behavior is cooperative.         Results Reviewed       Procedure Component Value  Units Date/Time    POCT pregnancy, urine [[id number]]  (Normal) Collected: 05/27/25 0540    Lab Status: Final result Specimen: Urine Updated: 05/27/25 0540     EXT Preg Test, Ur Negative     Control Valid            No orders to display       Procedures      ED Medication and Procedure Management   Prior to Admission Medications   Prescriptions Last Dose Informant Patient Reported? Taking?   Natural Senna Laxative 8.6 MG tablet   No No   Sig: TAKE ONE TABLET BY MOUTH TWICE A DAY AS NEEDED FOR CONSTIPATION   albuterol (2.5 mg/3 mL) 0.083 % nebulizer solution   No No   Sig: Take 3 mL (2.5 mg total) by nebulization every 6 (six) hours as needed for wheezing or shortness of breath   albuterol (PROVENTIL HFA,VENTOLIN HFA) 90 mcg/act inhaler   No No   Sig: INHALE 2 PUFFS BY MOUTH EVERY SIX HOURS AS NEEDED FOR WHEEZING   erythromycin (ILOTYCIN) ophthalmic ointment   No No   Sig: Place a 1/2 inch ribbon of ointment into the lower eyelid four times a day for 7 days.   famotidine (PEPCID) 40 MG tablet   No No   Sig: Take 1 tablet (40 mg total) by mouth 2 (two) times a day   hydrocortisone 2.5 % cream   No No   Sig: Apply topically 2 (two) times a day   ibuprofen (MOTRIN) 600 mg tablet   No No   Sig: Take 1 tablet (600 mg total) by mouth every 6 (six) hours as needed for mild pain   ibuprofen (MOTRIN) 800 mg tablet   No No   Sig: Take 1 tablet (800 mg total) by mouth 3 (three) times a day   ibuprofen (MOTRIN) 800 mg tablet   No No   Sig: Take 1 tablet (800 mg total) by mouth 3 (three) times a day   loperamide (IMODIUM) 2 mg capsule   No No   Sig: Take 1 capsule (2 mg total) by mouth 4 (four) times a day as needed for diarrhea   omeprazole (PriLOSEC) 20 mg delayed release capsule   No No   Sig: Take 1 capsule (20 mg total) by mouth 2 (two) times a day for 14 days   sucralfate (CARAFATE) 1 g tablet   No No   Sig: TAKE ONE TABLET BY MOUTH FOUR TIMES A DAY BEFORE MEALS AS NEEDED   triamcinolone (KENALOG) 0.1 % ointment   No No    Sig: APPLY TOPICALLY TO AFFECTED AREA TWICE A DAY      Facility-Administered Medications: None     Patient's Medications   Discharge Prescriptions    No medications on file       ED SEPSIS DOCUMENTATION   Time reflects when diagnosis was documented in both MDM as applicable and the Disposition within this note       Time User Action Codes Description Comment    5/27/2025  5:50 AM Brianda Torres Add [R51.9] Headache                      [1]   Past Medical History:  Diagnosis Date    Asthma     Endometriosis     GERD (gastroesophageal reflux disease)    [2]   Past Surgical History:  Procedure Laterality Date    LAPAROSCOPY      for endometriosis    MS EXC B9 LESION MRGN XCP SK TG F/E/E/N/L/M > 4.0CM N/A 2/25/2022    Procedure: EXCISION OF FOREHEAD SUBCUTANEOUS LESION WITH COMPLEX CLOSURE;  Surgeon: Elkin Cade MD;  Location: [facility];  Service: Plastics   [3]   Family History  Problem Relation Name Age of Onset    Anxiety disorder Mother      Depression Mother      Asthma Mother      Asthma Brother      Stomach cancer Maternal Grandmother     [4]   Social History  Tobacco Use    Smoking status: Former     Types: Cigarettes     Passive exposure: Current    Smokeless tobacco: Never    Tobacco comments:     hookah socially    Vaping Use    Vaping status: Some Days    Substances: Flavoring   Substance Use Topics    Alcohol use: Not Currently    Drug use: Never        Brianda Torres PA-C  05/27/25 0639

## 2025-06-20 ENCOUNTER — HOSPITAL ENCOUNTER (EMERGENCY)
Facility: HOSPITAL | Age: 25
Discharge: HOME | End: 2025-06-21
Attending: EMERGENCY MEDICINE

## 2025-06-20 ENCOUNTER — APPOINTMENT (EMERGENCY)
Dept: CT IMAGING | Facility: HOSPITAL | Age: 25
End: 2025-06-20

## 2025-06-20 VITALS
OXYGEN SATURATION: 100 % | RESPIRATION RATE: 18 BRPM | SYSTOLIC BLOOD PRESSURE: 109 MMHG | BODY MASS INDEX: 25 KG/M2 | DIASTOLIC BLOOD PRESSURE: 67 MMHG | HEART RATE: 67 BPM | WEIGHT: 136.69 LBS | TEMPERATURE: 98.3 F

## 2025-06-20 DIAGNOSIS — N39.0 UTI (URINARY TRACT INFECTION): ICD-10-CM

## 2025-06-20 DIAGNOSIS — R10.32 LEFT LOWER QUADRANT ABDOMINAL PAIN: Primary | ICD-10-CM

## 2025-06-20 LAB
ALBUMIN SERPL BCG-MCNC: 4.4 G/DL (ref 3.5–5)
ALP SERPL-CCNC: 53 U/L (ref 34–104)
ALT SERPL W P-5'-P-CCNC: 9 U/L (ref 7–52)
ANION GAP SERPL CALCULATED.3IONS-SCNC: 6 MMOL/L (ref 4–13)
AST SERPL W P-5'-P-CCNC: 15 U/L (ref 13–39)
BACTERIA UR QL AUTO: ABNORMAL /HPF
BASOPHILS # BLD AUTO: 0.05 THOUSANDS/ÂΜL (ref 0–0.1)
BASOPHILS NFR BLD AUTO: 1 % (ref 0–1)
BILIRUB SERPL-MCNC: 0.84 MG/DL (ref 0.2–1)
BILIRUB UR QL STRIP: NEGATIVE
BUN SERPL-MCNC: 7 MG/DL (ref 5–25)
CALCIUM SERPL-MCNC: 9 MG/DL (ref 8.4–10.2)
CHLORIDE SERPL-SCNC: 108 MMOL/L (ref 96–108)
CLARITY UR: ABNORMAL
CO2 SERPL-SCNC: 27 MMOL/L (ref 21–32)
COLOR UR: YELLOW
CREAT SERPL-MCNC: 0.65 MG/DL (ref 0.6–1.3)
EOSINOPHIL # BLD AUTO: 0.21 THOUSAND/ÂΜL (ref 0–0.61)
EOSINOPHIL NFR BLD AUTO: 4 % (ref 0–6)
ERYTHROCYTE [DISTWIDTH] IN BLOOD BY AUTOMATED COUNT: 11.9 % (ref 11.6–15.1)
EXT PREGNANCY TEST URINE: NEGATIVE
EXT. CONTROL: NORMAL
GFR SERPL CREATININE-BSD FRML MDRD: 124 ML/MIN/1.73SQ M
GLUCOSE SERPL-MCNC: 78 MG/DL (ref 65–140)
GLUCOSE UR STRIP-MCNC: NEGATIVE MG/DL
HCT VFR BLD AUTO: 40.1 % (ref 34.8–46.1)
HGB BLD-MCNC: 13.5 G/DL (ref 11.5–15.4)
HGB UR QL STRIP.AUTO: 250
IMM GRANULOCYTES # BLD AUTO: 0.01 THOUSAND/UL (ref 0–0.2)
IMM GRANULOCYTES NFR BLD AUTO: 0 % (ref 0–2)
KETONES UR STRIP-MCNC: NEGATIVE MG/DL
LEUKOCYTE ESTERASE UR QL STRIP: 100
LIPASE SERPL-CCNC: 38 U/L (ref 11–82)
LYMPHOCYTES # BLD AUTO: 1.88 THOUSANDS/ÂΜL (ref 0.6–4.47)
LYMPHOCYTES NFR BLD AUTO: 33 % (ref 14–44)
MCH RBC QN AUTO: 30.7 PG (ref 26.8–34.3)
MCHC RBC AUTO-ENTMCNC: 33.7 G/DL (ref 31.4–37.4)
MCV RBC AUTO: 91 FL (ref 82–98)
MONOCYTES # BLD AUTO: 0.5 THOUSAND/ÂΜL (ref 0.17–1.22)
MONOCYTES NFR BLD AUTO: 9 % (ref 4–12)
NEUTROPHILS # BLD AUTO: 3.06 THOUSANDS/ÂΜL (ref 1.85–7.62)
NEUTS SEG NFR BLD AUTO: 53 % (ref 43–75)
NITRITE UR QL STRIP: NEGATIVE
NON-SQ EPI CELLS URNS QL MICRO: ABNORMAL /HPF
NRBC BLD AUTO-RTO: 0 /100 WBCS
PH UR STRIP.AUTO: 8 [PH]
PLATELET # BLD AUTO: 225 THOUSANDS/UL (ref 149–390)
PMV BLD AUTO: 11.1 FL (ref 8.9–12.7)
POTASSIUM SERPL-SCNC: 3.7 MMOL/L (ref 3.5–5.3)
PROT SERPL-MCNC: 6.8 G/DL (ref 6.4–8.4)
PROT UR STRIP-MCNC: ABNORMAL MG/DL
RBC # BLD AUTO: 4.4 MILLION/UL (ref 3.81–5.12)
RBC #/AREA URNS AUTO: ABNORMAL /HPF
SODIUM SERPL-SCNC: 141 MMOL/L (ref 135–147)
SP GR UR STRIP.AUTO: 1.01 (ref 1–1.04)
UROBILINOGEN UA: NEGATIVE MG/DL
WBC # BLD AUTO: 5.71 THOUSAND/UL (ref 4.31–10.16)
WBC #/AREA URNS AUTO: ABNORMAL /HPF

## 2025-06-20 PROCEDURE — 87086 URINE CULTURE/COLONY COUNT: CPT

## 2025-06-20 PROCEDURE — 74177 CT ABD & PELVIS W/CONTRAST: CPT

## 2025-06-20 PROCEDURE — 87077 CULTURE AEROBIC IDENTIFY: CPT

## 2025-06-20 PROCEDURE — 81001 URINALYSIS AUTO W/SCOPE: CPT

## 2025-06-20 PROCEDURE — 81025 URINE PREGNANCY TEST: CPT

## 2025-06-20 PROCEDURE — 36415 COLL VENOUS BLD VENIPUNCTURE: CPT

## 2025-06-20 PROCEDURE — 96361 HYDRATE IV INFUSION ADD-ON: CPT

## 2025-06-20 PROCEDURE — 83690 ASSAY OF LIPASE: CPT

## 2025-06-20 PROCEDURE — 85025 COMPLETE CBC W/AUTO DIFF WBC: CPT

## 2025-06-20 PROCEDURE — 99285 EMERGENCY DEPT VISIT HI MDM: CPT

## 2025-06-20 PROCEDURE — 99284 EMERGENCY DEPT VISIT MOD MDM: CPT

## 2025-06-20 PROCEDURE — 96374 THER/PROPH/DIAG INJ IV PUSH: CPT

## 2025-06-20 PROCEDURE — 80053 COMPREHEN METABOLIC PANEL: CPT

## 2025-06-20 RX ORDER — KETOROLAC TROMETHAMINE 30 MG/ML
15 INJECTION, SOLUTION INTRAMUSCULAR; INTRAVENOUS ONCE
Status: COMPLETED | OUTPATIENT
Start: 2025-06-20 | End: 2025-06-20

## 2025-06-20 RX ORDER — CEPHALEXIN 500 MG/1
500 CAPSULE ORAL ONCE
Status: COMPLETED | OUTPATIENT
Start: 2025-06-20 | End: 2025-06-20

## 2025-06-20 RX ORDER — NAPROXEN 500 MG/1
500 TABLET ORAL 2 TIMES DAILY WITH MEALS
Qty: 30 TABLET | Refills: 0 | Status: SHIPPED | OUTPATIENT
Start: 2025-06-20

## 2025-06-20 RX ORDER — CEPHALEXIN 500 MG/1
500 CAPSULE ORAL EVERY 12 HOURS SCHEDULED
Qty: 10 CAPSULE | Refills: 0 | Status: SHIPPED | OUTPATIENT
Start: 2025-06-20 | End: 2025-06-25

## 2025-06-20 RX ADMIN — IOHEXOL 85 ML: 350 INJECTION, SOLUTION INTRAVENOUS at 22:22

## 2025-06-20 RX ADMIN — KETOROLAC TROMETHAMINE 15 MG: 30 INJECTION, SOLUTION INTRAMUSCULAR at 22:04

## 2025-06-20 RX ADMIN — CEPHALEXIN 500 MG: 500 CAPSULE ORAL at 23:38

## 2025-06-20 RX ADMIN — SODIUM CHLORIDE 1000 ML: 0.9 INJECTION, SOLUTION INTRAVENOUS at 22:04

## 2025-06-20 NOTE — Clinical Note
Kylah Goff was seen and treated in our emergency department on 6/20/2025.                Diagnosis:     Kylah  may return to work on return date.    She may return on this date: 06/21/2025         If you have any questions or concerns, please don't hesitate to call.      Brianda Torres PA-C    ______________________________           _______________          _______________  Hospital Representative                              Date                                Time No deformities present

## 2025-06-21 NOTE — ED PROVIDER NOTES
Time reflects when diagnosis was documented in both MDM as applicable and the Disposition within this note       Time User Action Codes Description Comment    6/20/2025 11:34 PM Brianda Torres Add [R10.32] Left lower quadrant abdominal pain     6/20/2025 11:34 PM Brianda Torres Add [N39.0] UTI (urinary tract infection)           ED Disposition       ED Disposition   Discharge    Condition   Stable    Date/Time   Fri Jun 20, 2025 11:56 PM    Comment   Kylah Goff discharge to home/self care.                   Assessment & Plan   {Hyperlinks  Risk Stratification - NIHSS - HEART SCORE - Fill out sepsis note and make sure you call [id number] if severe or septic shock:[id number]}      Medical Decision Making  Amount and/or Complexity of Data Reviewed  Labs: ordered. Decision-making details documented in ED Course.  Radiology: ordered. Decision-making details documented in ED Course.    Risk  Prescription drug management.        ED Course as of 06/21/25 0321   Fri Jun 20, 2025   2220 WBC, UA(!): 10-20   2220 Bacteria, UA(!): Innumerable   2325 CT abdomen pelvis with contrast  IMPRESSION:  No acute findings in the abdomen or pelvis.         Medications   sodium chloride 0.9 % bolus 1,000 mL (0 mL Intravenous Stopped 6/20/25 2337)   ketorolac (TORADOL) injection 15 mg (15 mg Intravenous Given 6/20/25 2204)   iohexol (OMNIPAQUE) 350 MG/ML injection (MULTI-DOSE) 85 mL (85 mL Intravenous Given 6/20/25 2222)   cephalexin (KEFLEX) capsule 500 mg (500 mg Oral Given 6/20/25 2338)       ED Risk Strat Scores        SBIRT 22yo+      Flowsheet Row Most Recent Value   Initial Alcohol Screen: US AUDIT-C     1. How often do you have a drink containing alcohol? 0 Filed at: 06/20/2025 2132   2. How many drinks containing alcohol do you have on a typical day you are drinking?  0 Filed at: 06/20/2025 2132   3a. Male UNDER 65: How often do you have five or more drinks on one occasion? 0 Filed at: 06/20/2025 2132   3b. FEMALE Any Age,  or MALE 65+: How often do you have 4 or more drinks on one occassion? 0 Filed at: 06/20/2025 2132   Audit-C Score 0 Filed at: 06/20/2025 2132   KARYNA: How many times in the past year have you...    Used an illegal drug or used a prescription medication for non-medical reasons? Never Filed at: 06/20/2025 2132              History of Present Illness   {Hyperlinks  History (Med, Surg, Fam, Social) - Current Medications - Allergies  :[id number]}    Chief Complaint   Patient presents with    Abdominal Pain     Pt reports sudden sharp pain LLQ that started today at 4. PT denies N/V/D.        Past Medical History[1]   Past Surgical History[2]   Family History[3]   Social History[4]   E-Cigarette/Vaping    E-Cigarette Use Current Some Day User     Comments vaping       E-Cigarette/Vaping Substances    Nicotine No     Flavoring Yes       I have reviewed and agree with the history as documented.     The patient is a 25-year-old female GERD, H. pylori, asthma, eczema, and endometriosis, who presents for evaluation of abdominal pain.  She reports developing a sudden onset of sharp left lower quadrant pain around 4 PM.  The pain has been constant since that time.  The pain does not radiate into the flank or back.  No associated nausea, vomiting, diarrhea, constipation, dysuria, urinary frequency, or F/C.  The patient is currently on her menstrual cycle which began yesterday.  No medications taken for pain PTA.  The patient has no history of abdominal surgeries in the past.          Review of Systems   Constitutional:  Negative for chills and fever.   HENT:  Negative for ear pain and sore throat.    Eyes:  Negative for pain and visual disturbance.   Respiratory:  Negative for cough and shortness of breath.    Cardiovascular:  Negative for chest pain and palpitations.   Gastrointestinal:  Positive for abdominal pain. Negative for abdominal distention, blood in stool, constipation, diarrhea, nausea and vomiting.   Genitourinary:   Negative for difficulty urinating, dysuria, flank pain, frequency and hematuria.   Musculoskeletal:  Negative for arthralgias, back pain and myalgias.   Skin:  Negative for color change and rash.   Neurological:  Negative for seizures and syncope.   All other systems reviewed and are negative.      Objective   {Hyperlinks  Historical Vitals - Historical Labs - Chart Review/Microbiology - Last Echo - Code Status  :[id number]}    ED Triage Vitals   Temperature Pulse Blood Pressure Respirations SpO2 Patient Position - Orthostatic VS   06/20/25 2134 06/20/25 2134 06/20/25 2134 06/20/25 2134 06/20/25 2134 06/20/25 2134   98.3 °F (36.8 °C) 89 125/84 20 98 % Sitting      Temp Source Heart Rate Source BP Location FiO2 (%) Pain Score    06/20/25 2134 06/20/25 2134 06/20/25 2134 -- 06/20/25 2204    Oral Monitor Left arm  9      Vitals      Date and Time Temp Pulse SpO2 Resp BP Pain Score FACES Pain Rating User   06/20/25 2340 -- 67 100 % 18 109/67 4 -- CFW   06/20/25 2204 -- -- -- -- -- 9 -- LH   06/20/25 2134 98.3 °F (36.8 °C) 89 98 % 20 125/84 -- -- KA            Physical Exam  Vitals and nursing note reviewed.   Constitutional:       General: She is awake.      Appearance: She is well-developed, well-groomed and normal weight. She is not toxic-appearing or diaphoretic.   HENT:      Head: Normocephalic and atraumatic.      Right Ear: External ear normal.      Left Ear: External ear normal.      Nose: Nose normal.      Mouth/Throat:      Lips: Pink.      Mouth: Mucous membranes are moist.      Pharynx: Oropharynx is clear. Uvula midline.     Eyes:      General: Lids are normal. Vision grossly intact. Gaze aligned appropriately. No scleral icterus.     Conjunctiva/sclera: Conjunctivae normal.      Pupils: Pupils are equal, round, and reactive to light.       Cardiovascular:      Rate and Rhythm: Normal rate and regular rhythm.      Heart sounds: S1 normal and S2 normal. No murmur heard.     No friction rub. No gallop.    Pulmonary:      Effort: Pulmonary effort is normal. No respiratory distress.      Breath sounds: Normal breath sounds and air entry. No wheezing, rhonchi or rales.   Abdominal:      General: Abdomen is flat. Bowel sounds are normal. There is no distension.      Palpations: Abdomen is soft. There is no mass.      Tenderness: There is abdominal tenderness in the left lower quadrant. There is guarding. There is no right CVA tenderness, left CVA tenderness or rebound.     Musculoskeletal:      Cervical back: Normal, full passive range of motion without pain and neck supple. No rigidity or crepitus. No spinous process tenderness or muscular tenderness.      Thoracic back: Normal. No spasms, tenderness or bony tenderness.      Lumbar back: Normal. No spasms, tenderness or bony tenderness.   Lymphadenopathy:      Cervical: No cervical adenopathy.     Skin:     General: Skin is warm.      Capillary Refill: Capillary refill takes less than 2 seconds.      Coloration: Skin is not pale.      Findings: No rash.     Neurological:      Mental Status: She is alert and oriented to person, place, and time.     Psychiatric:         Attention and Perception: Attention normal.         Mood and Affect: Mood normal.         Speech: Speech normal.         Behavior: Behavior normal. Behavior is cooperative.         Results Reviewed       Procedure Component Value Units Date/Time    Urine culture [[id number]] Collected: 06/20/25 2339    Lab Status: In process Specimen: Urine, Clean Catch Updated: 06/21/25 0003    Lipase [[id number]]  (Normal) Collected: 06/20/25 2204    Lab Status: Final result Specimen: Blood from Arm, Right Updated: 06/20/25 2226     Lipase 38 u/L     Comprehensive metabolic panel [[id number]] Collected: 06/20/25 2204    Lab Status: Final result Specimen: Blood from Arm, Right Updated: 06/20/25 2226     Sodium 141 mmol/L      Potassium 3.7 mmol/L      Chloride 108 mmol/L      CO2 27 mmol/L      ANION GAP 6 mmol/L      BUN  7 mg/dL      Creatinine 0.65 mg/dL      Glucose 78 mg/dL      Calcium 9.0 mg/dL      AST 15 U/L      ALT 9 U/L      Alkaline Phosphatase 53 U/L      Total Protein 6.8 g/dL      Albumin 4.4 g/dL      Total Bilirubin 0.84 mg/dL      eGFR 124 ml/min/1.73sq m     Narrative:      National Kid[employer] guidelines for Chronic Kidney Disease (CKD):     Stage 1 with normal or high GFR (GFR > 90 mL/min/1.73 square meters)    Stage 2 Mild CKD (GFR = 60-89 mL/min/1.73 square meters)    Stage 3A Moderate CKD (GFR = 45-59 mL/min/1.73 square meters)    Stage 3B Moderate CKD (GFR = 30-44 mL/min/1.73 square meters)    Stage 4 Severe CKD (GFR = 15-29 mL/min/1.73 square meters)    Stage 5 End Stage CKD (GFR <15 mL/min/1.73 square meters)  Note: GFR calculation is accurate only with a steady state creatinine    Urine Microscopic [[id number]]  (Abnormal) Collected: 06/20/25 2157    Lab Status: Final result Specimen: Urine, Clean Catch Updated: 06/20/25 2220     RBC, UA 1-2 /hpf      WBC, UA 10-20 /hpf      Epithelial Cells Moderate /hpf      Bacteria, UA Innumerable /hpf     CBC and differential [[id number]] Collected: 06/20/25 2204    Lab Status: Final result Specimen: Blood from Arm, Right Updated: 06/20/25 2211     WBC 5.71 Thousand/uL      RBC 4.40 Million/uL      Hemoglobin 13.5 g/dL      Hematocrit 40.1 %      MCV 91 fL      MCH 30.7 pg      MCHC 33.7 g/dL      RDW 11.9 %      MPV 11.1 fL      Platelets 225 Thousands/uL      nRBC 0 /100 WBCs      Segmented % 53 %      Immature Grans % 0 %      Lymphocytes % 33 %      Monocytes % 9 %      Eosinophils Relative 4 %      Basophils Relative 1 %      Absolute Neutrophils 3.06 Thousands/µL      Absolute Immature Grans 0.01 Thousand/uL      Absolute Lymphocytes 1.88 Thousands/µL      Absolute Monocytes 0.50 Thousand/µL      Eosinophils Absolute 0.21 Thousand/µL      Basophils Absolute 0.05 Thousands/µL     UA (URINE) with reflex to Scope [[id number]]  (Abnormal) Collected:  06/20/25 2157    Lab Status: Final result Specimen: Urine, Clean Catch Updated: 06/20/25 2208     Color, UA Yellow     Clarity, UA Slightly Cloudy     Specific Gravity, UA 1.010     pH, UA 8.0     Leukocytes, .0     Nitrite, UA Negative     Protein, UA 30 (1+) mg/dl      Glucose, UA Negative mg/dl      Ketones, UA Negative mg/dl      Bilirubin, UA Negative     Occult Blood, .0     UROBILINOGEN UA Negative mg/dL     POCT pregnancy, urine [[id number]]  (Normal) Collected: 06/20/25 2159    Lab Status: Final result Updated: 06/20/25 2159     EXT Preg Test, Ur Negative     Control Valid            CT abdomen pelvis with contrast   Final Interpretation by Adam Grant MD (06/20 2321)      No acute findings in the abdomen or pelvis.         Workstation performed: [id number]             Procedures    ED Medication and Procedure Management   Prior to Admission Medications   Prescriptions Last Dose Informant Patient Reported? Taking?   Natural Senna Laxative 8.6 MG tablet   No No   Sig: TAKE ONE TABLET BY MOUTH TWICE A DAY AS NEEDED FOR CONSTIPATION   albuterol (2.5 mg/3 mL) 0.083 % nebulizer solution   No No   Sig: Take 3 mL (2.5 mg total) by nebulization every 6 (six) hours as needed for wheezing or shortness of breath   albuterol (PROVENTIL HFA,VENTOLIN HFA) 90 mcg/act inhaler   No No   Sig: INHALE 2 PUFFS BY MOUTH EVERY SIX HOURS AS NEEDED FOR WHEEZING   erythromycin (ILOTYCIN) ophthalmic ointment   No No   Sig: Place a 1/2 inch ribbon of ointment into the lower eyelid four times a day for 7 days.   famotidine (PEPCID) 40 MG tablet   No No   Sig: Take 1 tablet (40 mg total) by mouth 2 (two) times a day   hydrocortisone 2.5 % cream   No No   Sig: Apply topically 2 (two) times a day   ibuprofen (MOTRIN) 600 mg tablet   No No   Sig: Take 1 tablet (600 mg total) by mouth every 6 (six) hours as needed for mild pain   ibuprofen (MOTRIN) 800 mg tablet   No No   Sig: Take 1 tablet (800 mg total) by mouth 3 (three)  times a day   ibuprofen (MOTRIN) 800 mg tablet   No No   Sig: Take 1 tablet (800 mg total) by mouth 3 (three) times a day   loperamide (IMODIUM) 2 mg capsule   No No   Sig: Take 1 capsule (2 mg total) by mouth 4 (four) times a day as needed for diarrhea   omeprazole (PriLOSEC) 20 mg delayed release capsule   No No   Sig: Take 1 capsule (20 mg total) by mouth 2 (two) times a day for 14 days   sucralfate (CARAFATE) 1 g tablet   No No   Sig: TAKE ONE TABLET BY MOUTH FOUR TIMES A DAY BEFORE MEALS AS NEEDED   triamcinolone (KENALOG) 0.1 % ointment   No No   Sig: APPLY TOPICALLY TO AFFECTED AREA TWICE A DAY      Facility-Administered Medications: None     Discharge Medication List as of 6/20/2025 11:56 PM        START taking these medications    Details   cephalexin (KEFLEX) 500 mg capsule Take 1 capsule (500 mg total) by mouth every 12 (twelve) hours for 5 days, Starting Fri 6/20/2025, Until Wed 6/25/2025, Normal      naproxen (Naprosyn) 500 mg tablet Take 1 tablet (500 mg total) by mouth 2 (two) times a day with meals, Starting Fri 6/20/2025, Normal           CONTINUE these medications which have NOT CHANGED    Details   albuterol (2.5 mg/3 mL) 0.083 % nebulizer solution Take 3 mL (2.5 mg total) by nebulization every 6 (six) hours as needed for wheezing or shortness of breath, Starting Wed 4/10/2024, Normal      albuterol (PROVENTIL HFA,VENTOLIN HFA) 90 mcg/act inhaler INHALE 2 PUFFS BY MOUTH EVERY SIX HOURS AS NEEDED FOR WHEEZING, Normal      erythromycin (ILOTYCIN) ophthalmic ointment Place a 1/2 inch ribbon of ointment into the lower eyelid four times a day for 7 days., Normal      famotidine (PEPCID) 40 MG tablet Take 1 tablet (40 mg total) by mouth 2 (two) times a day, Starting Wed 8/7/2024, Normal      hydrocortisone 2.5 % cream Apply topically 2 (two) times a day, Starting Wed 4/10/2024, Normal      !! ibuprofen (MOTRIN) 600 mg tablet Take 1 tablet (600 mg total) by mouth every 6 (six) hours as needed for mild  pain, Starting Mon 3/24/2025, Normal      !! ibuprofen (MOTRIN) 800 mg tablet Take 1 tablet (800 mg total) by mouth 3 (three) times a day, Starting Sun 5/19/2024, Normal      !! ibuprofen (MOTRIN) 800 mg tablet Take 1 tablet (800 mg total) by mouth 3 (three) times a day, Starting Wed 4/2/2025, Normal      loperamide (IMODIUM) 2 mg capsule Take 1 capsule (2 mg total) by mouth 4 (four) times a day as needed for diarrhea, Starting Sun 6/9/2024, Normal      Natural Senna Laxative 8.6 MG tablet TAKE ONE TABLET BY MOUTH TWICE A DAY AS NEEDED FOR CONSTIPATION, Normal      omeprazole (PriLOSEC) 20 mg delayed release capsule Take 1 capsule (20 mg total) by mouth 2 (two) times a day for 14 days, Starting Mon 7/15/2024, Until Mon 7/29/2024, Normal      sucralfate (CARAFATE) 1 g tablet TAKE ONE TABLET BY MOUTH FOUR TIMES A DAY BEFORE MEALS AS NEEDED, Normal      triamcinolone (KENALOG) 0.1 % ointment APPLY TOPICALLY TO AFFECTED AREA TWICE A DAY, Normal       !! - Potential duplicate medications found. Please discuss with provider.        No discharge procedures on file.  ED SEPSIS DOCUMENTATION   Time reflects when diagnosis was documented in both MDM as applicable and the Disposition within this note       Time User Action Codes Description Comment    6/20/2025 11:34 PM Brianda Torres Add [R10.32] Left lower quadrant abdominal pain     6/20/2025 11:34 PM Brianda Torres Add [N39.0] UTI (urinary tract infection)                      [1]   Past Medical History:  Diagnosis Date    Asthma     Endometriosis     GERD (gastroesophageal reflux disease)    [2]   Past Surgical History:  Procedure Laterality Date    LAPAROSCOPY      for endometriosis    SC EXC B9 LESION MRGN XCP SK TG F/E/E/N/L/M > 4.0CM N/A 2/25/2022    Procedure: EXCISION OF FOREHEAD SUBCUTANEOUS LESION WITH COMPLEX CLOSURE;  Surgeon: Elkin Cade MD;  Location: [facility];  Service: Plastics   [3]   Family History  Problem Relation Name Age of Onset    Anxiety  disorder Mother      Depression Mother      Asthma Mother      Asthma Brother      Stomach cancer Maternal Grandmother     [4]   Social History  Tobacco Use    Smoking status: Former     Types: Cigarettes     Passive exposure: Current    Smokeless tobacco: Never    Tobacco comments:     hookah socially    Vaping Use    Vaping status: Some Days    Substances: Flavoring   Substance Use Topics    Alcohol use: Not Currently    Drug use: Never

## 2025-06-22 LAB — BACTERIA UR CULT: NORMAL

## 2025-06-23 LAB — BACTERIA UR CULT: NORMAL

## 2025-07-05 ENCOUNTER — APPOINTMENT (EMERGENCY)
Dept: RADIOLOGY | Facility: HOSPITAL | Age: 25
End: 2025-07-05

## 2025-07-05 ENCOUNTER — HOSPITAL ENCOUNTER (EMERGENCY)
Facility: HOSPITAL | Age: 25
Discharge: HOME | End: 2025-07-05
Attending: EMERGENCY MEDICINE

## 2025-07-05 VITALS
HEART RATE: 91 BPM | OXYGEN SATURATION: 99 % | BODY MASS INDEX: 23.99 KG/M2 | WEIGHT: 131.17 LBS | SYSTOLIC BLOOD PRESSURE: 119 MMHG | TEMPERATURE: 98.1 F | DIASTOLIC BLOOD PRESSURE: 71 MMHG

## 2025-07-05 DIAGNOSIS — J40 BRONCHITIS: Primary | ICD-10-CM

## 2025-07-05 LAB
ALBUMIN SERPL BCG-MCNC: 4.3 G/DL (ref 3.5–5)
ALP SERPL-CCNC: 61 U/L (ref 34–104)
ALT SERPL W P-5'-P-CCNC: 9 U/L (ref 7–52)
ANION GAP SERPL CALCULATED.3IONS-SCNC: 8 MMOL/L (ref 4–13)
AST SERPL W P-5'-P-CCNC: 15 U/L (ref 13–39)
BASOPHILS # BLD AUTO: 0.06 THOUSANDS/ÂΜL (ref 0–0.1)
BASOPHILS NFR BLD AUTO: 1 % (ref 0–1)
BILIRUB SERPL-MCNC: 1.96 MG/DL (ref 0.2–1)
BUN SERPL-MCNC: 10 MG/DL (ref 5–25)
CALCIUM SERPL-MCNC: 9.1 MG/DL (ref 8.4–10.2)
CARDIAC TROPONIN I PNL SERPL HS: <2 NG/L (ref ?–50)
CHLORIDE SERPL-SCNC: 106 MMOL/L (ref 96–108)
CO2 SERPL-SCNC: 25 MMOL/L (ref 21–32)
CREAT SERPL-MCNC: 0.53 MG/DL (ref 0.6–1.3)
EOSINOPHIL # BLD AUTO: 0.31 THOUSAND/ÂΜL (ref 0–0.61)
EOSINOPHIL NFR BLD AUTO: 3 % (ref 0–6)
ERYTHROCYTE [DISTWIDTH] IN BLOOD BY AUTOMATED COUNT: 12 % (ref 11.6–15.1)
GFR SERPL CREATININE-BSD FRML MDRD: 133 ML/MIN/1.73SQ M
GLUCOSE SERPL-MCNC: 76 MG/DL (ref 65–140)
HCT VFR BLD AUTO: 40.5 % (ref 34.8–46.1)
HGB BLD-MCNC: 13.6 G/DL (ref 11.5–15.4)
IMM GRANULOCYTES # BLD AUTO: 0.05 THOUSAND/UL (ref 0–0.2)
IMM GRANULOCYTES NFR BLD AUTO: 0 % (ref 0–2)
LYMPHOCYTES # BLD AUTO: 1.12 THOUSANDS/ÂΜL (ref 0.6–4.47)
LYMPHOCYTES NFR BLD AUTO: 9 % (ref 14–44)
MAGNESIUM SERPL-MCNC: 1.9 MG/DL (ref 1.9–2.7)
MCH RBC QN AUTO: 30.6 PG (ref 26.8–34.3)
MCHC RBC AUTO-ENTMCNC: 33.6 G/DL (ref 31.4–37.4)
MCV RBC AUTO: 91 FL (ref 82–98)
MONOCYTES # BLD AUTO: 0.87 THOUSAND/ÂΜL (ref 0.17–1.22)
MONOCYTES NFR BLD AUTO: 7 % (ref 4–12)
NEUTROPHILS # BLD AUTO: 9.66 THOUSANDS/ÂΜL (ref 1.85–7.62)
NEUTS SEG NFR BLD AUTO: 80 % (ref 43–75)
NRBC BLD AUTO-RTO: 0 /100 WBCS
PLATELET # BLD AUTO: 192 THOUSANDS/UL (ref 149–390)
PMV BLD AUTO: 11 FL (ref 8.9–12.7)
POTASSIUM SERPL-SCNC: 3.4 MMOL/L (ref 3.5–5.3)
PROT SERPL-MCNC: 6.8 G/DL (ref 6.4–8.4)
RBC # BLD AUTO: 4.45 MILLION/UL (ref 3.81–5.12)
SODIUM SERPL-SCNC: 139 MMOL/L (ref 135–147)
WBC # BLD AUTO: 12.07 THOUSAND/UL (ref 4.31–10.16)

## 2025-07-05 PROCEDURE — 99285 EMERGENCY DEPT VISIT HI MDM: CPT

## 2025-07-05 PROCEDURE — 99284 EMERGENCY DEPT VISIT MOD MDM: CPT

## 2025-07-05 PROCEDURE — 93005 ELECTROCARDIOGRAM TRACING: CPT

## 2025-07-05 PROCEDURE — 84484 ASSAY OF TROPONIN QUANT: CPT

## 2025-07-05 PROCEDURE — 80053 COMPREHEN METABOLIC PANEL: CPT

## 2025-07-05 PROCEDURE — 36415 COLL VENOUS BLD VENIPUNCTURE: CPT

## 2025-07-05 PROCEDURE — 83735 ASSAY OF MAGNESIUM: CPT

## 2025-07-05 PROCEDURE — 71045 X-RAY EXAM CHEST 1 VIEW: CPT

## 2025-07-05 PROCEDURE — 85025 COMPLETE CBC W/AUTO DIFF WBC: CPT

## 2025-07-05 PROCEDURE — 94640 AIRWAY INHALATION TREATMENT: CPT

## 2025-07-05 RX ORDER — GUAIFENESIN 600 MG/1
1200 TABLET, EXTENDED RELEASE ORAL 2 TIMES DAILY
Qty: 28 TABLET | Refills: 0 | Status: SHIPPED | OUTPATIENT
Start: 2025-07-05 | End: 2025-07-05

## 2025-07-05 RX ORDER — IPRATROPIUM BROMIDE AND ALBUTEROL SULFATE 2.5; .5 MG/3ML; MG/3ML
3 SOLUTION RESPIRATORY (INHALATION)
Status: DISCONTINUED | OUTPATIENT
Start: 2025-07-05 | End: 2025-07-05 | Stop reason: HOSPADM

## 2025-07-05 RX ORDER — GUAIFENESIN 600 MG/1
600 TABLET, EXTENDED RELEASE ORAL ONCE
Status: COMPLETED | OUTPATIENT
Start: 2025-07-05 | End: 2025-07-05

## 2025-07-05 RX ORDER — ACETAMINOPHEN 325 MG/1
975 TABLET ORAL ONCE
Status: COMPLETED | OUTPATIENT
Start: 2025-07-05 | End: 2025-07-05

## 2025-07-05 RX ORDER — GUAIFENESIN 600 MG/1
1200 TABLET, EXTENDED RELEASE ORAL 2 TIMES DAILY
Qty: 28 TABLET | Refills: 0 | Status: SHIPPED | OUTPATIENT
Start: 2025-07-05

## 2025-07-05 RX ADMIN — IPRATROPIUM BROMIDE AND ALBUTEROL SULFATE 3 ML: 2.5; .5 SOLUTION RESPIRATORY (INHALATION) at 21:10

## 2025-07-05 RX ADMIN — GUAIFENESIN 600 MG: 600 TABLET ORAL at 21:04

## 2025-07-05 RX ADMIN — ACETAMINOPHEN 975 MG: 325 TABLET ORAL at 21:04

## 2025-07-05 NOTE — Clinical Note
Kylah Goff was seen and treated in our emergency department on 7/5/2025.    No restrictions            Diagnosis:     Kylah  .    She may return on this date: 07/07/2025         If you have any questions or concerns, please don't hesitate to call.      Vishnu Fernandez PA-C    ______________________________           _______________          _______________  Hospital Representative                              Date                                Time

## 2025-07-05 NOTE — Clinical Note
Kylah Jaycees was seen and treated in our emergency department on 7/5/2025.    No restrictions            Diagnosis:     Kylah  .    She may return on this date:          If you have any questions or concerns, please don't hesitate to call.      Luann Booth RN    ______________________________           _______________          _______________  Hospital Representative                              Date                                Time

## 2025-07-06 LAB
ATRIAL RATE: 72 BPM
P AXIS: 53 DEGREES
PR INTERVAL: 130 MS
QRS AXIS: 66 DEGREES
QRSD INTERVAL: 84 MS
QT INTERVAL: 380 MS
QTC INTERVAL: 416 MS
T WAVE AXIS: 42 DEGREES
VENTRICULAR RATE: 72 BPM

## 2025-07-06 PROCEDURE — 93010 ELECTROCARDIOGRAM REPORT: CPT | Performed by: INTERNAL MEDICINE

## 2025-07-06 NOTE — ED PROVIDER NOTES
Subjective:       Patient ID: Alfred Cuello is a 62 y.o. male.    Chief Complaint: Follow-up (6mth f/u)    Patient Active Problem List   Diagnosis    Iron deficiency anemia    Hypertension associated with diabetes    Gastroesophageal reflux disease    H/O pyloric stenosis    Dyslipidemia    Chronic kidney disease, stage 3a    RAFAEL on CPAP    Painful diabetic neuropathy    Chronic pain syndrome    Diabetic polyneuropathy associated with type 2 diabetes mellitus    Research study patient    Type 2 diabetes mellitus with hyperglycemia, without long-term current use of insulin    Vitamin D deficiency    Severe obesity (BMI 35.0-39.9) with comorbidity    Persistent atrial fibrillation    Chronic diastolic congestive heart failure    H/O cardiac radiofrequency ablation    Major depressive disorder, single episode, mild     Patient is here for a chronic conditions follow up.    Reviewed labs 10/2024  History of Present Illness    CHIEF COMPLAINT:  Mr. Cuello presents today for follow-up after the recent loss of his wife.    GRIEF AND DEPRESSION:  He reports feeling very down and experiencing significant grief. He describes the grief as intense and overwhelming, affecting every aspect of his life. He frequently cries, including every morning, nightly, and at work. He expresses feeling shame and states he had no prior understanding of the intensity of grief. He is attending a grief class at his [employer] but feels it is not particularly helpful. He denies current use of mood-altering medications.    SLEEP:  He reports significant sleep disturbances, typically getting only 2-2.5 hours of sleep per night despite going to bed early (between 8:00 PM and 10:00 PM). He struggles to fall back asleep after waking, often lying in bed for an hour before getting up for another two hours. He has tried OTC sleep aids, including melatonin and nighttime pain medications, but found them ineffective. He expresses concern about taking  Time reflects when diagnosis was documented in both MDM as applicable and the Disposition within this note       Time User Action Codes Description Comment    7/5/2025  9:43 PM Vishnu Fernandez Add [J40] Bronchitis           ED Disposition       ED Disposition   Discharge    Condition   Stable    Date/Time   Sat Jul 5, 2025  9:43 PM    Comment   Kylahher Toscanomaikel Goff discharge to home/self care.                   Assessment & Plan       Medical Decision Making  Patient is a 25-year-old female coming in for evaluation of chest pain, as well as coughing.  Has been ongoing for some time.  Patient did have decreased breath sounds on auscultation, secondary to not moving air well.  No wheezing though.  Patient's vitals were overall within normal limits, however given breathing treatment to help patient's chest tightness.  Patient's chest pain is mainly when coughing, troponin was negative, heart score less 0.  No concern for ACS at this time.  Chest x-ray as read by myself shows no sign of pulmonary consolidation, or pulmonary congestion.  No concern for pneumonia at this time.  Patient does have slight leukocytosis.  Patient symptoms, as well as relatively benign workup and exam, suspect that patient has bronchitis.  Patient was given supportive conditions and discharged home    Amount and/or Complexity of Data Reviewed  Labs: ordered. Decision-making details documented in ED Course.  Radiology: ordered and independent interpretation performed.    Risk  OTC drugs.  Prescription drug management.        ED Course as of 07/05/25 2205   Sat Jul 05, 2025 2104 WBC(!): 12.07   2126 hs TnI 0hr: <2       Medications   ipratropium-albuterol (DUO-NEB) 0.5-2.5 mg/3 mL inhalation solution 3 mL (3 mL Nebulization Given 7/5/25 2110)   guaiFENesin (MUCINEX) 12 hr tablet 600 mg (600 mg Oral Given 7/5/25 2104)   acetaminophen (TYLENOL) tablet 975 mg (975 mg Oral Given 7/5/25 2104)       ED Risk Strat Scores      HEART Risk Score       Flowsheet Row Most Recent Value   Heart Score Risk Calculator    History 0 Filed at: 07/05/2025 2204   ECG 0 Filed at: 07/05/2025 2204   Age 0 Filed at: 07/05/2025 2204   Risk Factors 0 Filed at: 07/05/2025 2204   Troponin 0 Filed at: 07/05/2025 2204   HEART Score 0 Filed at: 07/05/2025 2204                      No data recorded        SBIRT 22yo+      Flowsheet Row Most Recent Value   Initial Alcohol Screen: US AUDIT-C     1. How often do you have a drink containing alcohol? 0 Filed at: 07/05/2025 2101   2. How many drinks containing alcohol do you have on a typical day you are drinking?  0 Filed at: 07/05/2025 2101   3a. Male UNDER 65: How often do you have five or more drinks on one occasion? 0 Filed at: 07/05/2025 2101   3b. FEMALE Any Age, or MALE 65+: How often do you have 4 or more drinks on one occassion? 0 Filed at: 07/05/2025 2101   Audit-C Score 0 Filed at: 07/05/2025 2101   KARYNA: How many times in the past year have you...    Used an illegal drug or used a prescription medication for non-medical reasons? Never Filed at: 07/05/2025 2101                            History of Present Illness       Chief Complaint   Patient presents with    Chest Pain     Started last night. Comes and goes. Worse when coughing. Ibuprofen at 1430 today without relief.     Cough     Started yesterday.        Past Medical History[1]   Past Surgical History[2]   Family History[3]   Social History[4]   E-Cigarette/Vaping    E-Cigarette Use Current Some Day User     Comments vaping       E-Cigarette/Vaping Substances    Nicotine No     Flavoring Yes       I have reviewed and agree with the history as documented.     Patient is a 25-year-old female coming in for evaluation of chest pain, specifically when coughing.  Has been ongoing for the last several days.  No other sick family members at home.  No fevers.  No chest pain at rest.  Patient denies shortness of breath.  Patient does have some nasal congestion, when talking.   medications that may cause daytime grogginess.    CARDIOVASCULAR:  He has been diagnosed with atrial fibrillation, having undergone cardioversion twice and a cardiac procedure. Despite these interventions, he reports a recurrence of atrial fibrillation. He was recently prescribed a new antihypertensive medication. He expresses frustration with the ongoing cardiac issues and the need for additional treatments, denying any improvement in his cardiac condition following previous interventions.    ANXIETY SYMPTOMS:  He experiences chest pain and difficulty breathing multiple times daily.    SOCIAL SUPPORT:  He reports limited family support but has some friends who check on him. He expresses gratitude for his workplace, describing his employers as very supportive and understanding.    FINANCIAL SITUATION:  He discloses financial difficulties, mentioning that he gained $150,000 in debt and lost $1,000 in income following his wife's passing. He applied for Social Security survivor benefits but was denied due to his income exceeding the threshold.    DIET:  He reports consuming a poor diet over the past 8 months, consisting primarily of fast food due to being constantly on the go.      ROS:  ROS findings as noted in HPI. History of Present Illness    CHIEF COMPLAINT:  Mr. Cuello presents today for follow-up after the recent loss of his wife.    GRIEF AND DEPRESSION:  He reports feeling very down and experiencing significant grief. He describes the grief as intense and overwhelming, affecting every aspect of his life. He frequently cries, including every morning, nightly, and at work. He expresses feeling shame and states he had no prior understanding of the intensity of grief. He is attending a grief class at his [employer] but feels it is not particularly helpful. He denies current use of mood-altering medications.    SLEEP:  He reports significant sleep disturbances, typically getting only 2-2.5 hours of sleep per night  She maintaining her airway, handling own secretions.  Does have a history of asthma      Chest Pain  Associated symptoms: cough and fatigue    Associated symptoms: no abdominal pain, no dysphagia, no fever, no nausea, no shortness of breath and not vomiting    Cough  Associated symptoms: chest pain    Associated symptoms: no chills, no ear pain, no fever, no shortness of breath and no wheezing        Review of Systems   Constitutional:  Positive for fatigue. Negative for chills and fever.   HENT:  Positive for congestion. Negative for ear pain and trouble swallowing.    Respiratory:  Positive for cough. Negative for shortness of breath and wheezing.    Cardiovascular:  Positive for chest pain.   Gastrointestinal:  Negative for abdominal pain, nausea and vomiting.   Genitourinary:  Negative for dysuria.           Objective       ED Triage Vitals   Temperature Pulse Blood Pressure Resp SpO2 Patient Position - Orthostatic VS   07/05/25 2044 07/05/25 2044 07/05/25 2044 -- 07/05/25 2044 07/05/25 2044   98.1 °F (36.7 °C) 91 119/71  99 % Sitting      Temp Source Heart Rate Source BP Location FiO2 (%) Pain Score    07/05/25 2044 07/05/25 2044 07/05/25 2044 -- 07/05/25 2104    Oral Monitor Right arm  5      Vitals      Date and Time Temp Pulse SpO2 Resp BP Pain Score FACES Pain Rating User   07/05/25 2131 -- -- -- -- -- 4 -- KA   07/05/25 2104 -- -- -- -- -- 5 --    07/05/25 2044 98.1 °F (36.7 °C) 91 99 % -- 119/71 -- --             Physical Exam  Vitals reviewed.   Constitutional:       Appearance: Normal appearance. She is normal weight.   HENT:      Head: Normocephalic and atraumatic.      Right Ear: External ear normal.      Left Ear: External ear normal.      Nose: Nose normal.     Eyes:      Conjunctiva/sclera: Conjunctivae normal.       Cardiovascular:      Rate and Rhythm: Normal rate.   Pulmonary:      Effort: Pulmonary effort is normal. No tachypnea, accessory muscle usage or respiratory distress.      Breath  despite going to bed early (between 8:00 PM and 10:00 PM). He struggles to fall back asleep after waking, often lying in bed for an hour before getting up for another two hours. He has tried OTC sleep aids, including melatonin and nighttime pain medications, but found them ineffective. He expresses concern about taking medications that may cause daytime grogginess.    CARDIOVASCULAR:  He has been diagnosed with atrial fibrillation, having undergone cardioversion twice and a cardiac procedure. Despite these interventions, he reports a recurrence of atrial fibrillation. He was recently prescribed a new antihypertensive medication. He expresses frustration with the ongoing cardiac issues and the need for additional treatments, denying any improvement in his cardiac condition following previous interventions.    ANXIETY SYMPTOMS:  He experiences chest pain and difficulty breathing multiple times daily.    SOCIAL SUPPORT:  He reports limited family support but has some friends who check on him. He expresses gratitude for his workplace, describing his employers as very supportive and understanding.    FINANCIAL SITUATION:  He discloses financial difficulties, mentioning that he gained $150,000 in debt and lost $1,000 in income following his wife's passing. He applied for Social Security survivor benefits but was denied due to his income exceeding the threshold.    DIET:  He reports consuming a poor diet over the past 8 months, consisting primarily of fast food due to being constantly on the go.      ROS:  ROS findings as noted in HPI.            Review of Systems   Constitutional:  Negative for fatigue and unexpected weight change.   Respiratory:  Negative for chest tightness and shortness of breath.    Cardiovascular:  Negative for chest pain, palpitations and leg swelling.   Gastrointestinal:  Negative for abdominal pain.   Musculoskeletal:  Negative for arthralgias.   Neurological:  Negative for dizziness, syncope,  light-headedness and headaches.   Psychiatric/Behavioral:  Positive for dysphoric mood and sleep disturbance. The patient is nervous/anxious.       Relevant History:  Psych Wife passed away 7 weeks ago. Intense grief.  Not a lot of support at home. Boss has been supportive. Trouble sleeping , mainly interrupted sleep     GI Dr. Koo- colonoscopy 2024-10 polyps. On 3 year surveillance     Card JEAN-PAUL Bartholomew PAF, s/p ablation, chronic diastolic heart failure on eliquis.  Did stress echo 9/2023The study is consistent with ischemia probably in the distribution of the RCA with significant symptoms not at an optimal level of stress.     EP Dr. Lee s/p successful RF cath ablation 12/6/22  Spironolactone started for chronic diastolic HF 10/15/2024, started on lasix 20mg bid 8/2024     Endocrine Dr. Beach/Dr. Muniz- type 2 DM A1c 7.6 on glimepiride, metformin, actos. ckd stage 3. On ACE I, lipitor and zetia. Mild anemia. Urine ma nl  has advancing neuropathy pain uncontrolled with both gabapentin 300mg 3 po qid and lyrica 50mg tid.  Did well with metanx but too expensive     Podiatry Dr. Glez diab polyneuropathy      Eye Dr. Castaneda cataract    Pulm RAFAEL on cpap  Objective:      Physical Exam  Vitals and nursing note reviewed.   Constitutional:       Appearance: He is well-developed.   Cardiovascular:      Rate and Rhythm: Normal rate and regular rhythm.      Heart sounds: Normal heart sounds.   Pulmonary:      Effort: Pulmonary effort is normal.      Breath sounds: Normal breath sounds.   Skin:     General: Skin is warm and dry.   Neurological:      Mental Status: He is alert and oriented to person, place, and time.   Psychiatric:         Mood and Affect: Mood is depressed. Affect is tearful.         Speech: Speech normal.         Behavior: Behavior normal.         Assessment:       ICD-10-CM ICD-9-CM    1. Hypertension associated with diabetes  E11.59 250.80     I15.2 401.9       2. Grief reaction  F43.21 309.0  sounds: Decreased breath sounds present. No wheezing.   Abdominal:      Palpations: Abdomen is soft.      Tenderness: There is no abdominal tenderness. There is no guarding.     Musculoskeletal:         General: Normal range of motion.      Cervical back: Normal range of motion.     Skin:     General: Skin is warm and dry.     Neurological:      Mental Status: She is alert.         Results Reviewed       Procedure Component Value Units Date/Time    HS Troponin 0hr (reflex protocol) [[id number]]  (Normal) Collected: 07/05/25 2058    Lab Status: Final result Specimen: Blood from Arm, Right Updated: 07/05/25 2125     hs TnI 0hr <2 ng/L     Comprehensive metabolic panel [[id number]]  (Abnormal) Collected: 07/05/25 2058    Lab Status: Final result Specimen: Blood from Arm, Right Updated: 07/05/25 2120     Sodium 139 mmol/L      Potassium 3.4 mmol/L      Chloride 106 mmol/L      CO2 25 mmol/L      ANION GAP 8 mmol/L      BUN 10 mg/dL      Creatinine 0.53 mg/dL      Glucose 76 mg/dL      Calcium 9.1 mg/dL      AST 15 U/L      ALT 9 U/L      Alkaline Phosphatase 61 U/L      Total Protein 6.8 g/dL      Albumin 4.3 g/dL      Total Bilirubin 1.96 mg/dL      eGFR 133 ml/min/1.73sq m     Narrative:      National Kidney Disease Foundation guidelines for Chronic Kidney Disease (CKD):     Stage 1 with normal or high GFR (GFR > 90 mL/min/1.73 square meters)    Stage 2 Mild CKD (GFR = 60-89 mL/min/1.73 square meters)    Stage 3A Moderate CKD (GFR = 45-59 mL/min/1.73 square meters)    Stage 3B Moderate CKD (GFR = 30-44 mL/min/1.73 square meters)    Stage 4 Severe CKD (GFR = 15-29 mL/min/1.73 square meters)    Stage 5 End Stage CKD (GFR <15 mL/min/1.73 square meters)  Note: GFR calculation is accurate only with a steady state creatinine    Magnesium [[id number]]  (Normal) Collected: 07/05/25 2058    Lab Status: Final result Specimen: Blood from Arm, Right Updated: 07/05/25 2120     Magnesium 1.9 mg/dL     CBC and differential [[id number]]   (Abnormal) Collected: 07/05/25 2058    Lab Status: Final result Specimen: Blood from Arm, Right Updated: 07/05/25 2103     WBC 12.07 Thousand/uL      RBC 4.45 Million/uL      Hemoglobin 13.6 g/dL      Hematocrit 40.5 %      MCV 91 fL      MCH 30.6 pg      MCHC 33.6 g/dL      RDW 12.0 %      MPV 11.0 fL      Platelets 192 Thousands/uL      nRBC 0 /100 WBCs      Segmented % 80 %      Immature Grans % 0 %      Lymphocytes % 9 %      Monocytes % 7 %      Eosinophils Relative 3 %      Basophils Relative 1 %      Absolute Neutrophils 9.66 Thousands/µL      Absolute Immature Grans 0.05 Thousand/uL      Absolute Lymphocytes 1.12 Thousands/µL      Absolute Monocytes 0.87 Thousand/µL      Eosinophils Absolute 0.31 Thousand/µL      Basophils Absolute 0.06 Thousands/µL             XR chest 1 view portable   ED Interpretation by Vishnu Fernandez PA-C (07/05 2143)   No acute cardiopulmonary disease            Procedures    ED Medication and Procedure Management   Prior to Admission Medications   Prescriptions Last Dose Informant Patient Reported? Taking?   Natural Senna Laxative 8.6 MG tablet   No No   Sig: TAKE ONE TABLET BY MOUTH TWICE A DAY AS NEEDED FOR CONSTIPATION   albuterol (2.5 mg/3 mL) 0.083 % nebulizer solution   No No   Sig: Take 3 mL (2.5 mg total) by nebulization every 6 (six) hours as needed for wheezing or shortness of breath   albuterol (PROVENTIL HFA,VENTOLIN HFA) 90 mcg/act inhaler   No No   Sig: INHALE 2 PUFFS BY MOUTH EVERY SIX HOURS AS NEEDED FOR WHEEZING   erythromycin (ILOTYCIN) ophthalmic ointment   No No   Sig: Place a 1/2 inch ribbon of ointment into the lower eyelid four times a day for 7 days.   famotidine (PEPCID) 40 MG tablet   No No   Sig: Take 1 tablet (40 mg total) by mouth 2 (two) times a day   hydrocortisone 2.5 % cream   No No   Sig: Apply topically 2 (two) times a day   ibuprofen (MOTRIN) 600 mg tablet   No No   Sig: Take 1 tablet (600 mg total) by mouth every 6 (six) hours as needed for  EScitalopram oxalate (LEXAPRO) 10 MG tablet      clonazePAM (KLONOPIN) 0.5 MG tablet      Ambulatory referral/consult to Primary Care Behavioral Health (Non-Opioids)      3. Type 2 diabetes mellitus with hyperglycemia, without long-term current use of insulin  E11.65 250.00 CBC Auto Differential     790.29 Comprehensive Metabolic Panel      Hemoglobin A1C      4. Dyslipidemia  E78.5 272.4 Lipid Panel      5. Severe obesity (BMI 35.0-39.9) with comorbidity  E66.01 278.01       6. Persistent atrial fibrillation  I48.19 427.31       7. RAFAEL on CPAP  G47.33 327.23       8. Iron deficiency anemia, unspecified iron deficiency anemia type  D50.9 280.9       9. Vitamin D deficiency  E55.9 268.9       10. Gastroesophageal reflux disease without esophagitis  K21.9 530.81       11. Major depressive disorder, single episode, mild  F32.0 296.21          Plan:   1. Hypertension associated with diabetes (Primary)  Controlled on current medications.  Continue current medications.      2. Grief reaction  Add  - EScitalopram oxalate (LEXAPRO) 10 MG tablet; Take 1 tablet (10 mg total) by mouth every evening.  Dispense: 90 tablet; Refill: 1  Use prn  - clonazePAM (KLONOPIN) 0.5 MG tablet; Take 1 tablet (0.5 mg total) by mouth 2 (two) times daily as needed for Anxiety (insomnia).  Dispense: 60 tablet; Refill: 0  Refer for eval and treat  - Ambulatory referral/consult to Primary Care Behavioral Health (Non-Opioids); Future    3. Type 2 diabetes mellitus with hyperglycemia, without long-term current use of insulin  Not at goal due to recent non compliance with diet/stress and grief.  Stable condition.  Continue current medications.  Will adjust based on lab findings or if condition changes.  Work on better diet  - CBC Auto Differential; Future  - Comprehensive Metabolic Panel; Future  - Hemoglobin A1C; Future    4. Dyslipidemia  Controlled on current medications.  Continue current medications.    - Lipid Panel; Future    5. Severe obesity  "(BMI 35.0-39.9) with comorbidity  Counseled patient on his ideal body weight, health consequences of being obese and current recommendations including weekly exercise and a heart healthy diet.  Current BMI is:Estimated body mass index is 36.23 kg/m² as calculated from the following:    Height as of this encounter: 6' 2" (1.88 m).    Weight as of this encounter: 128 kg (282 lb 3 oz)..  Patient is aware that ideal BMI < 25 or Weight in (lb) to have BMI = 25: 194.3.      6. Persistent atrial fibrillation  Cont eliquis and card care     7. RAFAEL on CPAP  Cont cpap consistently    8. Iron deficiency anemia, unspecified iron deficiency anemia type  Normal. Cont monitoring    9. Vitamin D deficiency  .normal. Cont monitoring    10. Gastroesophageal reflux disease without esophagitis  Controlled on current medications.  Continue current medications.      11. Major depressive disorder, single episode, mild  See above    Assessment & Plan    - Explained that grief is a normal process that manifests differently for everyone and will not remain as intense forever.  - Discussed how antidepressants can help manage overwhelming emotions without eliminating grief entirely.  - Clarified that melatonin is more effective for regulating sleep onset rather than addressing sleep interruption.  - Mr. Cuello to consider attending grief support groups or grief share sessions for additional support.  - Started Lexapro (escitalopram) at bedtime for mild antidepressant effect.  - Started clonazepam as needed for sleep interruption or intense anxiety; do not drive after taking.  - Continued current blood pressure medication regimen without changes.  - Referred to Dr. Green, primary care behavioral health psychologist, for counseling support.  - Follow up in 1 month for virtual visit to assess response to new medications and overall well-being.  - Follow up in 6 months for in-person visit with fasting labs for usual checkup.  - Contact the office " mild pain   ibuprofen (MOTRIN) 800 mg tablet   No No   Sig: Take 1 tablet (800 mg total) by mouth 3 (three) times a day   ibuprofen (MOTRIN) 800 mg tablet   No No   Sig: Take 1 tablet (800 mg total) by mouth 3 (three) times a day   loperamide (IMODIUM) 2 mg capsule   No No   Sig: Take 1 capsule (2 mg total) by mouth 4 (four) times a day as needed for diarrhea   naproxen (Naprosyn) 500 mg tablet   No No   Sig: Take 1 tablet (500 mg total) by mouth 2 (two) times a day with meals   omeprazole (PriLOSEC) 20 mg delayed release capsule   No No   Sig: Take 1 capsule (20 mg total) by mouth 2 (two) times a day for 14 days   sucralfate (CARAFATE) 1 g tablet   No No   Sig: TAKE ONE TABLET BY MOUTH FOUR TIMES A DAY BEFORE MEALS AS NEEDED   triamcinolone (KENALOG) 0.1 % ointment   No No   Sig: APPLY TOPICALLY TO AFFECTED AREA TWICE A DAY      Facility-Administered Medications: None     Discharge Medication List as of 7/5/2025  9:44 PM        START taking these medications    Details   guaiFENesin (MUCINEX) 600 mg 12 hr tablet Take 2 tablets (1,200 mg total) by mouth 2 (two) times a day, Starting Sat 7/5/2025, Print           CONTINUE these medications which have NOT CHANGED    Details   albuterol (2.5 mg/3 mL) 0.083 % nebulizer solution Take 3 mL (2.5 mg total) by nebulization every 6 (six) hours as needed for wheezing or shortness of breath, Starting Wed 4/10/2024, Normal      albuterol (PROVENTIL HFA,VENTOLIN HFA) 90 mcg/act inhaler INHALE 2 PUFFS BY MOUTH EVERY SIX HOURS AS NEEDED FOR WHEEZING, Normal      erythromycin (ILOTYCIN) ophthalmic ointment Place a 1/2 inch ribbon of ointment into the lower eyelid four times a day for 7 days., Normal      famotidine (PEPCID) 40 MG tablet Take 1 tablet (40 mg total) by mouth 2 (two) times a day, Starting Wed 8/7/2024, Normal      hydrocortisone 2.5 % cream Apply topically 2 (two) times a day, Starting Wed 4/10/2024, Normal      !! ibuprofen (MOTRIN) 600 mg tablet Take 1 tablet (600 mg  if experiencing any side effects from new medications, particularly if daytime sleepiness persists beyond a few days.       Time spent with patient: 20 minutes  Patient with be reevaluated in 6 months or sooner prn  Greater than 50% of this visit was spent counseling as described in above documentation:Yes  This note was generated with the assistance of ambient listening technology. Verbal consent was obtained by the patient and accompanying visitor(s) for the recording of patient appointment to facilitate this note. I attest to having reviewed and edited the generated note for accuracy, though some syntax or spelling errors may persist. Please contact the author of this note for any clarification.      total) by mouth every 6 (six) hours as needed for mild pain, Starting Mon 3/24/2025, Normal      !! ibuprofen (MOTRIN) 800 mg tablet Take 1 tablet (800 mg total) by mouth 3 (three) times a day, Starting Sun 5/19/2024, Normal      !! ibuprofen (MOTRIN) 800 mg tablet Take 1 tablet (800 mg total) by mouth 3 (three) times a day, Starting Wed 4/2/2025, Normal      loperamide (IMODIUM) 2 mg capsule Take 1 capsule (2 mg total) by mouth 4 (four) times a day as needed for diarrhea, Starting Sun 6/9/2024, Normal      naproxen (Naprosyn) 500 mg tablet Take 1 tablet (500 mg total) by mouth 2 (two) times a day with meals, Starting Fri 6/20/2025, Normal      Natural Senna Laxative 8.6 MG tablet TAKE ONE TABLET BY MOUTH TWICE A DAY AS NEEDED FOR CONSTIPATION, Normal      omeprazole (PriLOSEC) 20 mg delayed release capsule Take 1 capsule (20 mg total) by mouth 2 (two) times a day for 14 days, Starting Mon 7/15/2024, Until Mon 7/29/2024, Normal      sucralfate (CARAFATE) 1 g tablet TAKE ONE TABLET BY MOUTH FOUR TIMES A DAY BEFORE MEALS AS NEEDED, Normal      triamcinolone (KENALOG) 0.1 % ointment APPLY TOPICALLY TO AFFECTED AREA TWICE A DAY, Normal       !! - Potential duplicate medications found. Please discuss with provider.        No discharge procedures on file.  ED SEPSIS DOCUMENTATION   Time reflects when diagnosis was documented in both MDM as applicable and the Disposition within this note       Time User Action Codes Description Comment    7/5/2025  9:43 PM Vishnu Fernandez Add [J40] Bronchitis                    [1]   Past Medical History:  Diagnosis Date    Asthma     Endometriosis     GERD (gastroesophageal reflux disease)    [2]   Past Surgical History:  Procedure Laterality Date    LAPAROSCOPY      for endometriosis    [place] EXC B9 LESION MRGN XCP SK TG F/E/E/N/L/M > 4.0CM N/A 2/25/2022    Procedure: EXCISION OF FOREHEAD SUBCUTANEOUS LESION WITH COMPLEX CLOSURE;  Surgeon: Elkin Cade MD;  Location: [facility];   Service: Plastics   [3]   Family History  Problem Relation Name Age of Onset    Anxiety disorder Mother      Depression Mother      Asthma Mother      Asthma Brother      Stomach cancer Maternal Grandmother     [4]   Social History  Tobacco Use    Smoking status: Former     Types: Cigarettes     Passive exposure: Current    Smokeless tobacco: Never    Tobacco comments:     hookah socially    Vaping Use    Vaping status: Some Days    Substances: Flavoring   Substance Use Topics    Alcohol use: Not Currently    Drug use: Never        Vishnu Fernandez PA-C  07/05/25 [id number]

## (undated) DEVICE — PACK PBDS PLASTIC HEAD AND NECK RF

## (undated) DEVICE — ELECTRODE BLADE MOD E-Z CLEAN  2.75IN 7CM -0012AM

## (undated) DEVICE — GLOVE SRG BIOGEL 6.5

## (undated) DEVICE — ELECTRODE EZ CLEAN BLADE -0012

## (undated) DEVICE — ELECTRODE NEEDLE MEGAFINE 2IN E-Z CLEAN MEGADYNE -0118